# Patient Record
Sex: FEMALE | Race: WHITE | NOT HISPANIC OR LATINO | Employment: FULL TIME | URBAN - METROPOLITAN AREA
[De-identification: names, ages, dates, MRNs, and addresses within clinical notes are randomized per-mention and may not be internally consistent; named-entity substitution may affect disease eponyms.]

---

## 2017-01-03 ENCOUNTER — PATIENT MESSAGE (OUTPATIENT)
Dept: OBSTETRICS AND GYNECOLOGY | Facility: CLINIC | Age: 37
End: 2017-01-03

## 2017-01-09 ENCOUNTER — PATIENT MESSAGE (OUTPATIENT)
Dept: OBSTETRICS AND GYNECOLOGY | Facility: CLINIC | Age: 37
End: 2017-01-09

## 2017-01-12 ENCOUNTER — TELEPHONE (OUTPATIENT)
Dept: OBSTETRICS AND GYNECOLOGY | Facility: CLINIC | Age: 37
End: 2017-01-12

## 2017-01-12 NOTE — TELEPHONE ENCOUNTER
----- Message from Terry Ferro sent at 1/12/2017 12:56 PM CST -----  Contact: pt  x_ 1st Request   _ 2nd Request   _ 3rd Request     Who: GALILEA LAW [[id number]]    Why: pt is requesting a call back in reference to medical records    What Number to Call Back: [phone]    When to Expect a call back: (Before the end of the day)   -- if call after 3:00 call back will be tomorrow.

## 2017-01-18 ENCOUNTER — PATIENT MESSAGE (OUTPATIENT)
Dept: OBSTETRICS AND GYNECOLOGY | Facility: CLINIC | Age: 37
End: 2017-01-18

## 2017-02-02 ENCOUNTER — OFFICE VISIT (OUTPATIENT)
Dept: OPTOMETRY | Facility: CLINIC | Age: 37
End: 2017-02-02
Payer: COMMERCIAL

## 2017-02-02 DIAGNOSIS — H52.13 MYOPIA, BILATERAL: Primary | ICD-10-CM

## 2017-02-02 PROCEDURE — 99999 PR PBB SHADOW E&M-EST. PATIENT-LVL II: CPT | Mod: PBBFAC,,, | Performed by: OPTOMETRIST

## 2017-02-02 PROCEDURE — 92015 DETERMINE REFRACTIVE STATE: CPT | Mod: S$GLB,,, | Performed by: OPTOMETRIST

## 2017-02-02 PROCEDURE — 92014 COMPRE OPH EXAM EST PT 1/>: CPT | Mod: S$GLB,,, | Performed by: OPTOMETRIST

## 2017-02-02 RX ORDER — CABERGOLINE 0.5 MG/1
0.25 TABLET ORAL
COMMUNITY
End: 2017-07-25

## 2017-02-02 RX ORDER — LEVOTHYROXINE SODIUM 75 UG/1
75 TABLET ORAL DAILY
COMMUNITY
End: 2017-09-19 | Stop reason: SDUPTHER

## 2017-02-02 NOTE — PROGRESS NOTES
HPI     DLS: 09/24/2015 Dr. Hall    Patient states she is here for an eye exam. She is seeing well out of her   glasses. She says she needs a new script because lenses are scratched.   Denies flashes, floaters, diplopia, photophobia, glare, HA's, or pain.     No eye meds at this time        Last edited by Yessi Portillo on 2/2/2017 10:28 AM.     Vision Exam    Assessment /Plan     For exam results, see Encounter Report.    Myopia, bilateral  Eyeglass Final Rx     Eyeglass Final Rx      Sphere Cylinder   Right -3.00 Sphere   Left -3.00 Sphere       Type:  SVL    Expiration Date:  2/3/2018                  RTC 1 yr, MEME

## 2017-06-27 ENCOUNTER — LAB VISIT (OUTPATIENT)
Dept: LAB | Facility: HOSPITAL | Age: 37
End: 2017-06-27
Attending: OBSTETRICS & GYNECOLOGY
Payer: COMMERCIAL

## 2017-06-27 ENCOUNTER — OFFICE VISIT (OUTPATIENT)
Dept: OBSTETRICS AND GYNECOLOGY | Facility: CLINIC | Age: 37
End: 2017-06-27
Payer: COMMERCIAL

## 2017-06-27 VITALS
HEIGHT: 62 IN | BODY MASS INDEX: 23.13 KG/M2 | SYSTOLIC BLOOD PRESSURE: 114 MMHG | DIASTOLIC BLOOD PRESSURE: 68 MMHG | WEIGHT: 125.69 LBS

## 2017-06-27 DIAGNOSIS — E55.9 VITAMIN D DEFICIENCY: ICD-10-CM

## 2017-06-27 DIAGNOSIS — Z34.01 PREGNANCY, FIRST, OBSTETRICAL CARE, FIRST TRIMESTER: ICD-10-CM

## 2017-06-27 DIAGNOSIS — Z01.419 VISIT FOR GYNECOLOGIC EXAMINATION: Primary | ICD-10-CM

## 2017-06-27 DIAGNOSIS — E03.9 HYPOTHYROIDISM AFFECTING PREGNANCY: ICD-10-CM

## 2017-06-27 DIAGNOSIS — O99.280 HYPOTHYROIDISM AFFECTING PREGNANCY: ICD-10-CM

## 2017-06-27 LAB
25(OH)D3+25(OH)D2 SERPL-MCNC: 28 NG/ML
ABO + RH BLD: NORMAL
BASOPHILS # BLD AUTO: 0.03 K/UL
BASOPHILS NFR BLD: 0.4 %
BLD GP AB SCN CELLS X3 SERPL QL: NORMAL
DIFFERENTIAL METHOD: ABNORMAL
EOSINOPHIL # BLD AUTO: 0.2 K/UL
EOSINOPHIL NFR BLD: 2.2 %
ERYTHROCYTE [DISTWIDTH] IN BLOOD BY AUTOMATED COUNT: 14.2 %
HCT VFR BLD AUTO: 35.7 %
HGB BLD-MCNC: 12.3 G/DL
LYMPHOCYTES # BLD AUTO: 2.3 K/UL
LYMPHOCYTES NFR BLD: 30.7 %
MCH RBC QN AUTO: 31.5 PG
MCHC RBC AUTO-ENTMCNC: 34.5 %
MCV RBC AUTO: 91 FL
MONOCYTES # BLD AUTO: 0.7 K/UL
MONOCYTES NFR BLD: 8.8 %
NEUTROPHILS # BLD AUTO: 4.4 K/UL
NEUTROPHILS NFR BLD: 57.6 %
PLATELET # BLD AUTO: 275 K/UL
PMV BLD AUTO: 10.2 FL
RBC # BLD AUTO: 3.91 M/UL
TSH SERPL DL<=0.005 MIU/L-ACNC: 1.06 UIU/ML
WBC # BLD AUTO: 7.59 K/UL

## 2017-06-27 PROCEDURE — 82306 VITAMIN D 25 HYDROXY: CPT

## 2017-06-27 PROCEDURE — 86901 BLOOD TYPING SEROLOGIC RH(D): CPT

## 2017-06-27 PROCEDURE — 99999 PR PBB SHADOW E&M-EST. PATIENT-LVL III: CPT | Mod: PBBFAC,,, | Performed by: OBSTETRICS & GYNECOLOGY

## 2017-06-27 PROCEDURE — 84443 ASSAY THYROID STIM HORMONE: CPT

## 2017-06-27 PROCEDURE — 85025 COMPLETE CBC W/AUTO DIFF WBC: CPT

## 2017-06-27 PROCEDURE — 87086 URINE CULTURE/COLONY COUNT: CPT

## 2017-06-27 PROCEDURE — 87340 HEPATITIS B SURFACE AG IA: CPT

## 2017-06-27 PROCEDURE — 86762 RUBELLA ANTIBODY: CPT

## 2017-06-27 PROCEDURE — 86703 HIV-1/HIV-2 1 RESULT ANTBDY: CPT

## 2017-06-27 PROCEDURE — 86592 SYPHILIS TEST NON-TREP QUAL: CPT

## 2017-06-27 PROCEDURE — 99395 PREV VISIT EST AGE 18-39: CPT | Mod: S$GLB,,, | Performed by: OBSTETRICS & GYNECOLOGY

## 2017-06-27 PROCEDURE — 87591 N.GONORRHOEAE DNA AMP PROB: CPT

## 2017-06-27 PROCEDURE — 86900 BLOOD TYPING SEROLOGIC ABO: CPT

## 2017-06-27 NOTE — LETTER
June 27, 2017    Evelia Mishra  [address] [place] [address]         [facility] 5th Floor  [address]  [facility] [address]  Phone: [phone] June 27, 2017     Patient: Evelia Mishra   YOB: 1980   Date of Visit: 6/27/2017       To Whom It May Concern:    It is my medical opinion that Evelia Mishra should remain out of work until throughout the remainder of the pregnancy due to pregnancy related symptoms and prior poor obstetric history.    If you have any questions or concerns, please don't hesitate to call.    Sincerely,        Мария Adams MD

## 2017-06-27 NOTE — PROGRESS NOTES
HISTORY OF PRESENT ILLNESS:    Evelia Mishra is a 36 y.o. female, , Patient's last menstrual period was 04/15/2017.,  presents for a routine exam and early PREGNANCY.  PER LMP AND VERIFIED BY USG WITH DR TREVINO, 10 WEEKS IUP (CONFIRMED FCA WITH USG) AND EDC 2018  HAS A COMPLICATED HISTORY OF TERM , THEN ECTOPIC - MTX AND IVF TWINS WITH T2 DEMISE AND TTOP AT 17 WEEKS.  RETAINED POC TREATED WITH D&C, THEN PATIENT TREATED IN [place] WITH SEPSIS, RETAINED POC - UNDERWENT ADDITIONAL D&C.  URIEL SAW PATIENT FOLLOWING ALL OF THIS LAST November AND HYSTEROSCOPIC TREATMENT OF ASHERMANS SYNDROME.  THIS IS SPON PREGNANCY, BUT IUP VERIFIED BY URIEL EARLIER.  HAS HAD SOME NAUSEA AND VOMITING, PINK AND BROWN T1 SPOTTING, AND IS ++ ANXIOUS WITH PRIOR POOR HX.  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  AMA - INTERESTED IN cffDNA  MFM CONSULTATION RE MONITORING PLAN THROUGH PREGNANCY  TVS CONFIRMS FCA AND APPROPRIATE SIZE    History reviewed. No pertinent past medical history.    History reviewed. No pertinent surgical history.    MEDICATIONS AND ALLERGIES:      Current Outpatient Prescriptions:     cabergoline (DOSTINEX) 0.5 mg tablet, Take 0.25 mg by mouth twice a week., Disp: , Rfl:     cholecalciferol, vitamin D3, (VITAMIN D3) 2,000 unit Cap, Take 1 capsule by mouth once daily., Disp: , Rfl:     levothyroxine (SYNTHROID) 75 MCG tablet, Take 75 mcg by mouth once daily., Disp: , Rfl:     PRENATAL VIT #76/IRON,CARB/FA (PNV 29-1 ORAL), Take 1 tablet by mouth once daily., Disp: , Rfl:     Review of patient's allergies indicates:  No Known Allergies    Family History   Problem Relation Age of Onset    Hyperlipidemia Father     Diabetes Maternal Grandmother     Cataracts Maternal Grandmother     Diabetes Paternal Grandmother     Melanoma Neg Hx     Amblyopia Neg Hx     Blindness Neg Hx     Cancer  "Neg Hx     Hypertension Neg Hx     Macular degeneration Neg Hx     Retinal detachment Neg Hx     Strabismus Neg Hx     Stroke Neg Hx     Thyroid disease Neg Hx     Glaucoma Neg Hx        Social History     Social History    Marital status:      Spouse name: N/A    Number of children: N/A    Years of education: N/A     Occupational History    physician [facility]     Social History Main Topics    Smoking status: Never Smoker    Smokeless tobacco: Not on file    Alcohol use No    Drug use: No    Sexual activity: Not Currently     Partners: Male     Birth control/ protection: None     Other Topics Concern    Are You Pregnant Or Think You May Be? No    Breast-Feeding No     Social History Narrative    No narrative on file       COMPREHENSIVE GYN HISTORY:  PAP History: Denies abnormal Paps.  Infection History: Denies STDs. Denies PID.  Benign History: Denies uterine fibroids. Denies ovarian cysts. Denies endometriosis. Denies other conditions.  Cancer History: Denies cervical cancer. Denies uterine cancer or hyperplasia. Denies ovarian cancer. Denies vulvar cancer or pre-cancer. Denies vaginal cancer or pre-cancer. Denies breast cancer. Denies colon cancer.  Sexual Activity History: Reports currently being sexually active  Menstrual History: Monthly, mild-moderate.      ROS:  GENERAL: No weight changes. No swelling. No fatigue. No fever.  CARDIOVASCULAR: No chest pain. No shortness of breath. No leg cramps.   NEUROLOGICAL: No headaches. No vision changes.  BREASTS: No pain. No lumps. No discharge.  ABDOMEN: No pain. No nausea. No vomiting. No diarrhea. No constipation.  REPRODUCTIVE: No abnormal bleeding.   VULVA: No pain. No lesions. No itching.  VAGINA: No relaxation. No itching. No odor. No discharge. No lesions.  URINARY: No incontinence. No nocturia. No frequency. No dysuria.    /68   Ht 5' 2" (1.575 m)   Wt 57 kg (125 lb 10.6 oz)   LMP 04/15/2017   BMI 22.98 " kg/m²     PE:  APPEARANCE: Well nourished, well developed, in no acute distress.  AFFECT: WNL, alert and oriented x 3.  SKIN: No acne or hirsutism.  NECK: Neck symmetric, without masses or thyromegaly.  NODES: No inguinal, cervical, axillary or femoral lymph node enlargement.  CHEST: Good respiratory effort.   ABDOMEN: Soft. No tenderness or masses. No hepatosplenomegaly. No hernias.  BREASTS: Symmetrical, no skin changes, visible lesions, palpable masses or nipple discharge bilaterally.  PELVIC: External female genitalia without lesions.  Female hair distribution. Adequate perineal body, Normal urethral meatus. Vagina moist and well rugated without lesions or discharge.  No significant cystocele or rectocele present. Cervix pink without lesions, discharge or tenderness. Uterus is 10 WEEKS size, regular, mobile and nontender. Adnexa without masses or tenderness.  EXTREMITIES: No edema    PROCEDURES:      DIAGNOSIS:  1. Visit for gynecologic examination     2. Pregnancy, first, obstetrical care, first trimester  CBC auto differential    Type & Screen - Ob Profile    Rubella antibody, IgG    HIV-1 and HIV-2 antibodies    RPR    Hepatitis B surface antigen    Urine culture    C. trachomatis/N. gonorrhoeae by AMP DNA Cervicovaginal    Ambulatory consult to Maternal Fetal Medicine    US MFM Procedure ([facility])   3. Hypothyroidism affecting pregnancy  TSH   4. Vitamin D deficiency  VITAMIN D       LABS AND TESTS ORDERED:    MEDICATIONS PRESCRIBED:    COUNSELING:   The patient was counseled today on ACS PAP guidelines, with recommendations for yearly pelvic exams unless their uterus, cervix, and ovaries were removed for benign reasons; in that case, examinations every 3-5 years are recommended.  The patient was also counseled regarding monthly breast self-examination, routine STD screening for at-risk populations, prophylactic immunizations for transmitted infections such as  HPV, Pertussis, or Influenza as appropriate,  and yearly mammograms when indicated by ACS guidelines.  She was advised to see her primary care physician for all other health maintenance.    FOLLOW-UP with me annually.

## 2017-06-28 LAB
BACTERIA UR CULT: NORMAL
C TRACH DNA SPEC QL NAA+PROBE: NOT DETECTED
HBV SURFACE AG SERPL QL IA: NEGATIVE
HIV 1+2 AB+HIV1 P24 AG SERPL QL IA: NEGATIVE
N GONORRHOEA DNA SPEC QL NAA+PROBE: NOT DETECTED
RPR SER QL: NORMAL
RUBV IGG SER-ACNC: 47.4 IU/ML
RUBV IGG SER-IMP: REACTIVE

## 2017-06-30 ENCOUNTER — PATIENT MESSAGE (OUTPATIENT)
Dept: OBSTETRICS AND GYNECOLOGY | Facility: CLINIC | Age: 37
End: 2017-06-30

## 2017-07-02 ENCOUNTER — PATIENT MESSAGE (OUTPATIENT)
Dept: OBSTETRICS AND GYNECOLOGY | Facility: HOSPITAL | Age: 37
End: 2017-07-02

## 2017-07-11 ENCOUNTER — PATIENT MESSAGE (OUTPATIENT)
Dept: OBSTETRICS AND GYNECOLOGY | Facility: CLINIC | Age: 37
End: 2017-07-11

## 2017-07-13 ENCOUNTER — TELEPHONE (OUTPATIENT)
Dept: OBSTETRICS AND GYNECOLOGY | Facility: CLINIC | Age: 37
End: 2017-07-13

## 2017-07-13 NOTE — TELEPHONE ENCOUNTER
----- Message from Bhargavi Ny sent at 7/13/2017  4:18 PM CDT -----  Contact: self  Pt needing a call back, she have questions and can be reached at [phone].

## 2017-07-14 ENCOUNTER — OFFICE VISIT (OUTPATIENT)
Dept: MATERNAL FETAL MEDICINE | Facility: CLINIC | Age: 37
End: 2017-07-14
Attending: OBSTETRICS & GYNECOLOGY
Payer: COMMERCIAL

## 2017-07-14 VITALS
BODY MASS INDEX: 23.06 KG/M2 | SYSTOLIC BLOOD PRESSURE: 114 MMHG | WEIGHT: 126.13 LBS | DIASTOLIC BLOOD PRESSURE: 78 MMHG

## 2017-07-14 DIAGNOSIS — Z34.01 PREGNANCY, FIRST, OBSTETRICAL CARE, FIRST TRIMESTER: ICD-10-CM

## 2017-07-14 DIAGNOSIS — O09.291 PRIOR POOR OBSTETRICAL HISTORY, ANTEPARTUM, FIRST TRIMESTER: ICD-10-CM

## 2017-07-14 DIAGNOSIS — E03.9 HYPOTHYROIDISM AFFECTING PREGNANCY IN FIRST TRIMESTER: ICD-10-CM

## 2017-07-14 DIAGNOSIS — O09.521 AMA (ADVANCED MATERNAL AGE) MULTIGRAVIDA 35+, FIRST TRIMESTER: ICD-10-CM

## 2017-07-14 DIAGNOSIS — O99.281 HYPOTHYROIDISM AFFECTING PREGNANCY IN FIRST TRIMESTER: ICD-10-CM

## 2017-07-14 DIAGNOSIS — O09.291 PRIOR POOR OBSTETRICAL HISTORY IN FIRST TRIMESTER, ANTEPARTUM: ICD-10-CM

## 2017-07-14 DIAGNOSIS — E03.9 HYPOTHYROIDISM IN PREGNANCY, ANTEPARTUM, FIRST TRIMESTER: ICD-10-CM

## 2017-07-14 DIAGNOSIS — O26.879 SHORT CERVICAL LENGTH DURING PREGNANCY, UNSPECIFIED TRIMESTER: Primary | ICD-10-CM

## 2017-07-14 DIAGNOSIS — O99.281 HYPOTHYROIDISM IN PREGNANCY, ANTEPARTUM, FIRST TRIMESTER: ICD-10-CM

## 2017-07-14 PROCEDURE — 76813 OB US NUCHAL MEAS 1 GEST: CPT | Mod: S$GLB,,, | Performed by: OBSTETRICS & GYNECOLOGY

## 2017-07-14 PROCEDURE — 99204 OFFICE O/P NEW MOD 45 MIN: CPT | Mod: 25,S$GLB,, | Performed by: OBSTETRICS & GYNECOLOGY

## 2017-07-14 PROCEDURE — 99999 PR PBB SHADOW E&M-EST. PATIENT-LVL II: CPT | Mod: PBBFAC,,, | Performed by: OBSTETRICS & GYNECOLOGY

## 2017-07-14 NOTE — LETTER
July 18, 2017      Мария Adams MD  [address]  [address]  [facility] [address]           [facility]  [address]  [facility] [address]  Phone: [phone]          Patient: Evelia Mishra   MR Number: [id number]   YOB: 1980   Date of Visit: 7/14/2017       Dear Dr. Мария Adams:    Thank you for referring Evelia Mishra to me for evaluation. Attached you will find relevant portions of my assessment and plan of care.    If you have questions, please do not hesitate to call me. I look forward to following Evelia Mishra along with you.    Sincerely,    Lyly Whitaker  CC:  No Recipients    If you would like to receive this communication electronically, please contact [email or web address] or [phone] to request more information on [employer] Link access.    For providers and/or their staff who would like to refer a patient to [facility], please contact us through our one-stop-shop provider referral line, [facility], at [phone].    If you feel you have received this communication in error or would no longer like to receive these types of communications, please e-mail [email or web address]

## 2017-07-19 ENCOUNTER — PATIENT MESSAGE (OUTPATIENT)
Dept: MATERNAL FETAL MEDICINE | Facility: CLINIC | Age: 37
End: 2017-07-19

## 2017-07-19 PROBLEM — O99.281 HYPOTHYROIDISM AFFECTING PREGNANCY IN FIRST TRIMESTER: Status: ACTIVE | Noted: 2017-07-19

## 2017-07-19 PROBLEM — E03.9 HYPOTHYROIDISM AFFECTING PREGNANCY IN FIRST TRIMESTER: Status: ACTIVE | Noted: 2017-07-19

## 2017-07-19 PROBLEM — O09.291 PRIOR POOR OBSTETRICAL HISTORY IN FIRST TRIMESTER, ANTEPARTUM: Status: ACTIVE | Noted: 2017-07-19

## 2017-07-20 NOTE — PROGRESS NOTES
Indication  ========    consult: NT/ AMA/ dating (Dr. Adams).    History  ======    General History  Other: AMA  twin loss at 17 weeks  hypothyroid  Previous Outcomes   4  Para 1  Ybarra children born living (T) 1  Ybarra children born (T) 1  Abortions (A) 2  Ybarra living children (L) 1  Miscarriages 1  Ectopic 1    Maternal Assessment  =================    Weight 57 kg  Weight (lb) 126 lb  BP syst 114 mmHg  BP diast 78 mmHg    Method  ======    Transabdominal ultrasound examination. View: Sufficient.    Pregnancy  =========    Ybarra pregnancy. Number of fetuses: 1.    Dating  ======    LMP on: 4/15/2017  GA by LMP 12 w + 6 d  SHAYLEE by LMP: 2018  Ultrasound examination on: 2017  GA by U/S based upon: AC, BPD, Femur, HC  GA by U/S 13 w + 6 d  SHAYLEE by U/S: 2018  Assigned: Dating performed on 2017, based on the LMP  Assigned GA 12 w + 6 d  Assigned SHAYLEE: 2018    General Evaluation  ==============    Cardiac activity: present.  bpm.  Fetal movements: visualized.  Umbilical cord: 3 vessel cord.  Amniotic fluid: normal amount.    Fetal Biometry  ============    Fetal Biometry  BPD 25.2 mm 14w 2d Hadlock  OFD 31.4 mm 14w 1d Brianna  HC 91.4 mm 14w 1d Hadlock  AC 72.4 mm 13w 5d Hadlock  Femur 11.7 mm 13w 3d Hadlock  NT 1.9 mm  EFW 81 g  Calculated by: Hadlock (BPD-HC-AC-FL)  EFW (lb) 0 lb  EFW (oz) 3 oz  Cephalic index 0.80  HC / AC 1.26  FL / BPD 0.46  FL / AC 0.16   bpm    Fetal Anatomy  ============    Cranium: normal  Stomach: normal  Bladder: normal    Maternal Structures  ===============    Uterus / Cervix  Uterus: Normal  Ovaries / Tubes / Adnexa  Rt ovary: Visualized  Rt ovary D1 1.4 cm  Rt ovary D2 1.1 cm  Rt ovary D3 1.7 cm  Rt ovary mean 1.4 cm  Rt ovary vol 1.4 cm³  Lt ovary: Visualized  Lt ovary D1 2.8 cm  Lt ovary D2 2.3 cm  Lt ovary D3 1.9 cm  Lt ovary mean 2.3 cm  Lt ovary vol 6.3 cm³  Lt ovarian corpus luteum D1 17.0 mm  Lt ovarian corpus luteum  D2 15.0 mm  Lt ovarian corpus luteum D3 19.0 mm  Pouch of Wesley / Other Structures  Cul de Sac: Normal    Consultation  ==========    referral for AMA, dating    referring MD: Dr. Adams    36 y.o.  at 12w 6d gestation per LMP    AMA    ob hx:  1. , 40 wks, female, 7 lbs 12oz,   2.  ectopic  3. , 17w 3d twin,   -bleeding during pregnancy, fetal demise of Twin A and ? PROM, subsequent fetal  demise of Twin b. Induction with cytotec, then D & C x 2 for retained products of conception  blood transfusion    hypothyroidism:  synthroid 75 micrograms daily    Ultrasound performed    Counseling: AMA  Today I counseled the patient on the relationship between maternal age and genetic aneuploidy. We discussed the risks and benefits of  screening tests versus definitive genetic testing (CVS and amniocentesis). She was counseled about her specific age related risk of Down  Syndrome. We discussed the limitations of ultrasound in the definitive diagnosis of Down Syndrome and we discussed CVS and  amniocentesis as providing definitive diagnosis. I quoted the patient a 1 in 500 procedure related risk of fetal loss with chorionic villus sampling  and a 1 in 900 procedure related risk of fetal loss with genetic amniocentesis. I also discussed with the patient the option of non-invasive  prenatal testing (NIPT) (ex. Materni T21) including the sensitivity and specificity of the test. She ws counseled on NT screen including  sensitivity and specificity. Patient's questions were answered. Patient declined pursuit of any genetic screening or invasive diagnostic  procedure.    Counseling: prior Twin 17 wk loss  Patient counseled on recommendation for cervical length assessment starting at 16 wks gestation and then every 2-3 wks until 24 wks  gestation . Patient counseled that risk factor for her prior loss with bleeding since 13 wks and twin gestation.    Counseling: hypothyroidism  In patients with  underlying hypothyoidism, ACOG currently recommends following patients with TSH levels every four weeks until replacement  therapy corrects the TSH into a normal range. Because of the unclear association of maternal subclinical hypothyroidism with decreased  intelligence testing in their offspring, some experts recommend keeping the TSH level in the lower third of normal. Once TSH level is in an  adequate range, rechecking the TSH level every trimester is indicated.    Patient reports that she had an hcg and ultrasound with reproductive endocrinologist Dr. Brenner that at the time suggested she was about 5  wks. She reports intercourse with her  was around April 22, 23-28. Patient reports that today's fetal biometry is 7 days off from her  LMP which is the same discrepancy when she had evaluation by Dr. Brenner. I recommend obtaining medical records for her dating from Dr. Brenner to assist in determining SHAYLEE.    I spent about 45 minutes in consultation separate from ultrasound      .    Impression  =========    1. 12w 6d zaman intrauterine pregnancy per LMP but fetal biometry c/w 13w 6d gestation  (discrepancy is 7 days which per ACOG guidelines you would not change her SHAYLEE/LMP but  patient feels that her hcg and ultrasound with Dr. Brenner would suggest more likely 13w 6d  2. FHR documented  3. NT measurement obtained .    Recommendation  ==============    1. MFM will schedule patient for TVU of cervix for baseline and then every 2-3 wks up to 23-24 wks gestation  secondary to prior 17 wk twin loss    2. At this time SHAYLEE is per LMP which is c/w fetal biometry (7 day discrepancy not greater than 7 days BUT  patient believes the SHAYLEE should be per fetal biometry  -- recommend obtain medical records from Dr. Brenner to assist in dating : see above for details of reason    3. MFM will schedule fetal anatomical survey 19-20 wks    4. hypothyroidism on synthroid: Endocrinologist managing    5. AMA:  -recommend  ultrasound at 32 wks gestation for fetal growth, MVP, and overall fetal well being    further recommendations can be provided with advancing EGA and per clinical status    .

## 2017-07-24 ENCOUNTER — PATIENT MESSAGE (OUTPATIENT)
Dept: OBSTETRICS AND GYNECOLOGY | Facility: CLINIC | Age: 37
End: 2017-07-24

## 2017-07-25 ENCOUNTER — INITIAL PRENATAL (OUTPATIENT)
Dept: OBSTETRICS AND GYNECOLOGY | Facility: CLINIC | Age: 37
End: 2017-07-25
Payer: COMMERCIAL

## 2017-07-25 VITALS — BODY MASS INDEX: 22.86 KG/M2 | SYSTOLIC BLOOD PRESSURE: 100 MMHG | DIASTOLIC BLOOD PRESSURE: 70 MMHG | WEIGHT: 125 LBS

## 2017-07-25 DIAGNOSIS — O99.282: ICD-10-CM

## 2017-07-25 DIAGNOSIS — O09.522 AMA (ADVANCED MATERNAL AGE) MULTIGRAVIDA 35+, SECOND TRIMESTER: ICD-10-CM

## 2017-07-25 DIAGNOSIS — Z3A.14 14 WEEKS GESTATION OF PREGNANCY: ICD-10-CM

## 2017-07-25 DIAGNOSIS — O09.92 SUPERVISION OF HIGH RISK PREGNANCY, ANTEPARTUM, SECOND TRIMESTER: Primary | ICD-10-CM

## 2017-07-25 DIAGNOSIS — O09.291 PRIOR POOR OBSTETRICAL HISTORY IN FIRST TRIMESTER, ANTEPARTUM: ICD-10-CM

## 2017-07-25 PROBLEM — E03.9 HYPOTHYROIDISM AFFECTING PREGNANCY: Status: ACTIVE | Noted: 2017-07-25

## 2017-07-25 PROBLEM — O99.280 HYPOTHYROIDISM AFFECTING PREGNANCY: Status: ACTIVE | Noted: 2017-07-25

## 2017-07-25 PROCEDURE — 99999 PR PBB SHADOW E&M-EST. PATIENT-LVL II: CPT | Mod: PBBFAC,,, | Performed by: NURSE PRACTITIONER

## 2017-07-25 PROCEDURE — 0502F SUBSEQUENT PRENATAL CARE: CPT | Mod: S$GLB,,, | Performed by: NURSE PRACTITIONER

## 2017-07-25 NOTE — PROGRESS NOTES
Denies cramping, bleeding and nausea / heartburn has resolved; Occ has LBP and discussed comfort measures, diet, wt gain, second trimester; Declined genetic testing; Seeing MFM every 2 weeks for cervical checks; F/U visit in 4 weeks.

## 2017-08-07 ENCOUNTER — OFFICE VISIT (OUTPATIENT)
Dept: MATERNAL FETAL MEDICINE | Facility: CLINIC | Age: 37
End: 2017-08-07
Attending: OBSTETRICS & GYNECOLOGY
Payer: COMMERCIAL

## 2017-08-07 VITALS
SYSTOLIC BLOOD PRESSURE: 108 MMHG | WEIGHT: 130.75 LBS | DIASTOLIC BLOOD PRESSURE: 64 MMHG | BODY MASS INDEX: 23.91 KG/M2

## 2017-08-07 DIAGNOSIS — O09.292 PRIOR POOR OBSTETRICAL HISTORY IN SECOND TRIMESTER, ANTEPARTUM: ICD-10-CM

## 2017-08-07 DIAGNOSIS — O09.522 AMA (ADVANCED MATERNAL AGE) MULTIGRAVIDA 35+, SECOND TRIMESTER: ICD-10-CM

## 2017-08-07 DIAGNOSIS — O26.879 SHORT CERVICAL LENGTH DURING PREGNANCY, UNSPECIFIED TRIMESTER: ICD-10-CM

## 2017-08-07 PROCEDURE — 99999 PR PBB SHADOW E&M-EST. PATIENT-LVL II: CPT | Mod: PBBFAC,,, | Performed by: OBSTETRICS & GYNECOLOGY

## 2017-08-07 PROCEDURE — 76817 TRANSVAGINAL US OBSTETRIC: CPT | Mod: S$GLB,,, | Performed by: OBSTETRICS & GYNECOLOGY

## 2017-08-07 PROCEDURE — 99499 UNLISTED E&M SERVICE: CPT | Mod: S$GLB,,, | Performed by: OBSTETRICS & GYNECOLOGY

## 2017-08-07 NOTE — LETTER
August 7, 2017      Мария Adams MD  [address]  [address]  [facility] [address]           [facility]  [address]  [facility] [address]  Phone: [phone]          Patient: Evelia Mishra   MR Number: [id number]   YOB: 1980   Date of Visit: 8/7/2017       Dear Dr. Мария Adams:    Thank you for referring Evelia Mishra to me for evaluation. Attached you will find relevant portions of my assessment and plan of care.    If you have questions, please do not hesitate to call me. I look forward to following Evelia Mishra along with you.    Sincerely,    Yadira Smith MD    Enclosure  CC:  No Recipients    If you would like to receive this communication electronically, please contact [email or web address] or [phone] to request more information on [facility] access.    For providers and/or their staff who would like to refer a patient to [facility], please contact us through our one-stop-shop provider referral line, [facility], at [phone].    If you feel you have received this communication in error or would no longer like to receive these types of communications, please e-mail [email or web address]

## 2017-08-07 NOTE — PROGRESS NOTES
Indication  ========    cervical assessment via TVU (Dr. Adams).    History  ======    General History  Other: AMA: declined genetic screening or invasive procedure  twin loss at 17 weeks, bleeding during pregnancy  hypothyroid on synthroid  Previous Outcomes   4  Para 1  Ybarra children born living (T) 1  Ybarra children born (T) 1  Abortions (A) 2  Ybarra living children (L) 1  Miscarriages 1  Ectopic 1    Maternal Assessment  =================    Weight 59 kg  Weight (lb) 130 lb  BP syst 108 mmHg  BP diast 64 mmHg    Method  ======    Transabdominal and transvaginal ultrasound examination. View: Sufficient.    Pregnancy  =========    Ybarra pregnancy. Number of fetuses: 1.    Dating  ======    LMP on: 4/15/2017  GA by LMP 16 w + 2 d  SHAYLEE by LMP: 2018  Ultrasound examination on: 2017  GA by U/S based upon: AC, BPD, Femur, HC  GA by U/S 17 w + 4 d  SHAYLEE by U/S: 2018  Assigned: Dating performed on 2017, based on the LMP  Assigned GA 16 w + 2 d  Assigned SHAYLEE: 2018    General Evaluation  ==============    Cardiac activity: present.  bpm.  Fetal movements: visualized.  Presentation: breech.  Placenta: anterior.  Umbilical cord: 3 vessel cord.  Amniotic fluid: normal amount.    Fetal Biometry  ============    Fetal Biometry  BPD 38.6 mm 17w 5d Hadlock  .2 mm 17w 5d Hadlock  .3 mm 17w 5d Hadlock  Femur 24.3 mm 17w 2d Hadlock  Humerus 26.0 mm 18w 1d Brianna   g  Calculated by: Hadlock (BPD-HC-AC-FL)  EFW (lb) 0 lb  EFW (oz) 7 oz  HC / AC 1.21  FL / BPD 0.63  FL / AC 0.20   bpm    Fetal Anatomy  ===========    Cranium: normal  Stomach: normal  Bladder: normal    Maternal Structures  ===============    Uterus / Cervix  Cervical length 39.0 mm    Impression  =========    1. 16w 2d ybarra intrauterine pregnancy  2. FHR documented  3. Cervical length via TVU wnl:  length = 3.9cm  no funneling or dynamic changes visualized    Patient counseled on  today's ultrasound evaluation and recommendations. Her questions were answered.    Recommendation  ==============    1. We will schedule patient for a f/u transvaginal sono of cervix in 2 wks and in 4 wks  (at the 4wk sono we will also perform targeted fetal anatomical survey    2. Patient reports she will pursue obtaining a copy of ultrasound performed at Dr. Brenner office for confirmation of her dating.

## 2017-08-08 ENCOUNTER — PATIENT MESSAGE (OUTPATIENT)
Dept: MATERNAL FETAL MEDICINE | Facility: CLINIC | Age: 37
End: 2017-08-08

## 2017-08-13 ENCOUNTER — PATIENT MESSAGE (OUTPATIENT)
Dept: OBSTETRICS AND GYNECOLOGY | Facility: CLINIC | Age: 37
End: 2017-08-13

## 2017-08-21 ENCOUNTER — OFFICE VISIT (OUTPATIENT)
Dept: MATERNAL FETAL MEDICINE | Facility: CLINIC | Age: 37
End: 2017-08-21
Attending: OBSTETRICS & GYNECOLOGY
Payer: COMMERCIAL

## 2017-08-21 VITALS
BODY MASS INDEX: 24.48 KG/M2 | SYSTOLIC BLOOD PRESSURE: 100 MMHG | WEIGHT: 133.81 LBS | DIASTOLIC BLOOD PRESSURE: 62 MMHG

## 2017-08-21 DIAGNOSIS — Z36.0 SCREENING FOR CHROMOSOMAL ANOMALIES BY AMNIOCENTESIS: ICD-10-CM

## 2017-08-21 DIAGNOSIS — O09.522 AMA (ADVANCED MATERNAL AGE) MULTIGRAVIDA 35+, SECOND TRIMESTER: ICD-10-CM

## 2017-08-21 DIAGNOSIS — O99.282: ICD-10-CM

## 2017-08-21 DIAGNOSIS — O26.879 SHORT CERVICAL LENGTH DURING PREGNANCY, UNSPECIFIED TRIMESTER: ICD-10-CM

## 2017-08-21 DIAGNOSIS — O09.291 PRIOR POOR OBSTETRICAL HISTORY IN FIRST TRIMESTER, ANTEPARTUM: ICD-10-CM

## 2017-08-21 PROCEDURE — 99999 PR PBB SHADOW E&M-EST. PATIENT-LVL II: CPT | Mod: PBBFAC,,, | Performed by: OBSTETRICS & GYNECOLOGY

## 2017-08-21 PROCEDURE — 76817 TRANSVAGINAL US OBSTETRIC: CPT | Mod: S$GLB,,, | Performed by: OBSTETRICS & GYNECOLOGY

## 2017-08-21 PROCEDURE — 76811 OB US DETAILED SNGL FETUS: CPT | Mod: S$GLB,,, | Performed by: OBSTETRICS & GYNECOLOGY

## 2017-08-21 PROCEDURE — 99499 UNLISTED E&M SERVICE: CPT | Mod: S$GLB,,, | Performed by: OBSTETRICS & GYNECOLOGY

## 2017-08-21 NOTE — LETTER
August 23, 2017      Мария Adams MD  [address]  [address]  [facility] [address]           [facility]  [address]  [facility] [address]  Phone: [phone]          Patient: Evelia Mishra   MR Number: [id number]   YOB: 1980   Date of Visit: 8/21/2017       Dear Dr. Мария Adams:    Thank you for referring Evelia Mishra to me for evaluation. Attached you will find relevant portions of my assessment and plan of care.    If you have questions, please do not hesitate to call me. I look forward to following Evelia Mishra along with you.    Sincerely,    Yadira Smith MD    Enclosure  CC:  No Recipients    If you would like to receive this communication electronically, please contact [email or web address] or [phone] to request more information on [employer] Link access.    For providers and/or their staff who would like to refer a patient to [facility], please contact us through our one-stop-shop provider referral line, [facility] [place], at [phone].    If you feel you have received this communication in error or would no longer like to receive these types of communications, please e-mail [email or web address]

## 2017-08-22 ENCOUNTER — ROUTINE PRENATAL (OUTPATIENT)
Dept: OBSTETRICS AND GYNECOLOGY | Facility: CLINIC | Age: 37
End: 2017-08-22
Payer: COMMERCIAL

## 2017-08-22 VITALS
SYSTOLIC BLOOD PRESSURE: 118 MMHG | DIASTOLIC BLOOD PRESSURE: 64 MMHG | WEIGHT: 134.25 LBS | BODY MASS INDEX: 24.56 KG/M2

## 2017-08-22 DIAGNOSIS — O09.92 SUPERVISION OF HIGH RISK PREGNANCY, ANTEPARTUM, SECOND TRIMESTER: ICD-10-CM

## 2017-08-22 DIAGNOSIS — O09.291 PRIOR POOR OBSTETRICAL HISTORY IN FIRST TRIMESTER, ANTEPARTUM: ICD-10-CM

## 2017-08-22 DIAGNOSIS — O99.282: ICD-10-CM

## 2017-08-22 DIAGNOSIS — Z3A.18 18 WEEKS GESTATION OF PREGNANCY: ICD-10-CM

## 2017-08-22 DIAGNOSIS — O09.522 AMA (ADVANCED MATERNAL AGE) MULTIGRAVIDA 35+, SECOND TRIMESTER: Primary | ICD-10-CM

## 2017-08-22 PROCEDURE — 99999 PR PBB SHADOW E&M-EST. PATIENT-LVL II: CPT | Mod: PBBFAC,,, | Performed by: NURSE PRACTITIONER

## 2017-08-22 PROCEDURE — 0502F SUBSEQUENT PRENATAL CARE: CPT | Mod: S$GLB,,, | Performed by: NURSE PRACTITIONER

## 2017-08-22 NOTE — PROGRESS NOTES
Denies cramping, bleeding; Reports mild back pain relieved with rest; Had MFM US yesterday with nl cervical length; Discussed diet, wt gain, second trimester; Declined genetic scfreening; Has MFM US in two weeks and visit in 4 weeks.

## 2017-08-26 NOTE — PROGRESS NOTES
Indication  ========    hx of 17 wk twin loss: fetal anatomical survey and cervix (Dr. Adams).    History  ======    General History  Other: AMA: declined genetic screening or invasive procedure  twin loss at 17 weeks, bleeding during pregnancy  hypothyroid on synthroid  Previous Outcomes   4  Para 1  Ybarra children born living (T) 1  Ybarra children born (T) 1  Abortions (A) 2  Ybarra living children (L) 1  Miscarriages 1  Ectopic 1    Maternal Assessment  =================    Weight 60 kg  Weight (lb) 132 lb  BP syst 100 mmHg  BP diast 62 mmHg    Method  ======    Transabdominal and transvaginal ultrasound examination. View: Suboptimal view: limited by fetal position.    Pregnancy  =========    Ybarra pregnancy. Number of fetuses: 1.    Dating  ======    LMP on: 4/15/2017  Cycle: regular cycle  GA by LMP 18 w + 2 d  SHAYLEE by LMP: 2018  Ultrasound examination on: 2017  GA by U/S based upon: AC, BPD, Femur, HC  GA by U/S 20 w + 0 d  SHAYLEE by U/S: 2018  Assigned: Dating performed on 2017, based on the LMP  Assigned GA 18 w + 2 d  Assigned SHAYLEE: 2018    General Evaluation  ==============    Cardiac activity: present.  bpm.  Fetal movements: visualized.  Presentation: breech.  Placenta: anterior.  Umbilical cord: 3 vessel cord, normal.  Amniotic fluid: normal amount.    Fetal Biometry  ============    Fetal Biometry  BPD 46.2 mm 20w 0d Hadlock  OFD 59.7 mm 20w 5d Brianna  .8 mm 19w 4d Hadlock  .1 mm 20w 2d Hadlock  Femur 31.6 mm 19w 6d Hadlock  Cerebellum tr 20.0 mm 19w 6d Dennis  CM 3.0 mm  Nuchal fold 4.56 mm  Humerus 30.7 mm 20w 1d Brianna   g  Calculated by: Hadlock (BPD-HC-AC-FL)  EFW (lb) 0 lb  EFW (oz) 12 oz  Cephalic index 0.77  HC / AC 1.12  FL / BPD 0.68  FL / AC 0.21   bpm  Head / Face / Neck   5.5 mm    Fetal Anatomy  ============    Cranium: normal  Lateral ventricles: normal  Choroid plexus: normal  Midline falx: normal  Cavum  septi pellucidi: normal  Cerebellum: normal  Cisterna magna: normal  Head shape: normal  Rt lateral ventricle: normal  Lt lateral ventricle: normal  Rt choroid plexus: normal  Lt choroid plexus: normal  Parenchyma: normal  Third ventricle: normal  Posterior fossa: normal  Cerebellar lobes: normal  Vermis: normal  Neck: appears normal  Nuchal fold: normal  Lips: normal  Profile: normal  Nose: normal  Maxilla: normal  Mandible: normal  RVOT: normal  LVOT: normal  Heart / Thorax: Septal views: normal  4-chamber view: 4-chamber suboptimal  Situs: normal  Aortic arch: suboptimal  Ductal arch: suboptimal  SVC: suboptimal  IVC: suboptimal  3-vessel view: normal  3-vessel-trachea view: suboptimal  Cardiac position: normal  Cardiac axis: normal  Cardiac size: normal  Cardiac rhythm: normal  Rt lung: normal  Lt lung: normal  Diaphragm: normal  Cord insertion: suboptimal  Stomach: normal  Kidneys: normal  Bladder: normal  Abdom. wall: suboptimal  Abdom. cavity: normal  Rt kidney: normal  Lt kidney: normal  Liver: normal  Bowel: normal  Cervical spine: suboptimal  Thoracic spine: suboptimal  Lumbar spine: suboptimal  Sacral spine: suboptimal  Rt arm: normal  Lt arm: normal  Rt hand: normal  Lt hand: normal  Rt leg: normal  Lt leg: normal  Rt foot: normal  Lt foot: normal  Position of hands: normal  Position of feet: normal    Maternal Structures  ===============    Uterus / Cervix  Cervix: Normal  Approach: Transvaginal  Cervical length 51.0 mm  Ovaries / Tubes / Adnexa  Lt ovary: Visualized  Lt ovarian cyst(s): Cysts identified  Findings: small simple cyst (?resolving corpus luteum)  D1 8.0 mm  D2 10.0 mm  Mean 9.0 mm  Other: no funneling or dynamic changes visualized    Impression  =========    1. 18w 2d zaman intrauterine pregnancy  2. normal interval fetal growth  3. No fetal structural abnormalities appreciated but incomplete fetal anatomical survey  4. Amniotic fluid volume wnl per qualitative assessment  5. Cervical  length assessment via TVU wnl    Patient counseled on sono evaluation and recommendations. Her questions were answered.    Recommendation  ==============    1. we will schedule patient for a f/u sono in 2 wks for cervical assessment    2. We will schedule patient for a f/u sono in 4 wks for cervix, f/u anatomy, and interval fetal growth .

## 2017-09-01 ENCOUNTER — OFFICE VISIT (OUTPATIENT)
Dept: MATERNAL FETAL MEDICINE | Facility: CLINIC | Age: 37
End: 2017-09-01
Attending: OBSTETRICS & GYNECOLOGY
Payer: COMMERCIAL

## 2017-09-01 ENCOUNTER — TELEPHONE (OUTPATIENT)
Dept: MATERNAL FETAL MEDICINE | Facility: CLINIC | Age: 37
End: 2017-09-01

## 2017-09-01 VITALS — SYSTOLIC BLOOD PRESSURE: 100 MMHG | WEIGHT: 135.56 LBS | DIASTOLIC BLOOD PRESSURE: 66 MMHG | BODY MASS INDEX: 24.8 KG/M2

## 2017-09-01 DIAGNOSIS — O09.522 AMA (ADVANCED MATERNAL AGE) MULTIGRAVIDA 35+, SECOND TRIMESTER: ICD-10-CM

## 2017-09-01 DIAGNOSIS — O09.292 PRIOR POOR OBSTETRICAL HISTORY IN SECOND TRIMESTER, ANTEPARTUM: ICD-10-CM

## 2017-09-01 DIAGNOSIS — O26.879 SHORT CERVICAL LENGTH DURING PREGNANCY, UNSPECIFIED TRIMESTER: ICD-10-CM

## 2017-09-01 DIAGNOSIS — O99.282: ICD-10-CM

## 2017-09-01 PROCEDURE — 99999 PR PBB SHADOW E&M-EST. PATIENT-LVL II: CPT | Mod: PBBFAC,,,

## 2017-09-01 PROCEDURE — 99499 UNLISTED E&M SERVICE: CPT | Mod: S$GLB,,, | Performed by: OBSTETRICS & GYNECOLOGY

## 2017-09-01 PROCEDURE — 76815 OB US LIMITED FETUS(S): CPT | Mod: S$GLB,,, | Performed by: OBSTETRICS & GYNECOLOGY

## 2017-09-01 PROCEDURE — 76817 TRANSVAGINAL US OBSTETRIC: CPT | Mod: S$GLB,,, | Performed by: OBSTETRICS & GYNECOLOGY

## 2017-09-01 NOTE — LETTER
September 2, 2017      Мария Adams MD  [address]  [address]  [facility] [address]           [facility]  [address]  [facility] [address]  Phone: [phone]          Patient: Evelia Mishra   MR Number: [id number]   YOB: 1980   Date of Visit: 9/1/2017       Dear Dr. Мария Adams:    Thank you for referring Evelia Mishra to me for evaluation. Attached you will find relevant portions of my assessment and plan of care.    If you have questions, please do not hesitate to call me. I look forward to following Evelia Mishra along with you.    Sincerely,    Yadira Smith MD    Enclosure  CC:  No Recipients    If you would like to receive this communication electronically, please contact [email or web address] or [phone] to request more information on [employer] Link access.    For providers and/or their staff who would like to refer a patient to [facility], please contact us through our one-stop-shop provider referral line, [facility], at [phone].    If you feel you have received this communication in error or would no longer like to receive these types of communications, please e-mail [email or web address]

## 2017-09-01 NOTE — TELEPHONE ENCOUNTER
I was not able to see patient in [facility] clinic when she came for her f/u sono of the cervix since I was rounding on patients in the hospital. I spoke to patient about her ultrasound evaluation via telephone and her questions were answered. She is scheduled for a f/u in 2 wk for ultrasound and f/u consult.

## 2017-09-03 NOTE — PROGRESS NOTES
Indication  ========    r/t office visit: cervix (Dr. Adams).    History  ======    General History  Other: AMA: declined genetic screening or invasive procedure  twin loss at 17 weeks, bleeding during pregnancy  hypothyroid on synthroid  Previous Outcomes   4  Para 1  Ybarra children born living (T) 1  Ybarra children born (T) 1  Abortions (A) 2  Ybarra living children (L) 1  Miscarriages 1  Ectopic 1    Maternal Assessment  =================    Weight 62 kg  Weight (lb) 137 lb  BP syst 100 mmHg  BP diast 66 mmHg    Method  ======    Transabdominal and transvaginal ultrasound examination. View: Sufficient.    Pregnancy  =========    Ybarra pregnancy. Number of fetuses: 1.    Dating  ======    LMP on: 4/15/2017  Cycle: regular cycle  GA by LMP 19 w + 6 d  SHAYLEE by LMP: 2018  Assigned: Dating performed on 2017, based on the LMP  Assigned GA 19 w + 6 d  Assigned SHAYLEE: 2018    General Evaluation  ==============    Cardiac activity: present.  bpm.  Fetal movements: visualized.  Presentation: breech.  Placenta: anterior.  Umbilical cord: 3 vessel cord.  Amniotic fluid: normal amount.    Fetal Biometry  ============    Fetal Biometry  CM 3.6 mm  Calculated by: Hadlock (BPD-HC-AC-FL)   bpm  Head / Face / Neck   6.1 mm    Fetal Anatomy  ============    Cranium: normal  Lateral ventricles: normal  Cisterna magna: normal  4-chamber view: normal  Aortic arch: suboptimal  Ductal arch: suboptimal  SVC: normal  IVC: normal  3-vessel-trachea view: normal  Cord insertion: suboptimal  Stomach: normal  Kidneys: normal  Bladder: normal  Cervical spine: suboptimal  Thoracic spine: suboptimal  Lumbar spine: suboptimal  Sacral spine: suboptimal  Lt lower leg: normal  Other: A full anatomic survey has been previously performed but incomplete. See above for those organs that remain suboptimum today    Maternal Structures  ===============    Uterus / Cervix  Funneling: Funneling  absent  Approach: Transvaginal  Cervical length 44.4 mm    Impression  =========    1. 19w 6d zaman intrauterine pregnancy  2. FHR documented  3. limited fetal evaluation: no abnormalities appreciated - see above for those organs suboptimum  4. Amniotic fluid volume wnl per qualtiative assessment  5. Cervical length assessment via TVU wnl .    Recommendation  ==============    Patient scheduled for a f/u sono in 2 wks for f/u anatomy, interval fetal growth, and cervix .

## 2017-09-15 ENCOUNTER — OFFICE VISIT (OUTPATIENT)
Dept: MATERNAL FETAL MEDICINE | Facility: CLINIC | Age: 37
End: 2017-09-15
Attending: OBSTETRICS & GYNECOLOGY
Payer: COMMERCIAL

## 2017-09-15 VITALS
BODY MASS INDEX: 25.32 KG/M2 | SYSTOLIC BLOOD PRESSURE: 112 MMHG | DIASTOLIC BLOOD PRESSURE: 72 MMHG | WEIGHT: 138.44 LBS

## 2017-09-15 DIAGNOSIS — O26.879 SHORT CERVICAL LENGTH DURING PREGNANCY, UNSPECIFIED TRIMESTER: ICD-10-CM

## 2017-09-15 DIAGNOSIS — O09.522 AMA (ADVANCED MATERNAL AGE) MULTIGRAVIDA 35+, SECOND TRIMESTER: ICD-10-CM

## 2017-09-15 DIAGNOSIS — O09.292 PRIOR POOR OBSTETRICAL HISTORY, ANTEPARTUM, SECOND TRIMESTER: ICD-10-CM

## 2017-09-15 PROCEDURE — 99499 UNLISTED E&M SERVICE: CPT | Mod: S$GLB,,, | Performed by: OBSTETRICS & GYNECOLOGY

## 2017-09-15 PROCEDURE — 76817 TRANSVAGINAL US OBSTETRIC: CPT | Mod: S$GLB,,, | Performed by: OBSTETRICS & GYNECOLOGY

## 2017-09-15 PROCEDURE — 99999 PR PBB SHADOW E&M-EST. PATIENT-LVL II: CPT | Mod: PBBFAC,,, | Performed by: OBSTETRICS & GYNECOLOGY

## 2017-09-15 NOTE — LETTER
September 17, 2017      Мария Adams MD  [address]  [address]  [facility] [address]           [facility] - Maternal Fetal [facility]  [address]  [facility] [address]  Phone: [phone]          Patient: Evelia Mishra   MR Number: [id number]   YOB: 1980   Date of Visit: 9/15/2017       Dear Dr. Мария Adams:    Thank you for referring Evelia Mishra to me for evaluation. Attached you will find relevant portions of my assessment and plan of care.    If you have questions, please do not hesitate to call me. I look forward to following Evelia Mishra along with you.    Sincerely,    Yadira Smith MD    Enclosure  CC:  No Recipients    If you would like to receive this communication electronically, please contact [email or web address] or [phone] to request more information on [employer] Link access.    For providers and/or their staff who would like to refer a patient to [facility], please contact us through our one-stop-shop provider referral line, [facility], at [phone].    If you feel you have received this communication in error or would no longer like to receive these types of communications, please e-mail [email or web address]

## 2017-09-19 ENCOUNTER — LAB VISIT (OUTPATIENT)
Dept: LAB | Facility: HOSPITAL | Age: 37
End: 2017-09-19
Attending: OBSTETRICS & GYNECOLOGY
Payer: COMMERCIAL

## 2017-09-19 ENCOUNTER — TELEPHONE (OUTPATIENT)
Dept: MATERNAL FETAL MEDICINE | Facility: CLINIC | Age: 37
End: 2017-09-19

## 2017-09-19 ENCOUNTER — ROUTINE PRENATAL (OUTPATIENT)
Dept: OBSTETRICS AND GYNECOLOGY | Facility: CLINIC | Age: 37
End: 2017-09-19
Payer: COMMERCIAL

## 2017-09-19 VITALS
DIASTOLIC BLOOD PRESSURE: 60 MMHG | BODY MASS INDEX: 25.65 KG/M2 | SYSTOLIC BLOOD PRESSURE: 100 MMHG | WEIGHT: 140.19 LBS

## 2017-09-19 DIAGNOSIS — O99.282: ICD-10-CM

## 2017-09-19 DIAGNOSIS — Z3A.22 PREGNANCY WITH 22 COMPLETED WEEKS GESTATION: Primary | ICD-10-CM

## 2017-09-19 LAB — TSH SERPL DL<=0.005 MIU/L-ACNC: 0.75 UIU/ML

## 2017-09-19 PROCEDURE — 99999 PR PBB SHADOW E&M-EST. PATIENT-LVL II: CPT | Mod: PBBFAC,,, | Performed by: OBSTETRICS & GYNECOLOGY

## 2017-09-19 PROCEDURE — 0502F SUBSEQUENT PRENATAL CARE: CPT | Mod: S$GLB,,, | Performed by: OBSTETRICS & GYNECOLOGY

## 2017-09-19 PROCEDURE — 84443 ASSAY THYROID STIM HORMONE: CPT

## 2017-09-19 PROCEDURE — 36415 COLL VENOUS BLD VENIPUNCTURE: CPT

## 2017-09-19 RX ORDER — LEVOTHYROXINE SODIUM 75 UG/1
75 TABLET ORAL DAILY
Qty: 30 TABLET | Refills: 2 | Status: SHIPPED | OUTPATIENT
Start: 2017-09-19 | End: 2017-12-20 | Stop reason: SDUPTHER

## 2017-09-19 NOTE — PROGRESS NOTES
"Indication  ========    Cervical Length assessment (Dr. Adams).    History  ======    General History  Other: AMA: declined genetic screening or invasive procedure  twin loss at 17 weeks, bleeding during pregnancy  hypothyroid on synthroid  Previous Outcomes   4  Para 1  Ybarra children born living (T) 1  Ybarra children born (T) 1  Abortions (A) 2  Ybarra living children (L) 1  Miscarriages 1  Ectopic 1    Maternal Assessment  =================    Weight 63 kg  Weight (lb) 139 lb  BP syst 112 mmHg  BP diast 72 mmHg    Method  ======    Transabdominal ultrasound examination. View: Sufficient.    Pregnancy  =========    Ybarra pregnancy. Number of fetuses: 1.    Dating  ======    LMP on: 4/15/2017  Cycle: regular cycle  GA by LMP 21 w + 6 d  SHAYLEE by LMP: 2018  "Stated Dating" on: 2017  Type of external assessment: CRL  U/S measurement at external assessment date 5.7 mm  GA at "stated dating" date 6 w + 2 d  GA by "stated dating" 21 w + 5 d  SHAYLEE by "stated dating": 2018  Assigned: Dating performed on 09/15/2017, based on the external assessment (on 2017)  Assigned GA 21 w + 5 d  Assigned SHAYLEE: 2018    General Evaluation  ==============    Cardiac activity: present.  bpm.  Fetal movements: visualized.  Presentation: breech.  Placenta: anterior.  Umbilical cord: 3 vessel cord.  Amniotic fluid: normal amount.    Fetal Biometry  ============    Fetal Biometry  Calculated by: Hadlock (BPD-HC-AC-FL)   bpm    Fetal Anatomy  ============    Aortic arch: normal  Ductal arch: normal  Cord insertion: normal  Stomach: normal  Bladder: normal  Cervical spine: suboptimal  Thoracic spine: suboptimal  Lumbar spine: suboptimal  Sacral spine: suboptimal  Gender: male  Wants to know gender: yes    Maternal Structures  ===============    Uterus / Cervix  Approach: w/o pressure  Cervical length 53.0 mm  Second approach: with pressure  Cervical length (2) 56.0 " mm    Impression  =========    1. 21w 5d zaman intrauterine pregnancy  (revised SHAYLEE = 01- per CRL of 6w 2d sono performed at [facility] - Dr. Brenner)  2. FHR documented  3. Limited fetal evaluation: no abnormalities appreciated - see above for details  4. Cervical length via TVU wnl    Patient counseled on sono evaluation and recommendations. Patient's questions were answered.    Recommendation  ==============    1. We will schedule patient for a f/u sono in 2 wks for f/u anatomy, interval fetal growth, amniotic fluid volume and cervix    2. Revised SHAYLEE = 01- per first trimester sono performed at [facility]  (patient brought documentation form ultrasound performed on 05-)  .

## 2017-09-19 NOTE — TELEPHONE ENCOUNTER
I called patient to inform her that I reviewed the medical records pertaining to her first trimester sono at [facility] -Dr. Brenner. I informed her that we could change her SHAYLEE to 01- per CRL of 5.7mm on 05-.  Questions answered.

## 2017-09-19 NOTE — PROGRESS NOTES
GOOD FM, NO UC AND NO PV LOSS.  HAD USG 9/15 AND NEXT DUE 10/2.  TSH AND REFILL SYNTHROID.  F/U 4 WEEKS

## 2017-10-02 ENCOUNTER — OFFICE VISIT (OUTPATIENT)
Dept: MATERNAL FETAL MEDICINE | Facility: CLINIC | Age: 37
End: 2017-10-02
Attending: OBSTETRICS & GYNECOLOGY
Payer: COMMERCIAL

## 2017-10-02 VITALS
BODY MASS INDEX: 26.09 KG/M2 | SYSTOLIC BLOOD PRESSURE: 100 MMHG | DIASTOLIC BLOOD PRESSURE: 68 MMHG | WEIGHT: 142.63 LBS

## 2017-10-02 DIAGNOSIS — O26.879 SHORT CERVICAL LENGTH DURING PREGNANCY, UNSPECIFIED TRIMESTER: ICD-10-CM

## 2017-10-02 DIAGNOSIS — O99.282 HYPOTHYROIDISM AFFECTING PREGNANCY IN SECOND TRIMESTER: ICD-10-CM

## 2017-10-02 DIAGNOSIS — Z36.3 ENCOUNTER FOR ANTENATAL SCREENING FOR MALFORMATION: ICD-10-CM

## 2017-10-02 DIAGNOSIS — Z36.89 ENCOUNTER FOR ULTRASOUND TO CHECK FETAL GROWTH: Primary | ICD-10-CM

## 2017-10-02 DIAGNOSIS — E03.9 HYPOTHYROIDISM AFFECTING PREGNANCY IN SECOND TRIMESTER: ICD-10-CM

## 2017-10-02 DIAGNOSIS — O09.522 ELDERLY MULTIGRAVIDA IN SECOND TRIMESTER: ICD-10-CM

## 2017-10-02 DIAGNOSIS — O09.292 PRIOR POOR OBSTETRICAL HISTORY, ANTEPARTUM, SECOND TRIMESTER: ICD-10-CM

## 2017-10-02 PROCEDURE — 99499 UNLISTED E&M SERVICE: CPT | Mod: S$GLB,,, | Performed by: OBSTETRICS & GYNECOLOGY

## 2017-10-02 PROCEDURE — 99999 PR PBB SHADOW E&M-EST. PATIENT-LVL II: CPT | Mod: PBBFAC,,, | Performed by: OBSTETRICS & GYNECOLOGY

## 2017-10-02 PROCEDURE — 76816 OB US FOLLOW-UP PER FETUS: CPT | Mod: S$GLB,,, | Performed by: OBSTETRICS & GYNECOLOGY

## 2017-10-02 NOTE — LETTER
October 2, 2017      Мария Adams MD  [address]  [address]  [facility] [address]           [facility] - [facility]aternal [facility]  [address]  [facility] [address]  Phone: [phone]          Patient: Evelia Mishra   MR Number: [id number]   YOB: 1980   Date of Visit: 10/2/2017       Dear Dr. Мария Adams:    Thank you for referring Evelia Mishra to me for evaluation. Attached you will find relevant portions of my assessment and plan of care.    If you have questions, please do not hesitate to call me. I look forward to following Evelia Mishra along with you.    Sincerely,    Yadira Smith MD    Enclosure  CC:  No Recipients    If you would like to receive this communication electronically, please contact [email or web address] or [phone] to request more information on [employer] Link access.    For providers and/or their staff who would like to refer a patient to [facility], please contact us through our one-stop-shop provider referral line, [facility], at [phone].    If you feel you have received this communication in error or would no longer like to receive these types of communications, please e-mail [email or web address]

## 2017-10-02 NOTE — PROGRESS NOTES
"Indication  ========    Evaluation of fetal growth, f/u anatomy (Dr. Adams).    History  ======    General History  Other: AMA: declined genetic screening or invasive procedure  twin loss at 17 weeks, bleeding during pregnancy  hypothyroid on synthroid  Previous Outcomes   4  Para 1  Ybarra children born living (T) 1  Ybarra children born (T) 1  Abortions (A) 2  Ybarra living children (L) 1  Miscarriages 1  Ectopic 1    Maternal Assessment  =================    Weight 65 kg  Weight (lb) 143 lb  BP syst 100 mmHg  BP diast 68 mmHg    Method  ======    Transabdominal ultrasound examination.    Pregnancy  =========    Ybarra pregnancy. Number of fetuses: 1.    Dating  ======    LMP on: 4/15/2017  Cycle: regular cycle  GA by LMP 24 w + 2 d  SHAYLEE by LMP: 2018  "Stated Dating" on: 2017  Type of external assessment: CRL  U/S measurement at external assessment date 5.7 mm  GA at "stated dating" date 6 w + 2 d  GA by "stated dating" 24 w + 1 d  SHAYLEE by "stated dating": 2018  Ultrasound examination on: 10/2/2017  GA by U/S based upon: AC, BPD, Femur, HC  GA by U/S 26 w + 2 d  SHAYLEE by U/S: 2018  Assigned: Dating performed on 09/15/2017, based on the external assessment (on 2017)  Assigned GA 24 w + 1 d  Assigned SHAYLEE: 2018    General Evaluation  ==============    Cardiac activity: present.  bpm.  Fetal movements: visualized.  Presentation: cephalic.  Placenta:  Placental site: anterior, fundal.  Umbilical cord: Cord vessels: 3 vessel cord.    Fetal Biometry  ============    Fetal Biometry  BPD 67.4 mm 27w 1d Hadlock  OFD 83.6 mm 27w 1d Brianna  .2 mm 26w 3d Hadlock  .3 mm 26w 0d Hadlock  Femur 46.6 mm 25w 4d Hadlock  Cerebellum tr 28.9 mm 26w 5d Dennis  CM 4.9 mm   g 77% Vernon  Calculated by: Hadlock (BPD-HC-AC-FL)  EFW (lb) 1 lb  EFW (oz) 15 oz  Cephalic index 0.81  HC / AC 1.13  FL / BPD 0.69  FL / AC 0.22   bpm  Head / Face / Neck   4.0 " mm    Fetal Anatomy  ============    Cranium: normal  Cavum septi pellucidi: normal  Posterior fossa: normal  4-chamber view: normal  RVOT: normal  LVOT: normal  3-vessel view: normal  Stomach: normal  Kidneys: normal  Bladder: normal  Cervical spine: normal  Thoracic spine: normal  Lumbar spine: normal  Sacral spine: normal  Gender: male  Wants to know gender: yes  Other: A full anatomy survey previously performed.    Impression  =========    1. 24w 1d zaman intrauterine pregnancy  2. Normal interval fetal growth (EFW = 886 gms at 77%)  3. limited f/u anatomy: no abnormalities appreciated - see above for details  4. Amniotic fluid volume wnl per qualitative assessment .    Recommendation  ==============    1. We will schedule patient for a f/u sono with MFM in about 6 -7 wks for interval fetal growth, MVP, and overall fetal well being    2. hypothyroidism on synthroid 75 micrograms: management per Dr. Adams  (last TSH on 9/19 = 0.749).

## 2017-10-16 ENCOUNTER — PATIENT MESSAGE (OUTPATIENT)
Dept: MATERNAL FETAL MEDICINE | Facility: CLINIC | Age: 37
End: 2017-10-16

## 2017-10-17 ENCOUNTER — ROUTINE PRENATAL (OUTPATIENT)
Dept: OBSTETRICS AND GYNECOLOGY | Facility: CLINIC | Age: 37
End: 2017-10-17
Payer: COMMERCIAL

## 2017-10-17 VITALS
SYSTOLIC BLOOD PRESSURE: 114 MMHG | DIASTOLIC BLOOD PRESSURE: 62 MMHG | BODY MASS INDEX: 26.21 KG/M2 | WEIGHT: 143.31 LBS

## 2017-10-17 DIAGNOSIS — E03.9 HYPOTHYROIDISM AFFECTING PREGNANCY IN THIRD TRIMESTER: ICD-10-CM

## 2017-10-17 DIAGNOSIS — Z23 NEED FOR TDAP VACCINATION: Primary | ICD-10-CM

## 2017-10-17 DIAGNOSIS — O09.523 ELDERLY MULTIGRAVIDA IN THIRD TRIMESTER: ICD-10-CM

## 2017-10-17 DIAGNOSIS — O99.283 HYPOTHYROIDISM AFFECTING PREGNANCY IN THIRD TRIMESTER: ICD-10-CM

## 2017-10-17 DIAGNOSIS — O09.291 PRIOR POOR OBSTETRICAL HISTORY IN FIRST TRIMESTER, ANTEPARTUM: ICD-10-CM

## 2017-10-17 DIAGNOSIS — Z3A.26 26 WEEKS GESTATION OF PREGNANCY: ICD-10-CM

## 2017-10-17 DIAGNOSIS — O09.90 SUPERVISION OF HIGH RISK PREGNANCY, ANTEPARTUM: Primary | ICD-10-CM

## 2017-10-17 PROCEDURE — 0502F SUBSEQUENT PRENATAL CARE: CPT | Mod: S$GLB,,, | Performed by: NURSE PRACTITIONER

## 2017-10-17 PROCEDURE — 99999 PR PBB SHADOW E&M-EST. PATIENT-LVL II: CPT | Mod: PBBFAC,,, | Performed by: NURSE PRACTITIONER

## 2017-10-17 NOTE — PROGRESS NOTES
Reports pulling / sharp pains right side with position changes and discussed round ligament pain; Doesn't feel M belt helps; Comfort measures discussed; Also reports occ BH ctxns, no lof, vb; PTL/FMC//PIH precautions; DM screen, CBC Tdap with next visit in 2 weeks; Will attend the flu fair at [facility] in the meantime and [facility] 11-16-17.

## 2017-10-31 ENCOUNTER — LAB VISIT (OUTPATIENT)
Dept: LAB | Facility: HOSPITAL | Age: 37
End: 2017-10-31
Payer: COMMERCIAL

## 2017-10-31 ENCOUNTER — ROUTINE PRENATAL (OUTPATIENT)
Dept: OBSTETRICS AND GYNECOLOGY | Facility: CLINIC | Age: 37
End: 2017-10-31
Payer: COMMERCIAL

## 2017-10-31 ENCOUNTER — CLINICAL SUPPORT (OUTPATIENT)
Dept: INFECTIOUS DISEASES | Facility: CLINIC | Age: 37
End: 2017-10-31
Payer: COMMERCIAL

## 2017-10-31 VITALS — DIASTOLIC BLOOD PRESSURE: 68 MMHG | SYSTOLIC BLOOD PRESSURE: 110 MMHG | WEIGHT: 145.5 LBS | BODY MASS INDEX: 26.61 KG/M2

## 2017-10-31 DIAGNOSIS — Z3A.28 PREGNANCY WITH 28 COMPLETED WEEKS GESTATION: Primary | ICD-10-CM

## 2017-10-31 DIAGNOSIS — Z23 NEED FOR VACCINATION: Primary | ICD-10-CM

## 2017-10-31 DIAGNOSIS — Z3A.26 26 WEEKS GESTATION OF PREGNANCY: ICD-10-CM

## 2017-10-31 DIAGNOSIS — Z23 NEED FOR TDAP VACCINATION: ICD-10-CM

## 2017-10-31 LAB
BASOPHILS # BLD AUTO: 0.03 K/UL
BASOPHILS NFR BLD: 0.4 %
DIFFERENTIAL METHOD: ABNORMAL
EOSINOPHIL # BLD AUTO: 0.2 K/UL
EOSINOPHIL NFR BLD: 2.1 %
ERYTHROCYTE [DISTWIDTH] IN BLOOD BY AUTOMATED COUNT: 13.2 %
GLUCOSE SERPL-MCNC: 213 MG/DL
HCT VFR BLD AUTO: 34.1 %
HGB BLD-MCNC: 11.4 G/DL
IMM GRANULOCYTES # BLD AUTO: 0.11 K/UL
IMM GRANULOCYTES NFR BLD AUTO: 1.6 %
LYMPHOCYTES # BLD AUTO: 1.5 K/UL
LYMPHOCYTES NFR BLD: 21.3 %
MCH RBC QN AUTO: 30.7 PG
MCHC RBC AUTO-ENTMCNC: 33.4 G/DL
MCV RBC AUTO: 92 FL
MONOCYTES # BLD AUTO: 0.4 K/UL
MONOCYTES NFR BLD: 5.9 %
NEUTROPHILS # BLD AUTO: 4.9 K/UL
NEUTROPHILS NFR BLD: 68.7 %
NRBC BLD-RTO: 0 /100 WBC
PLATELET # BLD AUTO: 195 K/UL
PMV BLD AUTO: 10.1 FL
RBC # BLD AUTO: 3.71 M/UL
WBC # BLD AUTO: 7.09 K/UL

## 2017-10-31 PROCEDURE — 90472 IMMUNIZATION ADMIN EACH ADD: CPT | Mod: S$GLB,,, | Performed by: INTERNAL MEDICINE

## 2017-10-31 PROCEDURE — 90715 TDAP VACCINE 7 YRS/> IM: CPT | Mod: S$GLB,,, | Performed by: INTERNAL MEDICINE

## 2017-10-31 PROCEDURE — 99999 PR PBB SHADOW E&M-EST. PATIENT-LVL I: CPT | Mod: PBBFAC,,, | Performed by: OBSTETRICS & GYNECOLOGY

## 2017-10-31 PROCEDURE — 90686 IIV4 VACC NO PRSV 0.5 ML IM: CPT | Mod: S$GLB,,, | Performed by: INTERNAL MEDICINE

## 2017-10-31 PROCEDURE — 82950 GLUCOSE TEST: CPT

## 2017-10-31 PROCEDURE — 36415 COLL VENOUS BLD VENIPUNCTURE: CPT

## 2017-10-31 PROCEDURE — 90471 IMMUNIZATION ADMIN: CPT | Mod: S$GLB,,, | Performed by: INTERNAL MEDICINE

## 2017-10-31 PROCEDURE — 0502F SUBSEQUENT PRENATAL CARE: CPT | Mod: S$GLB,,, | Performed by: OBSTETRICS & GYNECOLOGY

## 2017-10-31 PROCEDURE — 85025 COMPLETE CBC W/AUTO DIFF WBC: CPT

## 2017-10-31 NOTE — PROGRESS NOTES
Pt received the Tdap and influenza vaccination (PF). VIS provided. Pt tolerated injections well. Pt observed for 15 minutes. Pt left the unit in NAD.

## 2017-11-01 ENCOUNTER — PATIENT MESSAGE (OUTPATIENT)
Dept: OBSTETRICS AND GYNECOLOGY | Facility: CLINIC | Age: 37
End: 2017-11-01

## 2017-11-01 DIAGNOSIS — O99.810 ABNORMAL GLUCOSE TOLERANCE TEST IN PREGNANCY: Primary | ICD-10-CM

## 2017-11-02 ENCOUNTER — TELEPHONE (OUTPATIENT)
Dept: OBSTETRICS AND GYNECOLOGY | Facility: CLINIC | Age: 37
End: 2017-11-02

## 2017-11-02 NOTE — TELEPHONE ENCOUNTER
Called pt and schedule her 3 hour fasting glucose test for 11/8/17 at 8:15am.  Pt was given directions to start fasting after 10pm the night before.  Pt told to arrive at [facility] and go to the lab across from [employer].  Pt verbalized understanding.

## 2017-11-08 ENCOUNTER — LAB VISIT (OUTPATIENT)
Dept: LAB | Facility: OTHER | Age: 37
End: 2017-11-08
Attending: OBSTETRICS & GYNECOLOGY
Payer: COMMERCIAL

## 2017-11-08 DIAGNOSIS — O99.810 ABNORMAL GLUCOSE TOLERANCE TEST IN PREGNANCY: ICD-10-CM

## 2017-11-08 LAB
GLUCOSE SERPL-MCNC: 139 MG/DL
GLUCOSE SERPL-MCNC: 213 MG/DL
GLUCOSE SERPL-MCNC: 221 MG/DL
GLUCOSE SERPL-MCNC: 85 MG/DL

## 2017-11-08 PROCEDURE — 36415 COLL VENOUS BLD VENIPUNCTURE: CPT

## 2017-11-08 PROCEDURE — 82951 GLUCOSE TOLERANCE TEST (GTT): CPT

## 2017-11-09 ENCOUNTER — PATIENT MESSAGE (OUTPATIENT)
Dept: OBSTETRICS AND GYNECOLOGY | Facility: CLINIC | Age: 37
End: 2017-11-09

## 2017-11-09 DIAGNOSIS — O24.419 GESTATIONAL DIABETES MELLITUS (GDM) IN THIRD TRIMESTER, GESTATIONAL DIABETES METHOD OF CONTROL UNSPECIFIED: ICD-10-CM

## 2017-11-11 PROBLEM — O24.419 GESTATIONAL DIABETES MELLITUS (GDM) IN THIRD TRIMESTER: Status: ACTIVE | Noted: 2017-11-11

## 2017-11-11 RX ORDER — INSULIN PUMP SYRINGE, 3 ML
EACH MISCELLANEOUS
Qty: 1 EACH | Refills: 0 | Status: SHIPPED | OUTPATIENT
Start: 2017-11-11 | End: 2017-11-13 | Stop reason: SDUPTHER

## 2017-11-13 ENCOUNTER — TELEPHONE (OUTPATIENT)
Dept: OBSTETRICS AND GYNECOLOGY | Facility: CLINIC | Age: 37
End: 2017-11-13

## 2017-11-13 ENCOUNTER — ROUTINE PRENATAL (OUTPATIENT)
Dept: OBSTETRICS AND GYNECOLOGY | Facility: CLINIC | Age: 37
End: 2017-11-13
Payer: COMMERCIAL

## 2017-11-13 VITALS — DIASTOLIC BLOOD PRESSURE: 64 MMHG | SYSTOLIC BLOOD PRESSURE: 98 MMHG | BODY MASS INDEX: 27.58 KG/M2 | WEIGHT: 150.81 LBS

## 2017-11-13 DIAGNOSIS — O24.410 DIET CONTROLLED GESTATIONAL DIABETES MELLITUS (GDM) IN THIRD TRIMESTER: ICD-10-CM

## 2017-11-13 DIAGNOSIS — O09.291 PRIOR POOR OBSTETRICAL HISTORY IN FIRST TRIMESTER, ANTEPARTUM: ICD-10-CM

## 2017-11-13 DIAGNOSIS — O99.283 HYPOTHYROIDISM AFFECTING PREGNANCY IN THIRD TRIMESTER: ICD-10-CM

## 2017-11-13 DIAGNOSIS — O09.523 ELDERLY MULTIGRAVIDA IN THIRD TRIMESTER: Primary | ICD-10-CM

## 2017-11-13 DIAGNOSIS — E03.9 HYPOTHYROIDISM AFFECTING PREGNANCY IN THIRD TRIMESTER: ICD-10-CM

## 2017-11-13 DIAGNOSIS — O09.90 SUPERVISION OF HIGH RISK PREGNANCY, ANTEPARTUM: ICD-10-CM

## 2017-11-13 DIAGNOSIS — Z3A.30 30 WEEKS GESTATION OF PREGNANCY: ICD-10-CM

## 2017-11-13 PROCEDURE — 99999 PR PBB SHADOW E&M-EST. PATIENT-LVL III: CPT | Mod: PBBFAC,,, | Performed by: NURSE PRACTITIONER

## 2017-11-13 PROCEDURE — 0502F SUBSEQUENT PRENATAL CARE: CPT | Mod: S$GLB,,, | Performed by: NURSE PRACTITIONER

## 2017-11-13 RX ORDER — INSULIN PUMP SYRINGE, 3 ML
EACH MISCELLANEOUS
Qty: 1 EACH | Refills: 0 | Status: SHIPPED | OUTPATIENT
Start: 2017-11-13 | End: 2017-11-14 | Stop reason: SDUPTHER

## 2017-11-13 NOTE — PROGRESS NOTES
Discussed GDM and BS parameters; DM education class scheduled; Glucose meter already sent to pharmacy and test strips and lancets sent today; Denies cxtns, lof, vb; PTL/FMC/PIH precautions; No c/o; Has MFM US 11-16-17 and visit in 2 weeks - to bring log.

## 2017-11-13 NOTE — TELEPHONE ENCOUNTER
----- Message from Brittani Spangler sent at 11/13/2017  3:57 PM CST -----  Contact: Patient   X _1st Request  _  2nd Request  _  3rd Request    Who:GALILEA LAW [[id number]]    Why:Patient states she needs the medication blood sugar diagnostic Strp and lancets 23 gauge [facility] called into the [facility] on the [address]     What Number to Call Back:[phone]    When to Expect a call back: (Before the end of the day)   -- if call after 3:00 call back will be tomorrow.      Done. Gh

## 2017-11-14 ENCOUNTER — OFFICE VISIT (OUTPATIENT)
Dept: ENDOCRINOLOGY | Facility: CLINIC | Age: 37
End: 2017-11-14
Payer: COMMERCIAL

## 2017-11-14 VITALS
BODY MASS INDEX: 27.67 KG/M2 | WEIGHT: 150.38 LBS | SYSTOLIC BLOOD PRESSURE: 116 MMHG | HEART RATE: 68 BPM | HEIGHT: 62 IN | DIASTOLIC BLOOD PRESSURE: 70 MMHG

## 2017-11-14 DIAGNOSIS — O99.283 HYPOTHYROIDISM AFFECTING PREGNANCY IN THIRD TRIMESTER: ICD-10-CM

## 2017-11-14 DIAGNOSIS — E03.9 HYPOTHYROIDISM AFFECTING PREGNANCY IN THIRD TRIMESTER: ICD-10-CM

## 2017-11-14 DIAGNOSIS — O24.410 DIET CONTROLLED GESTATIONAL DIABETES MELLITUS (GDM) IN THIRD TRIMESTER: Primary | ICD-10-CM

## 2017-11-14 PROCEDURE — 99999 PR PBB SHADOW E&M-EST. PATIENT-LVL III: CPT | Mod: PBBFAC,,, | Performed by: INTERNAL MEDICINE

## 2017-11-14 PROCEDURE — 99244 OFF/OP CNSLTJ NEW/EST MOD 40: CPT | Mod: S$GLB,,, | Performed by: INTERNAL MEDICINE

## 2017-11-14 RX ORDER — INSULIN PUMP SYRINGE, 3 ML
EACH MISCELLANEOUS
Qty: 1 EACH | Refills: 1 | Status: SHIPPED | OUTPATIENT
Start: 2017-11-14 | End: 2018-08-22

## 2017-11-14 RX ORDER — LANCETS
1 EACH MISCELLANEOUS
Qty: 200 EACH | Refills: 3 | Status: SHIPPED | OUTPATIENT
Start: 2017-11-14 | End: 2018-08-22

## 2017-11-14 NOTE — LETTER
November 14, 2017      ARIA García, CLAUDIA  [address]  [facility] [address]           [facility] - Endo/Diab/Metab  [address]  [facility] [address]  Phone: [phone]  Fax: [phone]          Patient: Evelia Mishra   MR Number: [id number]   YOB: 1980   Date of Visit: 11/14/2017       Dear ARIA García:    Thank you for referring Evelia Mishra to me for evaluation. Attached you will find relevant portions of my assessment and plan of care.    If you have questions, please do not hesitate to call me. I look forward to following Evelia Mishra along with you.    Sincerely,    Bandar Frye MD    Enclosure  CC:  No Recipients    If you would like to receive this communication electronically, please contact [email or web address] or [phone] to request more information on [employer] Link access.    For providers and/or their staff who would like to refer a patient to [facility], please contact us through our one-stop-shop provider referral line, [facility], at [phone].    If you feel you have received this communication in error or would no longer like to receive these types of communications, please e-mail [email or web address]

## 2017-11-14 NOTE — PROGRESS NOTES
Subjective:      Patient ID: Evelia Mishra is a 37 y.o. female.    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     Has active history of hypothyroidism on thyroid hormone.     She is 30 weeks pregnant.    Was found to have positive 1 hour OGTT. Then had further testing with 3 hour OGTT which showed an elevated glucose at 1 and 2 hours post ingestion.   She delivered her first child 11 years ago and did not have gestational DM with that pregnancy.    Has strong family history of type 2 diabetes.     Currently on 75 mcg of levothyroxine for hypothyroidism and TSH levels have been at goal.    Weight is currently slightly below IBW for this stage of pregnancy.     Review of Systems   Constitutional: Negative for unexpected weight change.   HENT: Negative for voice change.    Eyes: Negative for visual disturbance.   Respiratory: Negative for shortness of breath.    Cardiovascular: Negative for chest pain.   Gastrointestinal: Negative for abdominal pain.   Endocrine: Negative for cold intolerance.   Genitourinary: Negative for frequency.   Musculoskeletal: Negative for myalgias.   Skin: Negative for rash.       Objective:   Physical Exam   Constitutional: She is oriented to person, place, and time. She appears well-developed and well-nourished.   HENT:   Head: Normocephalic and atraumatic.   Right Ear: External ear normal.   Left Ear: External ear normal.   Nose: Nose normal.   Neck: No tracheal deviation present. No thyromegaly present.   Cardiovascular: Normal rate, regular rhythm and normal heart sounds.    No edema   Pulmonary/Chest: Effort normal and breath sounds normal.   Abdominal: Soft. Bowel sounds are normal. There is no tenderness.   Musculoskeletal:   Normal gait, no cyanosis or clubbing   Neurological: She is alert and oriented to person, place, and time. She has normal  reflexes.   Vibration sense intact   Skin: Skin is warm and dry. No rash noted.   No nodules, no ulcers  No acanthosis at the neck   Psychiatric: She has a normal mood and affect. Judgment normal.   Vitals reviewed.      Lab Review:   Results for GALILEA LAW (MRN [id number]) as of 11/14/2017 07:52   Ref. Range 6/27/2017 16:00 9/19/2017 10:30 10/31/2017 09:55 11/8/2017 08:15   Vit D, 25-Hydroxy Latest Ref Range: 30 - 96 ng/mL 28 (L)      OB Glucose Screen Latest Ref Range: 70 - 140 mg/dL   213 (H)    Gluc Fast Latest Ref Range: 70 - 110 mg/dL    85   Gluc 1 HR Latest Units: mg/dL    213   Gluc 2 HR Latest Units: mg/dL    221   Gluc 3 HR Latest Units: mg/dL    139   TSH Latest Ref Range: 0.400 - 4.000 uIU/mL 1.061 0.749         Assessment:     1. Diet controlled gestational diabetes mellitus (GDM) in third trimester    2. Hypothyroidism affecting pregnancy in third trimester      Plan:     1.) Currently 30 weeks pregnant with gestational diabetes mellitus  --Given supplies to check glucoses  --Reviewed glucose goals in pregnancy (<90 fasting, < 140 1 hr post prandial, and <120 2 hr post prandial)  --She has logs and will check glucoses 4 times daily  --Briefly reviewed diet and expected caloric intake with GDM, she has appt with diabetes education on 11/16/2017   --May be able to control glucoses with diet only  --will have low threshold to start metformin if glucoses not at goal in the next week  --She will send me log for review in 5-7 days    2.) TSH has been at goal for the duration of the pregnancy  --To continue levothyroxine 75 mcg PO daily    Copy to Nataliya Frye M.D. Staff Endocrinology

## 2017-11-16 ENCOUNTER — CLINICAL SUPPORT (OUTPATIENT)
Dept: DIABETES | Facility: CLINIC | Age: 37
End: 2017-11-16
Payer: COMMERCIAL

## 2017-11-16 ENCOUNTER — OFFICE VISIT (OUTPATIENT)
Dept: MATERNAL FETAL MEDICINE | Facility: CLINIC | Age: 37
End: 2017-11-16
Attending: OBSTETRICS & GYNECOLOGY
Payer: COMMERCIAL

## 2017-11-16 VITALS
BODY MASS INDEX: 27.22 KG/M2 | SYSTOLIC BLOOD PRESSURE: 108 MMHG | WEIGHT: 148.81 LBS | DIASTOLIC BLOOD PRESSURE: 70 MMHG

## 2017-11-16 DIAGNOSIS — O09.523 ELDERLY MULTIGRAVIDA IN THIRD TRIMESTER: Chronic | ICD-10-CM

## 2017-11-16 DIAGNOSIS — E03.9 HYPOTHYROIDISM AFFECTING PREGNANCY IN THIRD TRIMESTER: ICD-10-CM

## 2017-11-16 DIAGNOSIS — O24.410 DIET CONTROLLED GESTATIONAL DIABETES MELLITUS (GDM) IN THIRD TRIMESTER: ICD-10-CM

## 2017-11-16 DIAGNOSIS — O99.283 HYPOTHYROIDISM AFFECTING PREGNANCY IN THIRD TRIMESTER: ICD-10-CM

## 2017-11-16 DIAGNOSIS — Z36.89 ENCOUNTER FOR ULTRASOUND TO CHECK FETAL GROWTH: ICD-10-CM

## 2017-11-16 DIAGNOSIS — Z36.4 ANTENATAL SCREENING FOR FETAL GROWTH RETARDATION USING ULTRASONICS: ICD-10-CM

## 2017-11-16 PROCEDURE — 97802 MEDICAL NUTRITION INDIV IN: CPT | Mod: S$GLB,,, | Performed by: INTERNAL MEDICINE

## 2017-11-16 PROCEDURE — 99999 PR PBB SHADOW E&M-EST. PATIENT-LVL II: CPT | Mod: PBBFAC,,,

## 2017-11-16 PROCEDURE — 99214 OFFICE O/P EST MOD 30 MIN: CPT | Mod: 25,S$GLB,, | Performed by: OBSTETRICS & GYNECOLOGY

## 2017-11-16 PROCEDURE — 76816 OB US FOLLOW-UP PER FETUS: CPT | Mod: S$GLB,,, | Performed by: OBSTETRICS & GYNECOLOGY

## 2017-11-16 PROCEDURE — 99999 PR PBB SHADOW E&M-EST. PATIENT-LVL III: CPT | Mod: PBBFAC,,, | Performed by: OBSTETRICS & GYNECOLOGY

## 2017-11-16 NOTE — PROGRESS NOTES
Diabetes Education  Author: Rose Garcia RD, CDE  Date: 11/16/2017    Diabetes Education Visit  Diabetes Education Record Assessment/Progress: MNT Initial    Diabetes Type  Diabetes Type : Gestational    Nutrition  Meal Planning: 3 meals per day, snacks between meal, water (May have a small snack between meals)    Monitoring   Self Monitoring :  (Just started checking 2 days ago - 4 times a day; BGs checked in target)    Exercise   Exercise Type:  (ADLs around the house)    Current Diabetes Treatment   Current Treatment: Diet    Diabetes Education Assessment/Progress  Diabetes Disease Process (diabetes disease process and treatment options): Instructed, Discussion, Individual Session  Nutrition (Incorporating nutritional management into one's lifestyle): Instructed, Discussion, Individual Session, Written Materials Provided (Instructed patient on CHO counting, label reading, and addt'l resources to assist w/ CHO counting; plate method discussed as welll)  Physical Activity (incorporating physical activity into one's lifestyle): Instructed, Discussion, Individual Session, Written Materials Provided (Reviewed general guidelines - walking - aim for 30 min daily)    Goals  Patient has selected/evaluated goals during today's session: No    Diabetes Care Plan/Intervention  Education Plan/Intervention:  (Patient to call with questions or concerns)    Diabetes Meal Plan  Carbohydrate Per Meal: 30-45g, 45-60g  Carbohydrate Per Snack : 15-30g    Education Units of Time   Time Spent: 30 min      Health Maintenance Topics with due status: Not Due       Topic Last Completion Date    Pap Smear with HPV Cotest 09/28/2016    TETANUS VACCINE 10/31/2017     There are no preventive care reminders to display for this patient.

## 2017-11-20 NOTE — PROGRESS NOTES
"Indication  ========    GDM -diet, AMA: sono for interval fetal growth, MVP (Dr. Adams).    History  ======    General History  Other: GDM - diet  AMA: declined genetic screening or invasive procedure  twin loss at 17 weeks, bleeding during pregnancy  hypothyroid on synthroid  Previous Outcomes   4  Para 1  Ybarra children born living (T) 1  Ybarra children born (T) 1  Abortions (A) 2  Ybarra living children (L) 1  Miscarriages 1  Ectopic 1    Maternal Assessment  =================    Weight 67 kg  Weight (lb) 148 lb  BP syst 108 mmHg  BP diast 70 mmHg    Method  ======    Transabdominal ultrasound examination. View: Sufficient.    Pregnancy  =========    Ybarra pregnancy. Number of fetuses: 1.    Dating  ======    LMP on: 4/15/2017  Cycle: regular cycle  GA by LMP 30 w + 5 d  SHAYLEE by LMP: 2018  "Stated Dating" on: 2017  Type of external assessment: CRL  U/S measurement at external assessment date 5.7 mm  GA at "stated dating" date 6 w + 2 d  GA by "stated dating" 30 w + 4 d  SHAYLEE by "stated dating": 2018  Ultrasound examination on: 2017  GA by U/S based upon: AC, BPD, Femur, HC  GA by U/S 33 w + 2 d  SHAYLEE by U/S: 2018  Assigned: Dating performed on 09/15/2017, based on the external assessment (on 2017)  Assigned GA 30 w + 4 d  Assigned SHAYLEE: 2018    General Evaluation  ==============    Cardiac activity: present.  bpm.  Fetal movements: visualized.  Presentation: breech.  Placenta:  Placental site: anterior.  Umbilical cord: Cord vessels: 3 vessel cord.  Amniotic fluid: MVP 6.3 cm.    Biophysical Profile  ==============    2: Fetal breathing movements  2: Gross body movements  2: Fetal tone  2: Amniotic fluid volume  : Biophysical profile score    Fetal Biometry  ============    Fetal Biometry  BPD 83.7 mm 33w 5d Hadlock  .6 mm 35w 6d Brianna  .0 mm 34w 3d Hadlock  .6 mm 33w 0d Hadlock  Femur 61.4 mm 31w 6d Hadlock  CM 3.9 mm  EFW 2,066 " g 63% Vernon  Calculated by: Hadlock (BPD-HC-AC-FL)  EFW (lb) 4 lb  EFW (oz) 9 oz  Cephalic index 0.77  HC / AC 1.07  FL / BPD 0.73  FL / AC 0.21  MVP 6.3 cm   bpm  Head / Face / Neck   4.0 mm    Fetal Anatomy  ============    Cisterna magna: normal  Posterior fossa: normal  4-chamber view: normal  Stomach: normal  Kidneys: normal  Bladder: normal  Gender: male  Wants to know gender: yes  Other: A full anatomic survey has been previously performed.    Consultation  ==========    recent dx of GDM    -patient reports has appt with dietician today and with Endocrine (Dr. Frye)  -patient reports the following accuchecks:  FBS: 85, 97  2hr PP lunch: 91  2hr PP dinner: 120, 111    Ultrasound performed    counseling: GDM  Patient counseled on GDM and maternal/fetal risks. She was counseled on accucheck goals. She was advised on exercise (ex. walking) after  meals and before 2hr postprandial checks. She was advised that if abnormal accuchecks, recommendations include starting either insulin or  consideration of an oral agent. She was counseled on fetal surveillance and timing depends on whether she is diet controlled (starting at 40wks  gestation) or if on insulin or oral agent (starting at 32 wks gestation). Patient counseled on recommendation for serial sonos -interval fetal  growth, MVP, and overall fetal well being. Patient's father was present for consultation. Their questions were extensively answered. She reports  she her GDM is being managed by Endocrine.    I overall spent approximately 25* minutes in face to face time with the patient and her family, greater than 50% of which was in counseling and  care coordination.      Impression  =========    1. 30w 4d zaman intrauterine pregnancy  2. Normal interval fetal growth (EFW = 2066 gms at 63%)  3. Limited f/u anatomy: no abnormalities appreciated  4. Amniotic fluid volume wnl (MVP 6.3 cm)  5. BPP without NST = 8/8    Patient counseled on sono  evaluation and recommendations .    Recommendation  ==============    1. GDM:  -recommend serial sonos every 4 wks for interval fetal growth, MVP, and overall fetal well being (MFM will schedule sonos)  -recommend close monitoring of accuchecks (goals include FBS <95, 2hr PP < 120)  -patient is seeing dietician today for diabetic teaching and she reports Endocrine is managing her GDM  -fetal surveillance:  if diet alone: start at 40 wks (NST twice a week, weekly MVP or BPP weekly)  if insulin or oral agent: 32 wks (NST twice a week with weekly MVP or BPP weekly)  (MFM available if need further assistance or desire us to monitor her glucose logs other than when she comes in for her ultrasounds)    2. hypothyroidism on synthroid: management per Endocrine  .

## 2017-11-29 ENCOUNTER — ROUTINE PRENATAL (OUTPATIENT)
Dept: OBSTETRICS AND GYNECOLOGY | Facility: CLINIC | Age: 37
End: 2017-11-29
Payer: COMMERCIAL

## 2017-11-29 VITALS
DIASTOLIC BLOOD PRESSURE: 68 MMHG | BODY MASS INDEX: 27.42 KG/M2 | WEIGHT: 149.94 LBS | SYSTOLIC BLOOD PRESSURE: 104 MMHG

## 2017-11-29 DIAGNOSIS — O24.410 DIET CONTROLLED GESTATIONAL DIABETES MELLITUS (GDM) IN THIRD TRIMESTER: ICD-10-CM

## 2017-11-29 DIAGNOSIS — O99.283 HYPOTHYROIDISM AFFECTING PREGNANCY IN THIRD TRIMESTER: ICD-10-CM

## 2017-11-29 DIAGNOSIS — Z3A.32 32 WEEKS GESTATION OF PREGNANCY: Primary | ICD-10-CM

## 2017-11-29 DIAGNOSIS — E03.9 HYPOTHYROIDISM AFFECTING PREGNANCY IN THIRD TRIMESTER: ICD-10-CM

## 2017-11-29 PROCEDURE — 99999 PR PBB SHADOW E&M-EST. PATIENT-LVL I: CPT | Mod: PBBFAC,,, | Performed by: OBSTETRICS & GYNECOLOGY

## 2017-11-29 PROCEDURE — 0502F SUBSEQUENT PRENATAL CARE: CPT | Mod: S$GLB,,, | Performed by: OBSTETRICS & GYNECOLOGY

## 2017-11-29 NOTE — PROGRESS NOTES
GOOD FM, NO STRONG UC - JUST MILD B-H CONTRACTIONS AND NO PV LOSS.  FSBS VALUES ARE ALMOST ENTIRELY IN RANGE, NEEDS REFILL TESTSTRIPS.   . DUE TSH TESTING.  APPT FOR F/U GROWTH USG 12/15.  REASSURED RE CURRENT BREECH PRESENTATION AND DISCUSSED PP CIRCUMCISION.  F/U 2 WEEKS

## 2017-11-29 NOTE — Clinical Note
I JUST SIGNED HER ORDER FOR TSH TESTING AND SENT CHEMSTRIPS TO HER PHARMACY - SHE MAY HAVE ALREADY CALLED RE EITHER/BOTH.  IF NOT ALREADY SCHEDULED, WOULD YOU PLS SCHEDULE BLOOD TESTING - TSH FOR HER TODAY OR TOMORROW OR Monday?

## 2017-12-01 ENCOUNTER — LAB VISIT (OUTPATIENT)
Dept: LAB | Facility: HOSPITAL | Age: 37
End: 2017-12-01
Attending: OBSTETRICS & GYNECOLOGY
Payer: COMMERCIAL

## 2017-12-01 DIAGNOSIS — E03.9 HYPOTHYROIDISM AFFECTING PREGNANCY IN THIRD TRIMESTER: ICD-10-CM

## 2017-12-01 DIAGNOSIS — O99.283 HYPOTHYROIDISM AFFECTING PREGNANCY IN THIRD TRIMESTER: ICD-10-CM

## 2017-12-01 LAB — TSH SERPL DL<=0.005 MIU/L-ACNC: 1.16 UIU/ML

## 2017-12-01 PROCEDURE — 84443 ASSAY THYROID STIM HORMONE: CPT

## 2017-12-01 PROCEDURE — 36415 COLL VENOUS BLD VENIPUNCTURE: CPT | Mod: PO

## 2017-12-12 ENCOUNTER — TELEPHONE (OUTPATIENT)
Dept: INTERNAL MEDICINE | Facility: CLINIC | Age: 37
End: 2017-12-12

## 2017-12-12 ENCOUNTER — ROUTINE PRENATAL (OUTPATIENT)
Dept: OBSTETRICS AND GYNECOLOGY | Facility: CLINIC | Age: 37
End: 2017-12-12
Payer: COMMERCIAL

## 2017-12-12 VITALS
BODY MASS INDEX: 28.43 KG/M2 | WEIGHT: 155.44 LBS | SYSTOLIC BLOOD PRESSURE: 108 MMHG | DIASTOLIC BLOOD PRESSURE: 68 MMHG

## 2017-12-12 DIAGNOSIS — E03.9 HYPOTHYROIDISM AFFECTING PREGNANCY IN THIRD TRIMESTER: ICD-10-CM

## 2017-12-12 DIAGNOSIS — Z3A.34 34 WEEKS GESTATION OF PREGNANCY: ICD-10-CM

## 2017-12-12 DIAGNOSIS — O09.291 PRIOR POOR OBSTETRICAL HISTORY IN FIRST TRIMESTER, ANTEPARTUM: ICD-10-CM

## 2017-12-12 DIAGNOSIS — O09.523 ELDERLY MULTIGRAVIDA IN THIRD TRIMESTER: Chronic | ICD-10-CM

## 2017-12-12 DIAGNOSIS — O09.90 SUPERVISION OF HIGH RISK PREGNANCY, ANTEPARTUM: Primary | ICD-10-CM

## 2017-12-12 DIAGNOSIS — O24.410 DIET CONTROLLED GESTATIONAL DIABETES MELLITUS (GDM) IN THIRD TRIMESTER: ICD-10-CM

## 2017-12-12 DIAGNOSIS — O99.283 HYPOTHYROIDISM AFFECTING PREGNANCY IN THIRD TRIMESTER: ICD-10-CM

## 2017-12-12 PROCEDURE — 0502F SUBSEQUENT PRENATAL CARE: CPT | Mod: S$GLB,,, | Performed by: NURSE PRACTITIONER

## 2017-12-12 PROCEDURE — 99999 PR PBB SHADOW E&M-EST. PATIENT-LVL II: CPT | Mod: PBBFAC,,, | Performed by: NURSE PRACTITIONER

## 2017-12-12 NOTE — PROGRESS NOTES
Continues with irreg BH ctxns, no lof or vb; PTL/FMC/PIH precautions; FBS all < 90, had one 2 HR PP BS of 160 after eating extra rice and sauce with brown sugar, but on average all 2 HR PP BS are < 120; F/U 2 weeks for GBS, Labs and visit.

## 2017-12-12 NOTE — TELEPHONE ENCOUNTER
----- Message from Martita Field sent at 12/11/2017 11:04 AM CST -----  Contact: self [phone]  Patient requesting a call back from the office to discuss getting a yearly form filled out . Please call and advise , Thanks

## 2017-12-15 ENCOUNTER — OFFICE VISIT (OUTPATIENT)
Dept: MATERNAL FETAL MEDICINE | Facility: CLINIC | Age: 37
End: 2017-12-15
Attending: OBSTETRICS & GYNECOLOGY
Payer: COMMERCIAL

## 2017-12-15 VITALS — DIASTOLIC BLOOD PRESSURE: 70 MMHG | SYSTOLIC BLOOD PRESSURE: 114 MMHG | WEIGHT: 152.56 LBS | BODY MASS INDEX: 27.9 KG/M2

## 2017-12-15 DIAGNOSIS — O24.410 DIET CONTROLLED GESTATIONAL DIABETES MELLITUS (GDM) IN THIRD TRIMESTER: ICD-10-CM

## 2017-12-15 DIAGNOSIS — Z36.9 ANTENATAL SCREENING ENCOUNTER: Primary | ICD-10-CM

## 2017-12-15 DIAGNOSIS — Z36.4 ANTENATAL SCREENING FOR FETAL GROWTH RETARDATION USING ULTRASONICS: ICD-10-CM

## 2017-12-15 DIAGNOSIS — E03.9 HYPOTHYROIDISM AFFECTING PREGNANCY IN THIRD TRIMESTER: ICD-10-CM

## 2017-12-15 DIAGNOSIS — O99.283 HYPOTHYROIDISM AFFECTING PREGNANCY IN THIRD TRIMESTER: ICD-10-CM

## 2017-12-15 DIAGNOSIS — O09.523 ELDERLY MULTIGRAVIDA IN THIRD TRIMESTER: Chronic | ICD-10-CM

## 2017-12-15 DIAGNOSIS — Z36.89 ENCOUNTER FOR ULTRASOUND TO CHECK FETAL GROWTH: ICD-10-CM

## 2017-12-15 PROCEDURE — 76816 OB US FOLLOW-UP PER FETUS: CPT | Mod: S$GLB,,, | Performed by: OBSTETRICS & GYNECOLOGY

## 2017-12-15 PROCEDURE — 99999 PR PBB SHADOW E&M-EST. PATIENT-LVL III: CPT | Mod: PBBFAC,,, | Performed by: OBSTETRICS & GYNECOLOGY

## 2017-12-15 PROCEDURE — 99499 UNLISTED E&M SERVICE: CPT | Mod: S$GLB,,, | Performed by: OBSTETRICS & GYNECOLOGY

## 2017-12-15 NOTE — PROGRESS NOTES
"Indication  ========    GDM-diet, AMA, hypothyroidism: ultrasound for fetal growth, MVP, and fetal well being (Dr. Aadms).    History  ======    General History  Other: GDM - diet  AMA: declined genetic screening or invasive procedure  twin loss at 17 weeks, bleeding during pregnancy  hypothyroid on synthroid  Previous Outcomes   4  Para 1  Ybarra children born living (T) 1  Ybarra children born (T) 1  Abortions (A) 2  Ybarra living children (L) 1  Miscarriages 1  Ectopic 1    Maternal Assessment  =================    Weight 69 kg  Weight (lb) 152 lb  BP syst 114 mmHg  BP diast 70 mmHg    Method  ======    Transabdominal ultrasound examination. View: Good view.    Pregnancy  =========    Ybarra pregnancy. Number of fetuses: 1.    Dating  ======    LMP on: 4/15/2017  Cycle: regular cycle  GA by LMP 34 w + 6 d  SHAYLEE by LMP: 2018  "Stated Dating" on: 2017  Type of external assessment: CRL  U/S measurement at external assessment date 5.7 mm  GA at "stated dating" date 6 w + 2 d  GA by "stated dating" 34 w + 5 d  SHAYLEE by "stated dating": 2018  Ultrasound examination on: 12/15/2017  GA by U/S based upon: AC, BPD, Femur, HC  GA by U/S 37 w + 0 d  SHAYLEE by U/S: 2018  Assigned: Dating performed on 09/15/2017, based on the external assessment (on 2017)  Assigned GA 34 w + 5 d  Assigned SHAYLEE: 2018    General Evaluation  ==============    Cardiac activity: present.  bpm.  Fetal movements: visualized.  Presentation: breech.  Placenta:  Placental site: anterior, fundal.  Umbilical cord: Cord vessels: 3 vessel cord.  Amniotic fluid: MVP 6.4 cm. AVINASH 9.4 cm. Q1 6.4 cm, Q2 0.0 cm, Q3 0.0 cm, Q4 3.0 cm.    Biophysical Profile  ==============    2: Fetal breathing movements  2: Gross body movements  2: Fetal tone  2: Amniotic fluid volume  8/8: Biophysical profile score    Fetal Biometry  ============    Fetal Biometry  BPD 92.9 mm 37w 5d Hadlock  .2 mm  .3 mm 38w 3d " Hadlock  .5 mm 35w 5d Hadlock  Femur 70.7 mm 36w 2d Hadlock  EFW 2,925 g 59% Vernon  Calculated by: Hadlock (BPD-HC-AC-FL)  EFW (lb) 6 lb  EFW (oz) 7 oz  Cephalic index 0.79  HC / AC 1.05  FL / BPD 0.76  FL / AC 0.22  MVP 6.4 cm  AVINASH 9.4 cm   bpm    Fetal Anatomy  ============    Cranium: normal  Stomach: normal  Kidneys: normal  Bladder: normal  Gender: male  Wants to know gender: yes  Other: A full anatomic survey has been previously performed.    Impression  =========    1. 34w 5d zaman intrauterine pregnancy  2. Interval fetal growth wnl (EFW = 2925 gms at 59%)  3. Breech presentation  4. BPP without NST =   5. Amniotic fluid volume wnl (AVINASH = 9.4cm and MVP 6.4cm)    Patient and her  were counseled on sono evaluation and recommendations for follow up. I counseled on breech presentation and  management options including external version vs.  section. Risks and benefits of both explained. Patient reports she already  discussed with Dr. Adams and desires  section if fetus remains breech.    Recommendation  ==============    1. We will schedule patient for a BPP, fetal presentation, MVP in PNT in 2 wks  2. We will schedule patient for a f/u sono with MFM in 4 wks for EFW, MVP, fetal presentation  3. GDM - diet controlled and hypothyroidism managed by Endocrine  4. fetal movement  .

## 2017-12-15 NOTE — LETTER
December 15, 2017      Мария Adams MD  [address]  [address]  [facility] [address]           [facility]  [address]  [facility] [address]  Phone: [phone]          Patient: Evelia Mishra   MR Number: [id number]   YOB: 1980   Date of Visit: 12/15/2017       Dear Dr. Мария Adams:    Thank you for referring Evelia Mishra to me for evaluation. Attached you will find relevant portions of my assessment and plan of care.    If you have questions, please do not hesitate to call me. I look forward to following Evelia Mishra along with you.    Sincerely,    Yadira Smith MD    Enclosure  CC:  No Recipients    If you would like to receive this communication electronically, please contact [email or web address] or [phone] to request more information on [facility] access.    For providers and/or their staff who would like to refer a patient to [facility], please contact us through our one-stop-shop provider referral line, [facility], at [phone].    If you feel you have received this communication in error or would no longer like to receive these types of communications, please e-mail [email or web address]

## 2017-12-18 ENCOUNTER — TELEPHONE (OUTPATIENT)
Dept: INTERNAL MEDICINE | Facility: CLINIC | Age: 37
End: 2017-12-18

## 2017-12-18 NOTE — TELEPHONE ENCOUNTER
Pt called in regards to her  form being filled out he has a busy schedule she want to know if he can have labs done at main campus

## 2017-12-18 NOTE — TELEPHONE ENCOUNTER
----- Message from Martita Field sent at 12/18/2017 10:16 AM CST -----  Contact: self [phone]  Patient returning a call from the office in regards to getting a wellness form filled out and sent to [facility] employee heath . Please advise , Thanks

## 2017-12-21 RX ORDER — LEVOTHYROXINE SODIUM 75 UG/1
TABLET ORAL
Qty: 30 TABLET | Refills: 2 | Status: SHIPPED | OUTPATIENT
Start: 2017-12-21 | End: 2018-02-14 | Stop reason: SDUPTHER

## 2017-12-26 ENCOUNTER — ROUTINE PRENATAL (OUTPATIENT)
Dept: OBSTETRICS AND GYNECOLOGY | Facility: CLINIC | Age: 37
End: 2017-12-26
Payer: COMMERCIAL

## 2017-12-26 VITALS
BODY MASS INDEX: 28.23 KG/M2 | WEIGHT: 154.31 LBS | SYSTOLIC BLOOD PRESSURE: 110 MMHG | DIASTOLIC BLOOD PRESSURE: 76 MMHG

## 2017-12-26 DIAGNOSIS — Z3A.36 PREGNANCY WITH 36 COMPLETED WEEKS GESTATION: Primary | ICD-10-CM

## 2017-12-26 PROCEDURE — 0502F SUBSEQUENT PRENATAL CARE: CPT | Mod: S$GLB,,, | Performed by: OBSTETRICS & GYNECOLOGY

## 2017-12-26 PROCEDURE — 87184 SC STD DISK METHOD PER PLATE: CPT

## 2017-12-26 PROCEDURE — 87081 CULTURE SCREEN ONLY: CPT

## 2017-12-26 PROCEDURE — 99999 PR PBB SHADOW E&M-EST. PATIENT-LVL II: CPT | Mod: PBBFAC,,, | Performed by: OBSTETRICS & GYNECOLOGY

## 2017-12-26 PROCEDURE — 87147 CULTURE TYPE IMMUNOLOGIC: CPT

## 2017-12-26 NOTE — PROGRESS NOTES
GOOD FM, NO UC AND NO PV LOSS.  FSBS HAVE BEEN IN RANGE.  GBS SUBMITTED AND REF LABS.  DISCUSSED LABOR INSTRUCTIONS AND WARNING SIGNS.  IF REMAINS BREECH WOULD LIKE TO SCHEDULE  RATHER THAN ATTEMPTING VERSION.  CLOSED/ 20%/ PP FLOATING - STILL BREECH.  F/U WEEKLY

## 2017-12-29 ENCOUNTER — HOSPITAL ENCOUNTER (OUTPATIENT)
Dept: PERINATAL CARE | Facility: OTHER | Age: 37
Discharge: HOME | End: 2017-12-29
Attending: OBSTETRICS & GYNECOLOGY
Payer: COMMERCIAL

## 2017-12-29 DIAGNOSIS — O24.419 GESTATIONAL DIABETES MELLITUS (GDM) IN THIRD TRIMESTER, GESTATIONAL DIABETES METHOD OF CONTROL UNSPECIFIED: ICD-10-CM

## 2017-12-29 DIAGNOSIS — Z36.9 ANTENATAL SCREENING ENCOUNTER: ICD-10-CM

## 2017-12-29 LAB — BACTERIA SPEC AEROBE CULT: NORMAL

## 2017-12-29 PROCEDURE — 76819 FETAL BIOPHYS PROFIL W/O NST: CPT

## 2017-12-29 PROCEDURE — 76819 FETAL BIOPHYS PROFIL W/O NST: CPT | Mod: 26,,, | Performed by: OBSTETRICS & GYNECOLOGY

## 2018-01-03 ENCOUNTER — ROUTINE PRENATAL (OUTPATIENT)
Dept: OBSTETRICS AND GYNECOLOGY | Facility: CLINIC | Age: 38
End: 2018-01-03
Payer: COMMERCIAL

## 2018-01-03 VITALS
BODY MASS INDEX: 28.83 KG/M2 | WEIGHT: 157.63 LBS | DIASTOLIC BLOOD PRESSURE: 70 MMHG | SYSTOLIC BLOOD PRESSURE: 100 MMHG

## 2018-01-03 DIAGNOSIS — Z3A.37 PREGNANCY WITH 37 WEEKS COMPLETED GESTATION: Primary | ICD-10-CM

## 2018-01-03 PROCEDURE — 0502F SUBSEQUENT PRENATAL CARE: CPT | Mod: S$GLB,,, | Performed by: OBSTETRICS & GYNECOLOGY

## 2018-01-03 PROCEDURE — 99999 PR PBB SHADOW E&M-EST. PATIENT-LVL II: CPT | Mod: PBBFAC,,, | Performed by: OBSTETRICS & GYNECOLOGY

## 2018-01-03 NOTE — PROGRESS NOTES
GOOD FM, SOME STRONG UC AND NO PV LOSS.  PAIN WITH SOME BMA ND CONSTIPATION, SORE WRISTS PROB ASSOCIATED WITH EDEMA, SOME OTHER SOMATIC C/O.  HAS ONE MORE USG SET WITH HEMAL Monday, BUT CAN FEEL SUTURES TODAY

## 2018-01-08 ENCOUNTER — OFFICE VISIT (OUTPATIENT)
Dept: MATERNAL FETAL MEDICINE | Facility: CLINIC | Age: 38
End: 2018-01-08
Attending: OBSTETRICS & GYNECOLOGY
Payer: COMMERCIAL

## 2018-01-08 ENCOUNTER — ANESTHESIA (OUTPATIENT)
Dept: OBSTETRICS AND GYNECOLOGY | Facility: OTHER | Age: 38
End: 2018-01-08
Payer: COMMERCIAL

## 2018-01-08 ENCOUNTER — ANESTHESIA EVENT (OUTPATIENT)
Dept: OBSTETRICS AND GYNECOLOGY | Facility: OTHER | Age: 38
End: 2018-01-08
Payer: COMMERCIAL

## 2018-01-08 ENCOUNTER — HOSPITAL ENCOUNTER (INPATIENT)
Facility: OTHER | Age: 38
LOS: 2 days | Discharge: HOME | End: 2018-01-10
Attending: OBSTETRICS & GYNECOLOGY | Admitting: OBSTETRICS & GYNECOLOGY
Payer: COMMERCIAL

## 2018-01-08 VITALS — WEIGHT: 158.5 LBS | BODY MASS INDEX: 28.99 KG/M2 | SYSTOLIC BLOOD PRESSURE: 118 MMHG | DIASTOLIC BLOOD PRESSURE: 86 MMHG

## 2018-01-08 DIAGNOSIS — O24.410 DIET CONTROLLED GESTATIONAL DIABETES MELLITUS (GDM) IN THIRD TRIMESTER: Primary | ICD-10-CM

## 2018-01-08 DIAGNOSIS — O09.293 PRIOR POOR OBSTETRICAL HISTORY IN THIRD TRIMESTER, ANTEPARTUM: ICD-10-CM

## 2018-01-08 DIAGNOSIS — O09.523 ELDERLY MULTIGRAVIDA IN THIRD TRIMESTER: Chronic | ICD-10-CM

## 2018-01-08 DIAGNOSIS — O41.03X0 OLIGOHYDRAMNIOS IN THIRD TRIMESTER, SINGLE OR UNSPECIFIED FETUS: ICD-10-CM

## 2018-01-08 DIAGNOSIS — O99.820 GBS (GROUP B STREPTOCOCCUS CARRIER), +RV CULTURE, CURRENTLY PREGNANT: ICD-10-CM

## 2018-01-08 DIAGNOSIS — Z36.89 ENCOUNTER FOR ULTRASOUND TO CHECK FETAL GROWTH: Primary | ICD-10-CM

## 2018-01-08 DIAGNOSIS — O41.00X0 OLIGOHYDRAMNIOS: ICD-10-CM

## 2018-01-08 DIAGNOSIS — O24.410 DIET CONTROLLED GESTATIONAL DIABETES MELLITUS (GDM) IN THIRD TRIMESTER: ICD-10-CM

## 2018-01-08 LAB
ABO + RH BLD: NORMAL
BASOPHILS # BLD AUTO: 0.03 K/UL
BASOPHILS NFR BLD: 0.5 %
BLD GP AB SCN CELLS X3 SERPL QL: NORMAL
DIFFERENTIAL METHOD: ABNORMAL
EOSINOPHIL # BLD AUTO: 0.1 K/UL
EOSINOPHIL NFR BLD: 1.4 %
ERYTHROCYTE [DISTWIDTH] IN BLOOD BY AUTOMATED COUNT: 14.2 %
HCT VFR BLD AUTO: 35 %
HGB BLD-MCNC: 11.8 G/DL
LYMPHOCYTES # BLD AUTO: 1.8 K/UL
LYMPHOCYTES NFR BLD: 28.6 %
MCH RBC QN AUTO: 30.3 PG
MCHC RBC AUTO-ENTMCNC: 33.7 G/DL
MCV RBC AUTO: 90 FL
MONOCYTES # BLD AUTO: 0.5 K/UL
MONOCYTES NFR BLD: 8 %
NEUTROPHILS # BLD AUTO: 3.9 K/UL
NEUTROPHILS NFR BLD: 60.6 %
PLATELET # BLD AUTO: 179 K/UL
PMV BLD AUTO: 10.5 FL
POCT GLUCOSE: 87 MG/DL (ref 70–110)
RBC # BLD AUTO: 3.9 M/UL
WBC # BLD AUTO: 6.37 K/UL

## 2018-01-08 PROCEDURE — 76819 FETAL BIOPHYS PROFIL W/O NST: CPT | Mod: S$GLB,,, | Performed by: OBSTETRICS & GYNECOLOGY

## 2018-01-08 PROCEDURE — 63600175 PHARM REV CODE 636 W HCPCS: Performed by: STUDENT IN AN ORGANIZED HEALTH CARE EDUCATION/TRAINING PROGRAM

## 2018-01-08 PROCEDURE — 99999 PR PBB SHADOW E&M-EST. PATIENT-LVL II: CPT | Mod: PBBFAC,,, | Performed by: OBSTETRICS & GYNECOLOGY

## 2018-01-08 PROCEDURE — 25000003 PHARM REV CODE 250: Performed by: ANESTHESIOLOGY

## 2018-01-08 PROCEDURE — 59409 OBSTETRICAL CARE: CPT | Mod: QY,,, | Performed by: ANESTHESIOLOGY

## 2018-01-08 PROCEDURE — 25000003 PHARM REV CODE 250: Performed by: STUDENT IN AN ORGANIZED HEALTH CARE EDUCATION/TRAINING PROGRAM

## 2018-01-08 PROCEDURE — 27200710 HC EPIDURAL INFUSION PUMP SET: Performed by: ANESTHESIOLOGY

## 2018-01-08 PROCEDURE — 99499 UNLISTED E&M SERVICE: CPT | Mod: S$GLB,,, | Performed by: OBSTETRICS & GYNECOLOGY

## 2018-01-08 PROCEDURE — 10907ZC DRAINAGE OF AMNIOTIC FLUID, THERAPEUTIC FROM PRODUCTS OF CONCEPTION, VIA NATURAL OR ARTIFICIAL OPENING: ICD-10-PCS | Performed by: OBSTETRICS & GYNECOLOGY

## 2018-01-08 PROCEDURE — 86901 BLOOD TYPING SEROLOGIC RH(D): CPT

## 2018-01-08 PROCEDURE — 63600175 PHARM REV CODE 636 W HCPCS: Performed by: ANESTHESIOLOGY

## 2018-01-08 PROCEDURE — 36415 COLL VENOUS BLD VENIPUNCTURE: CPT

## 2018-01-08 PROCEDURE — 62326 NJX INTERLAMINAR LMBR/SAC: CPT | Performed by: ANESTHESIOLOGY

## 2018-01-08 PROCEDURE — 0KQM0ZZ REPAIR PERINEUM MUSCLE, OPEN APPROACH: ICD-10-PCS | Performed by: OBSTETRICS & GYNECOLOGY

## 2018-01-08 PROCEDURE — 76816 OB US FOLLOW-UP PER FETUS: CPT | Mod: S$GLB,,, | Performed by: OBSTETRICS & GYNECOLOGY

## 2018-01-08 PROCEDURE — 11000001 HC ACUTE MED/SURG PRIVATE ROOM

## 2018-01-08 PROCEDURE — 27800517 HC TRAY,EPIDURAL-CONTINUOUS: Performed by: ANESTHESIOLOGY

## 2018-01-08 PROCEDURE — 85025 COMPLETE CBC W/AUTO DIFF WBC: CPT

## 2018-01-08 RX ORDER — FENTANYL CITRATE 50 UG/ML
INJECTION, SOLUTION INTRAMUSCULAR; INTRAVENOUS
Status: DISCONTINUED | OUTPATIENT
Start: 2018-01-08 | End: 2018-01-08

## 2018-01-08 RX ORDER — METHYLERGONOVINE MALEATE 0.2 MG/ML
INJECTION INTRAVENOUS
Status: DISCONTINUED
Start: 2018-01-08 | End: 2018-01-09 | Stop reason: WASHOUT

## 2018-01-08 RX ORDER — CARBOPROST TROMETHAMINE 250 UG/ML
INJECTION, SOLUTION INTRAMUSCULAR
Status: DISCONTINUED
Start: 2018-01-08 | End: 2018-01-09 | Stop reason: WASHOUT

## 2018-01-08 RX ORDER — FENTANYL/BUPIVACAINE/NS/PF 2MCG/ML-.1
PLASTIC BAG, INJECTION (ML) INJECTION
Status: DISPENSED
Start: 2018-01-08 | End: 2018-01-09

## 2018-01-08 RX ORDER — OXYTOCIN/RINGER'S LACTATE 20/1000 ML
2 PLASTIC BAG, INJECTION (ML) INTRAVENOUS CONTINUOUS
Status: DISCONTINUED | OUTPATIENT
Start: 2018-01-08 | End: 2018-01-09

## 2018-01-08 RX ORDER — FENTANYL CITRATE 50 UG/ML
INJECTION, SOLUTION INTRAMUSCULAR; INTRAVENOUS
Status: COMPLETED
Start: 2018-01-08 | End: 2018-01-08

## 2018-01-08 RX ORDER — BUPIVACAINE HYDROCHLORIDE 2.5 MG/ML
INJECTION, SOLUTION EPIDURAL; INFILTRATION; INTRACAUDAL
Status: DISPENSED
Start: 2018-01-08 | End: 2018-01-09

## 2018-01-08 RX ORDER — CHLOROPROCAINE HYDROCHLORIDE 30 MG/ML
INJECTION, SOLUTION EPIDURAL; INFILTRATION; INTRACAUDAL; PERINEURAL
Status: DISPENSED
Start: 2018-01-08 | End: 2018-01-09

## 2018-01-08 RX ORDER — MISOPROSTOL 200 UG/1
TABLET ORAL
Status: DISCONTINUED
Start: 2018-01-08 | End: 2018-01-09 | Stop reason: WASHOUT

## 2018-01-08 RX ORDER — ONDANSETRON 8 MG/1
8 TABLET, ORALLY DISINTEGRATING ORAL EVERY 8 HOURS PRN
Status: DISCONTINUED | OUTPATIENT
Start: 2018-01-08 | End: 2018-01-09

## 2018-01-08 RX ORDER — ONDANSETRON 2 MG/ML
4 INJECTION INTRAMUSCULAR; INTRAVENOUS ONCE
Status: COMPLETED | OUTPATIENT
Start: 2018-01-08 | End: 2018-01-08

## 2018-01-08 RX ORDER — FENTANYL/BUPIVACAINE/NS/PF 2MCG/ML-.1
PLASTIC BAG, INJECTION (ML) INJECTION CONTINUOUS PRN
Status: DISCONTINUED | OUTPATIENT
Start: 2018-01-08 | End: 2018-01-08

## 2018-01-08 RX ORDER — LIDOCAINE HYDROCHLORIDE AND EPINEPHRINE 15; 5 MG/ML; UG/ML
INJECTION, SOLUTION EPIDURAL
Status: DISCONTINUED | OUTPATIENT
Start: 2018-01-08 | End: 2018-01-08

## 2018-01-08 RX ORDER — SODIUM CHLORIDE, SODIUM LACTATE, POTASSIUM CHLORIDE, CALCIUM CHLORIDE 600; 310; 30; 20 MG/100ML; MG/100ML; MG/100ML; MG/100ML
INJECTION, SOLUTION INTRAVENOUS CONTINUOUS
Status: DISCONTINUED | OUTPATIENT
Start: 2018-01-08 | End: 2018-01-09

## 2018-01-08 RX ORDER — SODIUM CHLORIDE 9 MG/ML
INJECTION, SOLUTION INTRAVENOUS
Status: DISCONTINUED | OUTPATIENT
Start: 2018-01-08 | End: 2018-01-09

## 2018-01-08 RX ADMIN — FENTANYL CITRATE 100 MCG: 50 INJECTION, SOLUTION INTRAMUSCULAR; INTRAVENOUS at 12:01

## 2018-01-08 RX ADMIN — ONDANSETRON HYDROCHLORIDE 4 MG: 2 INJECTION, SOLUTION INTRAMUSCULAR; INTRAVENOUS at 09:01

## 2018-01-08 RX ADMIN — Medication 2 MILLI-UNITS/MIN: at 11:01

## 2018-01-08 RX ADMIN — Medication 5 ML: at 12:01

## 2018-01-08 RX ADMIN — Medication 10 ML/HR: at 12:01

## 2018-01-08 RX ADMIN — DEXTROSE 2.5 MILLION UNITS: 50 INJECTION, SOLUTION INTRAVENOUS at 10:01

## 2018-01-08 RX ADMIN — SODIUM CHLORIDE, SODIUM LACTATE, POTASSIUM CHLORIDE, AND CALCIUM CHLORIDE: 600; 310; 30; 20 INJECTION, SOLUTION INTRAVENOUS at 10:01

## 2018-01-08 RX ADMIN — LIDOCAINE HYDROCHLORIDE,EPINEPHRINE BITARTRATE 3 ML: 15; .005 INJECTION, SOLUTION EPIDURAL; INFILTRATION; INTRACAUDAL; PERINEURAL at 12:01

## 2018-01-08 RX ADMIN — PENICILLIN G POTASSIUM 5 MILLION UNITS: 5000000 POWDER, FOR SOLUTION INTRAMUSCULAR; INTRAPLEURAL; INTRATHECAL; INTRAVENOUS at 10:01

## 2018-01-08 RX ADMIN — FENTANYL CITRATE 100 MCG: 50 INJECTION, SOLUTION INTRAMUSCULAR; INTRAVENOUS at 07:01

## 2018-01-08 RX ADMIN — DEXTROSE 2.5 MILLION UNITS: 50 INJECTION, SOLUTION INTRAVENOUS at 03:01

## 2018-01-08 RX ADMIN — DEXTROSE 2.5 MILLION UNITS: 50 INJECTION, SOLUTION INTRAVENOUS at 06:01

## 2018-01-08 NOTE — PROGRESS NOTES
"LABOR NOTE    37 y.o. [id number] at 38w1d presents for IOL for oligo.    S:  Complaints: No.  Epidural working:  yes      O: /77   Pulse 76   Temp 98.1 °F (36.7 °C)   Resp 18   Ht 5' 2" (1.575 m)   Wt 71.2 kg (157 lb)   LMP 04/15/2017   SpO2 100%   Breastfeeding? No   BMI 28.72 kg/m²       FHT: 135, mod btbv, + accels, occ early decels.  Cat 2 (reassuring)  CTX: q 2-4 minutes  SVE: /-2      ASSESSMENT:   37 y.o. [id number] at 38w1d presents for IOL for oligo.    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        Resolved Hospital Problems    Diagnosis Date Resolved POA   No resolved problems to display.         PLAN:    Continue Close Maternal/Fetal Monitoring  Pitocin Augmentation per protocol, now @ 18  Recheck 2 hours or PRN    Sushma K. Reddy  PGY-1, OBGYN  "

## 2018-01-08 NOTE — H&P
[facility]  Obstetrics  History & Physical    Patient Name: Evelia Law  MRN: [id number]  Admission Date: 2018  Primary Care Provider: Gely Garcia MD    Subjective:     Principal Problem:Oligohydramnios    History of Present Illness:  Evelia Law is a 37 y.o. [id number] at 38w1d presents for IOL following findings of oligohydramnios in prenatal testing this morning. Patient denies contractions, denies vaginal bleeding, denies LOF.   Fetal Movement: normal.   This IUP is complicated by diet-controlled GDM, hypothyroidism, AMA, and GBS+.      Obstetric History       T1      L1     SAB0   TAB0   Ectopic0   Multiple1   Live Births1       # Outcome Date GA Lbr Faisal/2nd Weight Sex Delivery Anes PTL Lv   4 Current            3A Para 16 17w2d  0.075 kg (2.7 oz) U Vag-Spont None Y FD      Name: BRANDO LAW UNKNOWN CHARIT FD      Apgar1:  0                Apgar5: 0   3B Para 16 17w3d  0.08 kg (2.8 oz) U Vag-Spont None Y FD      Name: MELISSA LAW UNKNOWN CHARIT FD      Apgar1:  0                Apgar5: 0   2 Term 06 40w0d   F Vag-Spont   JASIEL   1 AB      ECTOPIC   FD        Past Medical History:   Diagnosis Date    Hypothyroidism      History reviewed. No pertinent surgical history.    PTA Medications   Medication Sig    blood sugar diagnostic Strp 1 strip by Misc.(Non-Drug; Combo Route) route 5 (five) times daily.    blood-glucose meter kit Use as instructed    cholecalciferol, vitamin D3, (VITAMIN D3) 2,000 unit Cap Take 1 capsule by mouth once daily.    lancets Misc 1 each by Misc.(Non-Drug; Combo Route) route 5 (five) times daily.    levothyroxine (SYNTHROID) 75 MCG tablet TAKE ONE TABLET (75 MCG TOTAL) BY MOUTH ONCE DAILY    PRENATAL VIT #76/IRON,CARB/FA (PNV 29-1 ORAL) Take 1 tablet by mouth once daily.       Review of patient's allergies indicates:  No Known Allergies     Family History     Problem Relation (Age of Onset)    Cataracts Maternal Grandmother     Diabetes Maternal Grandmother, Paternal Grandmother, Maternal Uncle, Paternal Uncle    Hyperlipidemia Father    Hypothyroidism Paternal Cousin        Social History Main Topics    Smoking status: Never Smoker    Smokeless tobacco: Never Used    Alcohol use No    Drug use: No    Sexual activity: Not Currently     Partners: Male     Birth control/ protection: None     Review of Systems   Constitutional: Negative for chills and fever.   Respiratory: Negative for shortness of breath.    Cardiovascular: Negative for chest pain.   Gastrointestinal: Negative for abdominal pain, nausea and vomiting.   Genitourinary: Negative for vaginal bleeding and vaginal discharge.   Neurological: Negative for headaches.      Objective:     Vital Signs (Most Recent):  Temp: 98.1 °F (36.7 °C) (01/08/18 0949)  Pulse: 94 (01/08/18 0949)  Resp: 18 (01/08/18 0949)  SpO2: 100 % (01/08/18 0949) Vital Signs (24h Range):  Temp:  [98.1 °F (36.7 °C)] 98.1 °F (36.7 °C)  Pulse:  [94] 94  Resp:  [18] 18  SpO2:  [100 %] 100 %  BP: (118)/(86) 118/86     Weight: 71.2 kg (157 lb)  Body mass index is 28.72 kg/m².    FHT: 140, + accels, - decels, Cat 1 (reassuring)  TOCO: Not present    Physical Exam:   Constitutional: She is oriented to person, place, and time. She appears well-developed and well-nourished.    HENT:   Head: Normocephalic and atraumatic.    Eyes: EOM are normal.    Neck: Normal range of motion.    Cardiovascular: Normal rate.     Pulmonary/Chest: Effort normal. No respiratory distress.        Abdominal: Soft. There is no tenderness.   Gravid                  Neurological: She is alert and oriented to person, place, and time.    Skin: Skin is warm and dry.    Psychiatric: She has a normal mood and affect. Her behavior is normal. Judgment and thought content normal.       Cervix:  Dilation:  2  Effacement:  70  Station: -2  Presentation: Vertex     Significant Labs:  Lab Results   Component Value Date    [facility] POS 06/27/2017     HEPBSAG Negative 2017    STREPBCULT  2017     STREPTOCOCCUS AGALACTIAE (GROUP B)  Beta-hemolytic streptococci are routinely susceptible to   penicillins,cephalosporins and carbapenems.         I have personallly reviewed all pertinent lab results from the last 24 hours.    Assessment/Plan:     37 y.o. female  at 38w1d for:    * Oligohydramnios    - admit to L&D for IOL  - Consents signed and to chart  - Admit to Labor and Delivery unit  - Plan for induction is pitocin   - Epidural per Anesthesia  - Draw CBC, T&S  - Notify Staff  - Ultrasound performed, fetus in vertex position.   - Recheck in 2 hrs or PRN  - Post-Partum Hemorrhage risk - low        Gestational diabetes mellitus (GDM) in third trimester    - diet-controlled  - postpartum fasting glucose        GBS (group B Streptococcus carrier), +RV culture, currently pregnant    - intrapartum PCN ordered            Sushma K Reddy, MD  Obstetrics  [facility]

## 2018-01-08 NOTE — ANESTHESIA PREPROCEDURE EVALUATION
2018  Evelia Mishra is a 37 y.o., female  at 38 1/7 weeks who presents from clinic with oligohydramnios and plans for IOL. She has hx of IUFD, GDM and hypothyroidism.    OB History      Para Term  AB Living    4 2 1   1 1    SAB TAB Ectopic Multiple Live Births        1 1 1              Patient Active Problem List   Diagnosis    Positive PPD    Fibroadenoma of breast    Pelvic muscle wasting    GBS (group B Streptococcus carrier), +RV culture, currently pregnant    AMA (advanced maternal age) multigravida 35+    Prior poor obstetrical history in first trimester, antepartum    Hypothyroidism affecting pregnancy    Gestational diabetes mellitus (GDM) in third trimester     screening encounter    Oligohydramnios       Review of patient's allergies indicates:  No Known Allergies     No current facility-administered medications on file prior to encounter.      Current Outpatient Prescriptions on File Prior to Encounter   Medication Sig Dispense Refill    blood sugar diagnostic Strp 1 strip by Misc.(Non-Drug; Combo Route) route 5 (five) times daily. 200 strip 6    blood-glucose meter kit Use as instructed 1 each 1    cholecalciferol, vitamin D3, (VITAMIN D3) 2,000 unit Cap Take 1 capsule by mouth once daily.      lancets Misc 1 each by Misc.(Non-Drug; Combo Route) route 5 (five) times daily. 200 each 3    levothyroxine (SYNTHROID) 75 MCG tablet TAKE ONE TABLET (75 MCG TOTAL) BY MOUTH ONCE DAILY 30 tablet 2    PRENATAL VIT #76/IRON,CARB/FA (PNV 29-1 ORAL) Take 1 tablet by mouth once daily.         No past surgical history on file.    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 No    Breast-Feeding No     Social History Narrative    No narrative on file         Vital Signs Range (Last 24H):  Temp:  [36.7 °C (98.1 °F)]   Pulse:  [94]   Resp:  [18]   BP: (118)/(86)   SpO2:  [100 %]       CBC: No results for input(s): WBC, RBC, HGB, HCT, PLT, MCV, MCH, MCHC in the last 72 hours.    CMP: No results for input(s): NA, K, CL, CO2, BUN, CREATININE, GLU, MG, PHOS, CALCIUM, ALBUMIN, PROT, ALKPHOS, ALT, AST, BILITOT in the last 72 hours.    INR  No results for input(s): PT, INR, PROTIME, APTT in the last 72 hours.        Anesthesia Evaluation    I have reviewed the Patient Summary Reports.    I have reviewed the Nursing Notes.   I have reviewed the Medications.     Review of Systems  Anesthesia Hx:  No problems with previous Anesthesia  History of prior surgery of interest to airway management or planning: Denies Family Hx of Anesthesia complications.   Denies Personal Hx of Anesthesia complications.   Social:  Non-Smoker, No Alcohol Use    Hematology/Oncology:  Hematology Normal   Oncology Normal     EENT/Dental:EENT/Dental Normal   Cardiovascular:  Cardiovascular Normal     Pulmonary:  Pulmonary Normal    Renal/:  Renal/ Normal     Hepatic/GI:  Hepatic/GI Normal    Musculoskeletal:  Musculoskeletal Normal    Neurological:  Neurology Normal    Endocrine:   Diabetes, gestational Hypothyroidism    Dermatological:  Skin Normal    Psych:  Psychiatric Normal           Physical Exam  General:  Well nourished    Airway/Jaw/Neck:  Airway Findings: Mouth Opening: Normal Tongue: Normal  General Airway Assessment: Adult  Mallampati: III  Improves to II with phonation.  TM Distance: Normal, at least 6 cm      Dental:  Dental Findings: In tact    Chest/Lungs:  Chest/Lungs Findings: Clear to auscultation, Normal Respiratory Rate     Heart/Vascular:  Heart Findings: Rate: Normal  Rhythm: Regular Rhythm  Sounds: Normal        Mental Status:  Mental Status Findings:  Cooperative, Alert and Oriented         Anesthesia Plan  Type of Anesthesia, risks & benefits discussed:  Anesthesia Type:  CSE, epidural, general, spinal  Patient's Preference:   Intra-op Monitoring Plan:   Intra-op Monitoring Plan Comments:   Post Op Pain Control Plan:   Post Op Pain Control Plan Comments:   Induction:   IV  Beta Blocker:  Patient is not currently on a Beta-Blocker (No further documentation required).       Informed Consent: Patient understands risks and agrees with Anesthesia plan.  Questions answered. Anesthesia consent signed with patient.  ASA Score: 2     Day of Surgery Review of History & Physical:    H&P update referred to the surgeon.         Ready For Surgery From Anesthesia Perspective.

## 2018-01-08 NOTE — ANESTHESIA PROCEDURE NOTES
Epidural    Patient location during procedure: OB   Reason for block: primary anesthetic   Diagnosis: IUP   Start time: 1/8/2018 12:20 PM  Timeout: 1/8/2018 12:20 PM  End time: 1/8/2018 12:25 PM  Staffing  Anesthesiologist: LINA FIGUEROA  Resident/CRNA: MICHAELLE WEBSTER  Performed: resident/CRNA   Preanesthetic Checklist  Completed: patient identified, site marked, surgical consent, pre-op evaluation, timeout performed, IV checked, risks and benefits discussed, monitors and equipment checked, anesthesia consent given, hand hygiene performed and patient being monitored  Preparation  Patient position: sitting  Prep: ChloraPrep  Patient monitoring: ECG, Pulse Ox and Blood Pressure  Epidural  Skin Anesthetic: lidocaine 1%  Skin Wheal: 3 mL  Administration type: single shot  Approach: midline  Interspace: L3-4  Injection technique: ELLI saline  Needle and Epidural Catheter  Needle type: Tuohy   Needle gauge: 17  Needle length: 3.5 inches  Needle insertion depth: 6 cm  Catheter type: springwound and multi-orifice  Catheter size: 19 G  Catheter at skin depth: 9 cm  Test dose: 3 mL of lidocaine 1.5% with Epi 1-to-200,000  Additional Documentation: incremental injection, negative aspiration for heme and CSF, no paresthesia on injection, no significant pain on injection, no significant complaints from patient and no signs/symptoms of IV or SA injection  Needle localization: anatomical landmarks  Medications:  Bolus administered: 10 mL of 0.125% bupivacaine  Opioid administered: 100 mcg of   fentanyl  Volume per aspiration: 6 mL  Time between aspirations: 3 minutes  Assessment  Upper dermatomal levels - Left: T8  Right: T8   Dermatomal levels determined by ice  Ease of block: easy  Patient's tolerance of the procedure: comfortable throughout block  Additional Notes  L spine slight deviation to the left from midline.

## 2018-01-08 NOTE — PROGRESS NOTES
"LABOR NOTE    37 y.o. [id number] at 38w1d presents for IOL for oligo.    S:  Complaints: No.  Epidural working:  yes      O: /80   Pulse 79   Temp 98.1 °F (36.7 °C)   Resp 18   Ht 5' 2" (1.575 m)   Wt 71.2 kg (157 lb)   LMP 04/15/2017   SpO2 99%   Breastfeeding? No   BMI 28.72 kg/m²       FHT: 135, mod btbv, + accels, occ early decels.  Cat 1 (reassuring)  CTX: q 2-4 minutes  SVE: 3/70/-2      ASSESSMENT:   37 y.o. [id number] at 38w1d presents for IOL for oligo.    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        Resolved Hospital Problems    Diagnosis Date Resolved POA   No resolved problems to display.         PLAN:    Continue Close Maternal/Fetal Monitoring  Pitocin Augmentation per protocol, now @ 14  Recheck 2 hours or PRN    Sushma K. Reddy  PGY-1, OBGYN  "

## 2018-01-08 NOTE — HOSPITAL COURSE
2018 - admitted for IOL 2/2 oligo in PNT today. Underwent  with no complications.  2018 - doing well this morning, meeting postpartum goals.  01/10/2018 - PPD #2, doing well. Stable for discharge to home today. Meeting postpartum milestones.

## 2018-01-08 NOTE — LETTER
January 8, 2018      Мария Adams MD  [address]  [address]  [facility] [address]           [facility]  [address]  [facility] [address]  Phone: [phone]          Patient: Evelia Mishra   MR Number: [id number]   YOB: 1980   Date of Visit: 1/8/2018       Dear Dr. Мария Adams:    Thank you for referring Evelia Mishra to me for evaluation. Attached you will find relevant portions of my assessment and plan of care.    If you have questions, please do not hesitate to call me. I look forward to following Evelia Mishra along with you.    Sincerely,    Yadria Smith MD    Enclosure  CC:  No Recipients    If you would like to receive this communication electronically, please contact [email or web address] or [phone] to request more information on [employer] Link access.    For providers and/or their staff who would like to refer a patient to [facility], please contact us through our one-stop-shop provider referral line, [facility], at [phone].    If you feel you have received this communication in error or would no longer like to receive these types of communications, please e-mail [email or web address]

## 2018-01-08 NOTE — SUBJECTIVE & OBJECTIVE
Obstetric History       T1      L1     SAB0   TAB0   Ectopic0   Multiple1   Live Births1       # Outcome Date GA Lbr Faisal/2nd Weight Sex Delivery Anes PTL Lv   4 Current            3A Para 16 17w2d  0.075 kg (2.7 oz) U Vag-Spont None Y FD      Name: BRANDO LAW UNKNOWN CHARIT FD      Apgar1:  0                Apgar5: 0   3B Para 16 17w3d  0.08 kg (2.8 oz) U Vag-Spont None Y FD      Name: MELISSA LAW UNKNOWN CHARIT FD      Apgar1:  0                Apgar5: 0   2 Term 06 40w0d   F Vag-Spont   JASIEL   1 AB      ECTOPIC   FD        Past Medical History:   Diagnosis Date    Hypothyroidism      History reviewed. No pertinent surgical history.    PTA Medications   Medication Sig    blood sugar diagnostic Strp 1 strip by Misc.(Non-Drug; Combo Route) route 5 (five) times daily.    blood-glucose meter kit Use as instructed    cholecalciferol, vitamin D3, (VITAMIN D3) 2,000 unit Cap Take 1 capsule by mouth once daily.    lancets Misc 1 each by Misc.(Non-Drug; Combo Route) route 5 (five) times daily.    levothyroxine (SYNTHROID) 75 MCG tablet TAKE ONE TABLET (75 MCG TOTAL) BY MOUTH ONCE DAILY    PRENATAL VIT #76/IRON,CARB/FA (PNV 29-1 ORAL) Take 1 tablet by mouth once daily.       Review of patient's allergies indicates:  No Known Allergies     Family History     Problem Relation (Age of Onset)    Cataracts Maternal Grandmother    Diabetes Maternal Grandmother, Paternal Grandmother, Maternal Uncle, Paternal Uncle    Hyperlipidemia Father    Hypothyroidism Paternal Cousin        Social History Main Topics    Smoking status: Never Smoker    Smokeless tobacco: Never Used    Alcohol use No    Drug use: No    Sexual activity: Not Currently     Partners: Male     Birth control/ protection: None     Review of Systems   Constitutional: Negative for chills and fever.   Respiratory: Negative for shortness of breath.    Cardiovascular: Negative for chest pain.   Gastrointestinal: Negative for abdominal  pain, nausea and vomiting.   Genitourinary: Negative for vaginal bleeding and vaginal discharge.   Neurological: Negative for headaches.      Objective:     Vital Signs (Most Recent):  Temp: 98.1 °F (36.7 °C) (01/08/18 0949)  Pulse: 94 (01/08/18 0949)  Resp: 18 (01/08/18 0949)  SpO2: 100 % (01/08/18 0949) Vital Signs (24h Range):  Temp:  [98.1 °F (36.7 °C)] 98.1 °F (36.7 °C)  Pulse:  [94] 94  Resp:  [18] 18  SpO2:  [100 %] 100 %  BP: (118)/(86) 118/86     Weight: 71.2 kg (157 lb)  Body mass index is 28.72 kg/m².    FHT: 140, + accels, - decels, Cat 1 (reassuring)  TOCO: Not present    Physical Exam:   Constitutional: She is oriented to person, place, and time. She appears well-developed and well-nourished.    HENT:   Head: Normocephalic and atraumatic.    Eyes: EOM are normal.    Neck: Normal range of motion.    Cardiovascular: Normal rate.     Pulmonary/Chest: Effort normal. No respiratory distress.        Abdominal: Soft. There is no tenderness.   Gravid                  Neurological: She is alert and oriented to person, place, and time.    Skin: Skin is warm and dry.    Psychiatric: She has a normal mood and affect. Her behavior is normal. Judgment and thought content normal.       Cervix:  Dilation:  2  Effacement:  70  Station: -2  Presentation: Vertex     Significant Labs:  Lab Results   Component Value Date    GROUPTRH A POS 06/27/2017    HEPBSAG Negative 06/27/2017    STREPBCULT  12/26/2017     STREPTOCOCCUS AGALACTIAE (GROUP B)  Beta-hemolytic streptococci are routinely susceptible to   penicillins,cephalosporins and carbapenems.         I have personallly reviewed all pertinent lab results from the last 24 hours.

## 2018-01-08 NOTE — PROGRESS NOTES
"Indication  ========    GDM - diet, AMA: sono for EFW, MVP, and overall fetal well being (Dr. Adams).    History  ======    General History  Other: GDM - diet  AMA: declined genetic screening or invasive procedure  twin loss at 17 weeks, bleeding during pregnancy  hypothyroid on synthroid  Previous Outcomes   4  Para 1  Ybarra children born living (T) 1  Ybarra children born (T) 1  Abortions (A) 2  Ybarra living children (L) 1  Miscarriages 1  Ectopic 1    Maternal Assessment  =================    Weight 71 kg  Weight (lb) 157 lb  BP syst 118 mmHg  BP diast 86 mmHg    Method  ======    Transabdominal ultrasound examination. View: Sufficient.    Pregnancy  =========    Ybarra pregnancy. Number of fetuses: 1.    Dating  ======    LMP on: 4/15/2017  Cycle: regular cycle  GA by LMP 38 w + 2 d  SHAYLEE by LMP: 2018  "Stated Dating" on: 2017  Type of external assessment: CRL  U/S measurement at external assessment date 5.7 mm  GA at "stated dating" date 6 w + 2 d  GA by "stated dating" 38 w + 1 d  SHAYLEE by "stated dating": 2018  Ultrasound examination on: 2018  GA by U/S based upon: AC, BPD, Femur, HC  GA by U/S 38 w + 0 d  SHAYLEE by U/S: 2018  Assigned: Dating performed on 09/15/2017, based on the external assessment (on 2017)  Assigned GA 38 w + 1 d  Assigned SHAYLEE: 2018    General Evaluation  ==============    Cardiac activity: present.  bpm.  Fetal movements: visualized.  Presentation: cephalic.  Placenta:  Placental site: anterior.  Umbilical cord: Cord vessels: 3 vessel cord.  Amniotic fluid: Amount of AF: oligohydramnios. MVP 1.9 cm. AVINASH 3.3 cm. Q1 1.9 cm, Q2 0.0 cm, Q3 1.1 cm, Q4 1.4 cm.    Biophysical Profile  ==============    2: Fetal breathing movements  2: Gross body movements  2: Fetal tone  0: Amniotic fluid volume  : Biophysical profile score    Fetal Biometry  ============    Fetal Biometry  BPD 91.1 mm 37w 0d Hadlock  .7 mm  .5 mm 39w 2d " Hadlock  .2 mm 38w 1d Hadlock  Femur 73.4 mm 37w 4d Hadlock  EFW 3,364 g 57% Vernon  Calculated by: Hadlock (BPD-HC-AC-FL)  EFW (lb) 7 lb  EFW (oz) 7 oz  Cephalic index 0.79  HC / AC 1.00  FL / BPD 0.81  FL / AC 0.21  MVP 1.9 cm  AVINASH 3.3 cm   bpm    Fetal Anatomy  ===========    Cranium: normal  4-chamber view: documented previously  Stomach: normal  Kidneys: normal  Bladder: normal  Wants to know gender: yes  Other: A full anatomic survey has been previously performed.    Impression  =========    1. 38w 1d zaman intrauterine pregnancy  2. EFW = 3364 gms at 57%  3. BPP without NST = 6/8 (2 off for fluid)  4. Oligohydramnios (MVP 1.9cm, AVINASH 3.3cm)  5. Cephalic presentation    Patient and her  were counseled on today's ultrasound evaluation and recommendation of delivery. Her questions were answered.    Recommendation  ==============    Patient sent to labor and delivery for delivery  Dr. Adams was notified and agreed: management per Dr. Adams.

## 2018-01-08 NOTE — HPI
Evelia Mishra is a 37 y.o. [id number] at 38w1d presents for IOL following findings of oligohydramnios in prenatal testing this morning. Patient denies contractions, denies vaginal bleeding, denies LOF.   Fetal Movement: normal.   This IUP is complicated by diet-controlled GDM, hypothyroidism, AMA, and GBS+.

## 2018-01-08 NOTE — ASSESSMENT & PLAN NOTE
- admit to L&D for IOL  - Consents signed and to chart  - Admit to Labor and Delivery unit  - Plan for induction is pitocin   - Epidural per Anesthesia  - Draw CBC, T&S  - Notify Staff  - Ultrasound performed, fetus in vertex position.   - Recheck in 2 hrs or PRN  - Post-Partum Hemorrhage risk - low

## 2018-01-08 NOTE — PROGRESS NOTES
"LABOR NOTE    37 y.o. [id number] at 38w1d presents for IOL for oligo.    S:  Complaints: No.  Epidural working:  yes      O: /80   Pulse 79   Temp 98.1 °F (36.7 °C)   Resp 18   Ht 5' 2" (1.575 m)   Wt 71.2 kg (157 lb)   LMP 04/15/2017   SpO2 99%   Breastfeeding? No   BMI 28.72 kg/m²       FHT: 140, mod btbv, + accels, - decels.  Cat 1 (reassuring)  CTX: q 2-4 minutes  SVE: 3/70/-2      ASSESSMENT:   37 y.o. Q6E2014L at 38w1d presents for IOL for oligo.    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        Resolved Hospital Problems    Diagnosis Date Resolved POA   No resolved problems to display.         PLAN:    Continue Close Maternal/Fetal Monitoring  Pitocin Augmentation per protocol, now @ 10  Recheck 2 hours or PRN    Sushma K. Reddy  PGY-1, OBGYN  "

## 2018-01-09 LAB
BASOPHILS NFR BLD: 0 %
DIFFERENTIAL METHOD: ABNORMAL
EOSINOPHIL NFR BLD: 1 %
ERYTHROCYTE [DISTWIDTH] IN BLOOD BY AUTOMATED COUNT: 14.3 %
HCT VFR BLD AUTO: 30.1 %
HGB BLD-MCNC: 10.1 G/DL
LYMPHOCYTES NFR BLD: 11 %
MCH RBC QN AUTO: 29.9 PG
MCHC RBC AUTO-ENTMCNC: 33.6 G/DL
MCV RBC AUTO: 89 FL
MONOCYTES NFR BLD: 4 %
NEUTROPHILS NFR BLD: 83 %
NEUTS BAND NFR BLD MANUAL: 1 %
PLATELET # BLD AUTO: 157 K/UL
PLATELET BLD QL SMEAR: ABNORMAL
PMV BLD AUTO: 10.5 FL
POCT GLUCOSE: 176 MG/DL (ref 70–110)
POLYCHROMASIA BLD QL SMEAR: ABNORMAL
RBC # BLD AUTO: 3.38 M/UL
WBC # BLD AUTO: 16.81 K/UL

## 2018-01-09 PROCEDURE — 72100003 HC LABOR CARE, EA. ADDL. 8 HRS

## 2018-01-09 PROCEDURE — 59400 OBSTETRICAL CARE: CPT | Mod: AT,,, | Performed by: OBSTETRICS & GYNECOLOGY

## 2018-01-09 PROCEDURE — 36415 COLL VENOUS BLD VENIPUNCTURE: CPT

## 2018-01-09 PROCEDURE — 25000003 PHARM REV CODE 250: Performed by: STUDENT IN AN ORGANIZED HEALTH CARE EDUCATION/TRAINING PROGRAM

## 2018-01-09 PROCEDURE — 63600175 PHARM REV CODE 636 W HCPCS: Performed by: STUDENT IN AN ORGANIZED HEALTH CARE EDUCATION/TRAINING PROGRAM

## 2018-01-09 PROCEDURE — 85007 BL SMEAR W/DIFF WBC COUNT: CPT

## 2018-01-09 PROCEDURE — 85027 COMPLETE CBC AUTOMATED: CPT

## 2018-01-09 PROCEDURE — 51702 INSERT TEMP BLADDER CATH: CPT

## 2018-01-09 PROCEDURE — 72200005 HC VAGINAL DELIVERY LEVEL II

## 2018-01-09 PROCEDURE — 11000001 HC ACUTE MED/SURG PRIVATE ROOM

## 2018-01-09 RX ORDER — ONDANSETRON 8 MG/1
8 TABLET, ORALLY DISINTEGRATING ORAL EVERY 8 HOURS PRN
Status: DISCONTINUED | OUTPATIENT
Start: 2018-01-09 | End: 2018-01-10 | Stop reason: HOSPADM

## 2018-01-09 RX ORDER — DIPHENHYDRAMINE HYDROCHLORIDE 50 MG/ML
25 INJECTION INTRAMUSCULAR; INTRAVENOUS EVERY 4 HOURS PRN
Status: DISCONTINUED | OUTPATIENT
Start: 2018-01-09 | End: 2018-01-10 | Stop reason: HOSPADM

## 2018-01-09 RX ORDER — OXYTOCIN/RINGER'S LACTATE 20/1000 ML
41.65 PLASTIC BAG, INJECTION (ML) INTRAVENOUS CONTINUOUS
Status: ACTIVE | OUTPATIENT
Start: 2018-01-09 | End: 2018-01-09

## 2018-01-09 RX ORDER — METHYLERGONOVINE MALEATE 0.2 MG/ML
200 INJECTION INTRAVENOUS
Status: DISCONTINUED | OUTPATIENT
Start: 2018-01-09 | End: 2018-01-10 | Stop reason: HOSPADM

## 2018-01-09 RX ORDER — ACETAMINOPHEN 325 MG/1
650 TABLET ORAL EVERY 6 HOURS PRN
Status: DISCONTINUED | OUTPATIENT
Start: 2018-01-09 | End: 2018-01-10 | Stop reason: HOSPADM

## 2018-01-09 RX ORDER — DIPHENHYDRAMINE HCL 25 MG
25 CAPSULE ORAL EVERY 4 HOURS PRN
Status: DISCONTINUED | OUTPATIENT
Start: 2018-01-09 | End: 2018-01-10 | Stop reason: HOSPADM

## 2018-01-09 RX ORDER — LEVOTHYROXINE SODIUM 75 UG/1
75 TABLET ORAL
Status: DISCONTINUED | OUTPATIENT
Start: 2018-01-09 | End: 2018-01-10 | Stop reason: HOSPADM

## 2018-01-09 RX ORDER — IBUPROFEN 600 MG/1
600 TABLET ORAL EVERY 6 HOURS
Status: DISCONTINUED | OUTPATIENT
Start: 2018-01-09 | End: 2018-01-10 | Stop reason: HOSPADM

## 2018-01-09 RX ORDER — MISOPROSTOL 200 UG/1
600 TABLET ORAL
Status: DISCONTINUED | OUTPATIENT
Start: 2018-01-09 | End: 2018-01-10 | Stop reason: HOSPADM

## 2018-01-09 RX ORDER — HYDROCODONE BITARTRATE AND ACETAMINOPHEN 5; 325 MG/1; MG/1
1 TABLET ORAL EVERY 4 HOURS PRN
Status: DISCONTINUED | OUTPATIENT
Start: 2018-01-09 | End: 2018-01-10 | Stop reason: HOSPADM

## 2018-01-09 RX ORDER — DOCUSATE SODIUM 100 MG/1
200 CAPSULE, LIQUID FILLED ORAL 2 TIMES DAILY PRN
Status: DISCONTINUED | OUTPATIENT
Start: 2018-01-09 | End: 2018-01-10 | Stop reason: HOSPADM

## 2018-01-09 RX ORDER — HYDROCODONE BITARTRATE AND ACETAMINOPHEN 10; 325 MG/1; MG/1
1 TABLET ORAL EVERY 4 HOURS PRN
Status: DISCONTINUED | OUTPATIENT
Start: 2018-01-09 | End: 2018-01-10 | Stop reason: HOSPADM

## 2018-01-09 RX ORDER — HYDROCORTISONE 25 MG/G
CREAM TOPICAL 3 TIMES DAILY PRN
Status: DISCONTINUED | OUTPATIENT
Start: 2018-01-09 | End: 2018-01-10 | Stop reason: HOSPADM

## 2018-01-09 RX ORDER — CARBOPROST TROMETHAMINE 250 UG/ML
250 INJECTION, SOLUTION INTRAMUSCULAR
Status: DISCONTINUED | OUTPATIENT
Start: 2018-01-09 | End: 2018-01-10 | Stop reason: HOSPADM

## 2018-01-09 RX ADMIN — ACETAMINOPHEN 650 MG: 325 TABLET ORAL at 10:01

## 2018-01-09 RX ADMIN — Medication 41.65 MILLI-UNITS/MIN: at 12:01

## 2018-01-09 RX ADMIN — ACETAMINOPHEN 650 MG: 325 TABLET ORAL at 05:01

## 2018-01-09 RX ADMIN — IBUPROFEN 600 MG: 600 TABLET, FILM COATED ORAL at 12:01

## 2018-01-09 RX ADMIN — LEVOTHYROXINE SODIUM 75 MCG: 75 TABLET ORAL at 05:01

## 2018-01-09 RX ADMIN — DOCUSATE SODIUM 200 MG: 100 CAPSULE, LIQUID FILLED ORAL at 05:01

## 2018-01-09 RX ADMIN — ACETAMINOPHEN 650 MG: 325 TABLET ORAL at 11:01

## 2018-01-09 RX ADMIN — ACETAMINOPHEN 650 MG: 325 TABLET ORAL at 04:01

## 2018-01-09 RX ADMIN — IBUPROFEN 600 MG: 600 TABLET, FILM COATED ORAL at 06:01

## 2018-01-09 NOTE — ANESTHESIA POSTPROCEDURE EVALUATION
"Anesthesia Post Evaluation    Patient: Evelia Mishra    Procedure(s) Performed: * No procedures listed *    Final Anesthesia Type: epidural  Patient location during evaluation: labor & delivery  Patient participation: Yes- Able to Participate  Level of consciousness: awake and alert and oriented  Post-procedure vital signs: reviewed and stable  Pain management: adequate  Airway patency: patent  PONV status at discharge: No PONV  Anesthetic complications: no      Cardiovascular status: blood pressure returned to baseline  Respiratory status: unassisted  Hydration status: euvolemic  Follow-up not needed.        Visit Vitals  /66   Pulse 94   Temp 36.8 °C (98.2 °F) (Oral)   Resp 18   Ht 5' 2" (1.575 m)   Wt 71.2 kg (157 lb)   LMP 04/15/2017   SpO2 97%   Breastfeeding? Yes   BMI 28.72 kg/m²       Pain/Nicholas Score: Pain Rating Prior to Med Admin: 8 (1/9/2018 12:30 PM)  Pain Rating Post Med Admin: 0 (1/9/2018  6:00 AM)      "

## 2018-01-09 NOTE — PROGRESS NOTES
[facility]  Obstetrics  Postpartum Progress Note    Patient Name: Evelia Mishra  MRN: [id number]  Admission Date: 2018  Hospital Length of Stay: 1 days  Attending Physician: Мария Adams MD  Primary Care Provider: Gely Garcia MD    Subjective:     Principal Problem: (spontaneous vaginal delivery)    Hospital course: 2018 - admitted for IOL 2/2 oligo in PNT today. Underwent  with no complications.  2018 - doing well this morning, meeting postpartum goals.    Interval History: PPD#1    She is doing well this morning. She is tolerating a regular diet without nausea or vomiting. She is voiding spontaneously. She is ambulating. She has passed flatus, and has not had a BM. Vaginal bleeding is mild. She denies fever or chills. Abdominal pain is mild and controlled with oral medications. She is breastfeeding. She desires circumcision for her male baby: yes.    Objective:     Vital Signs (Most Recent):  Temp: 98.2 °F (36.8 °C) (18 033)  Pulse: 87 (18)  Resp: 20 (18)  BP: 124/74 (18)  SpO2: 98 % (18) Vital Signs (24h Range):  Temp:  [97.1 °F (36.2 °C)-98.7 °F (37.1 °C)] 98.2 °F (36.8 °C)  Pulse:  [] 87  Resp:  [16-20] 20  SpO2:  [98 %-100 %] 98 %  BP: (110-137)/(61-86) 124/74     Weight: 71.2 kg (157 lb)  Body mass index is 28.72 kg/m².      Intake/Output Summary (Last 24 hours) at 18 0647  Last data filed at 18 0530   Gross per 24 hour   Intake           1508.9 ml   Output             1830 ml   Net           -321.1 ml       Significant Labs:  Lab Results   Component Value Date    GROUPTRH A POS 2018    HEPBSAG Negative 2017    STREPBCULT  2017     STREPTOCOCCUS AGALACTIAE (GROUP B)  Beta-hemolytic streptococci are routinely susceptible to   penicillins,cephalosporins and carbapenems.         Recent Labs  Lab 18  0941   HGB 11.8*   HCT 35.0*       I have personallly reviewed all  pertinent lab results from the last 24 hours.    Physical Exam:   Constitutional: She is oriented to person, place, and time. She appears well-developed and well-nourished.    HENT:   Head: Normocephalic and atraumatic.    Eyes: EOM are normal.    Neck: Normal range of motion.    Cardiovascular: Normal rate.     Pulmonary/Chest: Effort normal. No respiratory distress.        Abdominal: Soft. There is no tenderness. There is no rebound and no guarding.   Fundus firm below umbilicus             Musculoskeletal: Normal range of motion and moves all extremeties.       Neurological: She is alert and oriented to person, place, and time.    Skin: Skin is warm and dry.    Psychiatric: She has a normal mood and affect. Her behavior is normal. Judgment and thought content normal.       Assessment/Plan:     37 y.o. female  for:    *  (spontaneous vaginal delivery)    Postpartum care:  - Patient doing well. Continue routine management and advances.  - Continue PO pain meds. Pain well controlled.  - Encourage ambulation.   - Heme: Pre Delivery h/h  --> Post Delivery h/h p  - Circumcision - Consents signed and order placed   - Contraception - defer to primary OBGYN  - Lactation - The patient is breastfeeding. Lactation nurse following along PRN  - Rh Status - pos        Gestational diabetes mellitus (GDM) in third trimester    - diet-controlled  - postpartum fasting glucose 176            Disposition: As patient meets milestones, will plan to discharge PPD#1-2.    Sushma K Reddy, MD  Obstetrics  [facility]

## 2018-01-09 NOTE — ASSESSMENT & PLAN NOTE
Postpartum care:  - Patient doing well. Continue routine management and advances.  - Continue PO pain meds. Pain well controlled.  - Encourage ambulation.   - Heme: Pre Delivery h/h 11/35 --> Post Delivery h/h p  - Circumcision - Consents signed and order placed   - Contraception - defer to primary OBGYN  - Lactation - The patient is breastfeeding. Lactation nurse following along PRN  - Rh Status - pos

## 2018-01-09 NOTE — PROGRESS NOTES
"LABOR NOTE    37 y.o. [id number] at 38w1d here for IOL for oligo.    S:  Complaints: No.  Epidural working:  yes      O: /77   Pulse 76   Temp 98.1 °F (36.7 °C)   Resp 18   Ht 5' 2" (1.575 m)   Wt 71.2 kg (157 lb)   LMP 04/15/2017   SpO2 100%   Breastfeeding? No   BMI 28.72 kg/m²       FHT: 130, mod btbv, + accels, occasional early decels.  Cat 2 (reassuring)  CTX: q 2-4 minutes  SVE: /-2, OP      ASSESSMENT:   37 y.o. [id number] at 38w1d here for IOL for oligo.    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        Resolved Hospital Problems    Diagnosis Date Resolved POA   No resolved problems to display.         PLAN:    Continue Close Maternal/Fetal Monitoring  Pitocin Augmentation per protocol, now @ 20  Recheck 2 hours or PRN    "

## 2018-01-09 NOTE — SUBJECTIVE & OBJECTIVE
Hospital course: 2018 - admitted for IOL 2/2 oligo in PNT today. Underwent  with no complications.  2018 - doing well this morning, meeting postpartum goals.    Interval History: PPD#1    She is doing well this morning. She is tolerating a regular diet without nausea or vomiting. She is voiding spontaneously. She is ambulating. She has passed flatus, and has not had a BM. Vaginal bleeding is mild. She denies fever or chills. Abdominal pain is mild and controlled with oral medications. She is breastfeeding. She desires circumcision for her male baby: yes.    Objective:     Vital Signs (Most Recent):  Temp: 98.2 °F (36.8 °C) (18)  Pulse: 87 (18)  Resp: 20 (18)  BP: 124/74 (18)  SpO2: 98 % (18) Vital Signs (24h Range):  Temp:  [97.1 °F (36.2 °C)-98.7 °F (37.1 °C)] 98.2 °F (36.8 °C)  Pulse:  [] 87  Resp:  [16-20] 20  SpO2:  [98 %-100 %] 98 %  BP: (110-137)/(61-86) 124/74     Weight: 71.2 kg (157 lb)  Body mass index is 28.72 kg/m².      Intake/Output Summary (Last 24 hours) at 18 0647  Last data filed at 18 0530   Gross per 24 hour   Intake           1508.9 ml   Output             1830 ml   Net           -321.1 ml       Significant Labs:  Lab Results   Component Value Date    GROUPTRH A POS 2018    HEPBSAG Negative 2017    STREPBCULT  2017     STREPTOCOCCUS AGALACTIAE (GROUP B)  Beta-hemolytic streptococci are routinely susceptible to   penicillins,cephalosporins and carbapenems.         Recent Labs  Lab 18  0941   HGB 11.8*   HCT 35.0*       I have personallly reviewed all pertinent lab results from the last 24 hours.    Physical Exam:   Constitutional: She is oriented to person, place, and time. She appears well-developed and well-nourished.    HENT:   Head: Normocephalic and atraumatic.    Eyes: EOM are normal.    Neck: Normal range of motion.    Cardiovascular: Normal rate.     Pulmonary/Chest: Effort  normal. No respiratory distress.        Abdominal: Soft. There is no tenderness. There is no rebound and no guarding.   Fundus firm below umbilicus             Musculoskeletal: Normal range of motion and moves all extremeties.       Neurological: She is alert and oriented to person, place, and time.    Skin: Skin is warm and dry.    Psychiatric: She has a normal mood and affect. Her behavior is normal. Judgment and thought content normal.

## 2018-01-09 NOTE — PLAN OF CARE
Problem: Patient Care Overview  Goal: Plan of Care Review  Outcome: Ongoing (interventions implemented as appropriate)  Lactation note:  Lactation consultant's first visit with patient. Reviewed the breastfeeding guide and encouraged the patient to feed infant 8 or more times in 24 hours on cue until content. Offered to assist with breastfeeding at next feeding. Patient called and LC assisted with deeper latch as mom states nipples tender. Infant nursing effectively and mom to use breast milk after nursing for comfort.  Left LC phone number on board for patient to call for assistance.

## 2018-01-09 NOTE — L&D DELIVERY NOTE
cephalic after approximately 20 minutes of maternal pushing.  Under epidural anesthesia.  Infant delivered OA over intact perineum.  Male infant also tolerated the delivery well and was placed on mothers abdomen for skin to skin and bulb suctioning performed.  Cord clamped and cut.  Umbilical arterial gas and venous blood obtained.  One left vaginal sidewall first degree laceration bled heavily until made hemostatic with 3-0 vicryl. A small second degree posterior vaginal/perineal laceration was repaired with 2-0 vicryl until found to be hemostatic.  Placenta delivered spontaneously and IV pitocin given.  Uterine tone noted. No cervical lacerations.  Patient tolerated delivery well.   cc; most blood loss is presumed to have been lost from the vaginal sidewall. There was NO concern for post-partum hemorrhage secondary to uterine atony.  Staff present for entire procedure.  S/L/N counts correct x2.     I WAS SCRUBBED AND PRESENT THROUGHOUT THE DELIVERY    Delivery Information for  Arash Mishra    Birth information:  YOB: 2017   Time of birth: 11:41 PM   Sex: male   Head Delivery Date/Time: 2018 11:41 PM   Delivery type: Vaginal, Spontaneous Delivery   Gestational Age: 38w1d    Delivery Providers    Delivering clinician:  Мария Adams MD   Other personnel:   Provider Role   Stacey Hernandez MD Resident   Kimberley Redman MD Resident   Tana Medina, RN Delivery Nurse   Sheri Jerome, RN Charge Nurse   [facility]               [place] Measurements    Weight:  3260 g Length:  52.1 cm   Head circum.:  35.6 cm Chest circum.:  33.7 cm           Assessment    Living status:  Living  Apgars:     1 Minute:   5 Minute:   10 Minute:   15 Minute:   20 Minute:     Skin Color:   1  1       Heart Rate:   2  2       Reflex Irritability:   1  2       Muscle Tone:   2  2       Respiratory Effort:   2  2       Total:   8  9               Apgars Assigned  By:  SAMY LUU RN         Assisted Delivery Details:    Forceps attempted?:  No  Vacuum extractor attempted?:  No         Shoulder Dystocia    Shoulder dystocia present?:  No           Presentation and Position    Presentation:   Vertex   Position:       Occiput    Posterior            Interventions/Resuscitation    Method:  Bulb Suctioning, Tactile Stimulation       Cord    Vessels:  3 vessels  Complications:  None  Delayed Cord Clamping?:  No  Cord Blood Disposition:  Sent with Baby  Gases Sent?:  No  Stem Cell Collection (by MD):  No       Placenta    Date and time:  2018 12:04 AM  Removal:  Spontaneous  Appearance:  Intact  Placenta disposition:  discarded           Labor Events:       labor: No     Labor Onset Date/Time:         Dilation Complete Date/Time:         Start Pushing Date/Time: 2018 23:30     Rupture Date/Time:              Rupture type:           Fluid Amount:        Fluid Color:        Fluid Odor:        Membrane Status (PeriCalm): ARM (Artificial Rupture)      Rupture Date/Time (PeriCalm): 2018 10:22:00      Fluid Amount (PeriCalm): Small      Fluid Color (PeriCalm): Clear       steroids: None     Antibiotics given for GBS: Yes     Induction:       Indications for induction:        Augmentation: oxytocin     Indications for augmentation:       Labor complications: None     Additional complications:          Cervical ripening:                     Delivery:      Episiotomy: None     Indication for Episiotomy:       Perineal Lacerations: 2nd Repaired:  Yes   Periurethral Laceration: none Repaired:     Labial Laceration: none Repaired:     Sulcus Laceration: none Repaired:     Vaginal Laceration: No Repaired:     Cervical Laceration: No Repaired:     Repair suture:       Repair # of packets:       Vaginal delivery QBL (mL): 530      QBL (mL): 0     Combined Blood Loss (mL): 530     Vaginal Sweep Performed: Yes     Surgicount Correct:         Other  providers:       Anesthesia    Method:  Epidural          Details (if applicable):  Trial of Labor      Categorization:      Priority:     Indications for :     Incision Type:       Additional  information:  Forceps:    Vacuum:    Breech:    Observed anomalies    Other (Comments):         Kimberley Redman MD, PhD  OBGYN, PGY-2

## 2018-01-09 NOTE — LACTATION NOTE
01/09/18 1015   Maternal Infant Assessment   Breast Shape Bilateral:;round   Breast Density Bilateral:;soft   Areola Bilateral:;elastic   Nipple(s) Bilateral:;everted   Nipple Symptoms bilateral:;tender   Infant Assessment   Sucking Reflex present   Rooting Reflex present   Swallow Reflex present   LATCH Score   Latch 2-->grasps breast, tongue down, lips flanged, rhythmic sucking   Audible Swallowing 1-->a few with stimulation   Type Of Nipple 2-->everted (after stimulation)   Comfort (Breast/Nipple) 1-->filling, red/small blisters/bruises, mild/mod discomfort   Hold (Positioning) 1-->minimal assist, teach one side: mother does other, staff holds   Score (less than 7 for 2/more consecutive times, consult Lactation Consultant) 7       Number Scale   Presence of Pain denies   Location - Side Left   Location nipple(s)   Pain Rating: Rest 2   Pain Frequency frequent   Pain Quality soreness   Pain Management Interventions other (see comments)  (use breast milk after nursing; work on deep latch)   Maternal Infant Feeding   Infant Positioning cross-cradle   Signs of Milk Transfer audible swallow;infant jaw motion present   Time Spent (min) 0-15 min   Latch Assistance yes   Breastfeeding Education adequate infant intake;importance of skin-to-skin contact;increasing milk supply   Infant First Feeding   Skin-to-Skin Contact Maintained   Feeding Infant   Feeding Readiness Cues hand to mouth movements   Effective Latch During Feeding yes   Lactation Referrals   Lactation Consult Breastfeeding assessment   Lactation Interventions   Attachment Promotion breastfeeding assistance provided;counseling provided;skin-to-skin contact encouraged   Breastfeeding Assistance assisted with positioning;feeding cue recognition promoted;infant latch-on verified;infant stimulated to wakeful state;infant suck/swallow verified;milk expression/pumping

## 2018-01-09 NOTE — DISCHARGE INSTRUCTIONS
Breastfeeding Discharge Instructions       Feed the baby at the earliest sign of hunger or comfort  o Hands to mouth, sucking motions  o Rooting or searching for something to suck on  o Dont wait for crying - it is a sign of distress     The feedings may be 8-12 times per 24hrs and will not follow a schedule   Avoid pacifiers and bottles for the first 4 weeks   Alternate the breast you start the feeding with, or start with the breast that feels the fullest   Switch breasts when the baby takes himself off the breast or falls asleep   Keep offering breasts until the baby looks full, no longer gives hunger signs, and stays asleep when placed on his back in the crib   If the baby is sleepy and wont wake for a feeding, put the baby skin-to-skin dressed in a diaper against the mothers bare chest   Sleep near your baby   The baby should be positioned and latched on to the breast correctly  o Chest-to-chest, chin in the breast  o Babys lips are flipped outward  o Babys mouth is stretched open wide like a shout  o Babys sucking should feel like tugging to the mother  - The baby should be drinking at the breast:  o You should hear swallowing or gulping throughout the feeding  o You should see milk on the babys lips when he comes off the breast  o Your breasts should be softer when the baby is finished feeding  o The baby should look relaxed at the end of feedings  o After the 4th day and your milk is in:  o The babys poop should turn bright yellow and be loose, watery, and seedy  o The baby should have at least 3-4 poops the size of the palm of your hand per day  o The baby should have at least 5-6 wet diapers per day  o The urine should be light yellow in color  You should drink when you are thirsty and eat a healthy diet when you are    hungry.     Take naps to get the rest you need.   Take medications and/or drink alcohol only with permission of your obstetrician    or the babys pediatrician.  You can  also call the Infant Risk Center,   ([phone]), Monday-Friday, 8am-5pm Central time, to get the most   up-to-date evidence-based information on the use of medications during   pregnancy and breastfeeding.      The baby should be examined by a pediatrician at 3-5 days of age.  Once your   milk comes in, the baby should be gaining at least ½ - 1oz each day and should be back to birthweight no later than 10-14 days of age.          Community Resources    [facility] Breastfeeding Warmline: [phone]   Local [facility] clinics: provide incentives and breastpumps to eligible mothers  [employer] (LLLI):  mother-to-mother support group website        www.EZprints.com.[employer]  Local La Leche League mother-to-mother support groups:        www.AthletePath        La Leche League [facility]   Dr. Romel Russell website for latch videos and general information:        www.breastfeedinginc.ca  [facility] Risk [facility] is a call center that provides information about the safety of taking medications while breastfeeding.  Call [phone], M-F, 8am-5pm, [place].  International Lactation Consultant Association provides resources for assistance:        www.ilca.org  [facility]feeding [facility] provides informationand resources for parents  and the community    http://ChristianaCareastfeeding.org     Alyssa Gann is a mom-to-mom support group:                             www.[employer].com//breastfeedng-support/  [employer] for Healthy Babies:  4-455-229-BABY(0467)  [employer]: a breastfeeding support group for women of color, [phone]

## 2018-01-10 VITALS
HEART RATE: 83 BPM | WEIGHT: 157 LBS | TEMPERATURE: 98 F | RESPIRATION RATE: 18 BRPM | SYSTOLIC BLOOD PRESSURE: 112 MMHG | BODY MASS INDEX: 28.89 KG/M2 | HEIGHT: 62 IN | DIASTOLIC BLOOD PRESSURE: 63 MMHG | OXYGEN SATURATION: 99 %

## 2018-01-10 PROBLEM — O09.291 PRIOR POOR OBSTETRICAL HISTORY IN FIRST TRIMESTER, ANTEPARTUM: Status: RESOLVED | Noted: 2017-07-19 | Resolved: 2018-01-10

## 2018-01-10 LAB — POCT GLUCOSE: 74 MG/DL (ref 70–110)

## 2018-01-10 PROCEDURE — 25000003 PHARM REV CODE 250: Performed by: STUDENT IN AN ORGANIZED HEALTH CARE EDUCATION/TRAINING PROGRAM

## 2018-01-10 RX ORDER — IBUPROFEN 600 MG/1
600 TABLET ORAL EVERY 6 HOURS
Qty: 30 TABLET | Refills: 1 | Status: SHIPPED | OUTPATIENT
Start: 2018-01-10 | End: 2018-01-22

## 2018-01-10 RX ADMIN — LEVOTHYROXINE SODIUM 75 MCG: 75 TABLET ORAL at 06:01

## 2018-01-10 RX ADMIN — ACETAMINOPHEN 650 MG: 325 TABLET ORAL at 08:01

## 2018-01-10 RX ADMIN — IBUPROFEN 600 MG: 600 TABLET, FILM COATED ORAL at 12:01

## 2018-01-10 RX ADMIN — IBUPROFEN 600 MG: 600 TABLET, FILM COATED ORAL at 06:01

## 2018-01-10 NOTE — PLAN OF CARE
Problem: Patient Care Overview  Goal: Plan of Care Review  Outcome: Ongoing (interventions implemented as appropriate)  Lactation note:  Reviewed lactation discharge teaching with patient using the breastfeeding guide. The patient was able to latch her infant to the breast independently but more deeply with lactation assistance. Infant was nursing effectively. Encouraged nursing infant 8 or more times in 24 hours on cue until content. We discussed prevention and treatment of sore nipples and breast engorgement. The patient has lanolin to use as needed after nursing. The patient was taught how to perform hand expression and she has a breast pump. The patient has the lactation phone number to call as needed. Additional resources were placed on her AVS to be given at discharge.

## 2018-01-10 NOTE — PROGRESS NOTES
[facility]  Obstetrics  Postpartum Progress Note    Patient Name: Evelia Mishra  MRN: [id number]  Admission Date: 2018  Hospital Length of Stay: 2 days  Attending Physician: Мария Adams MD  Primary Care Provider: Gely Garcia MD    Subjective:     Principal Problem: (spontaneous vaginal delivery)    Hospital course: 2018 - admitted for IOL 2/2 oligo in PNT today. Underwent  with no complications.  2018 - doing well this morning, meeting postpartum goals.  01/10/2018 - PPD #2, doing well. Stable for discharge to home today. Meeting postpartum milestones.    Interval History:   PPD #2, doing well    She is doing well this morning. She is tolerating a regular diet without nausea or vomiting. She is voiding spontaneously. She is ambulating. She has passed flatus, and has not a BM. Vaginal bleeding is mild. She denies fever or chills. Abdominal pain is mild and controlled with oral medications. She is breastfeeding. Circumcision for her male baby is completed.    Objective:     Vital Signs (Most Recent):  Temp: 98 °F (36.7 °C) (01/10/18 0000)  Pulse: 85 (01/10/18 0000)  Resp: 20 (01/10/18 0000)  BP: 127/77 (01/10/18 0000)  SpO2: 97 % (01/10/18 0000) Vital Signs (24h Range):  Temp:  [98 °F (36.7 °C)-98.2 °F (36.8 °C)] 98 °F (36.7 °C)  Pulse:  [79-94] 85  Resp:  [17-20] 20  SpO2:  [97 %-98 %] 97 %  BP: (113-127)/(65-77) 127/77     Weight: 71.2 kg (157 lb)  Body mass index is 28.72 kg/m².      Intake/Output Summary (Last 24 hours) at 01/10/18 7723  Last data filed at 18 0530   Gross per 24 hour   Intake                0 ml   Output              200 ml   Net             -200 ml       Significant Labs:  Lab Results   Component Value Date    GROUPTRH A POS 2018    HEPBSAG Negative 2017    STREPBCULT  2017     STREPTOCOCCUS AGALACTIAE (GROUP B)  Beta-hemolytic streptococci are routinely susceptible to   penicillins,cephalosporins and carbapenems.          Recent Labs  Lab 18  1052   HGB 10.1*   HCT 30.1*       I have personallly reviewed all pertinent lab results from the last 24 hours.    Physical Exam:   Constitutional: She is oriented to person, place, and time. She appears well-developed and well-nourished.    HENT:   Head: Normocephalic and atraumatic.    Eyes: Conjunctivae and EOM are normal. Pupils are equal, round, and reactive to light.    Neck: Normal range of motion. Neck supple.    Cardiovascular: Normal rate.     Pulmonary/Chest: Effort normal. No respiratory distress.        Abdominal: Soft. There is no tenderness. There is no rebound and no guarding.   Fundus firm below umbilicus     Genitourinary:   Genitourinary Comments: deferred           Musculoskeletal: Normal range of motion and moves all extremeties.       Neurological: She is alert and oriented to person, place, and time.    Skin: Skin is warm and dry.    Psychiatric: She has a normal mood and affect. Her behavior is normal. Judgment and thought content normal.       Assessment/Plan:     37 y.o. female  for:    *  (spontaneous vaginal delivery)    Postpartum care:  - Patient doing well. Continue routine management and advances.  - Continue PO pain meds. Pain well controlled.  - Encourage ambulation.   - Heme: Pre Delivery h/h  --> Post Delivery h/h 10.30.1  - Circumcision - Completed   - Contraception - defer to primary OBGYN  - Lactation - The patient is breastfeeding.   - Rh Status - pos    Patient is stable for discharge to home today, PPD #2        Gestational diabetes mellitus (GDM) in third trimester    - diet-controlled  - postpartum fasting glucose 176  - to be followed at 6 week pp visit        Hypothyroidism affecting pregnancy    - continue home synthroid 75 mcg daily            Disposition: As patient meets milestones, will plan to discharge PPD #2.    Kimberley Redman MD  Obstetrics  [facility]

## 2018-01-10 NOTE — ASSESSMENT & PLAN NOTE
Postpartum care:  - Patient doing well. Continue routine management and advances.  - Continue PO pain meds. Pain well controlled.  - Encourage ambulation.   - Heme: Pre Delivery h/h 11/35 --> Post Delivery h/h 10.1/30.1  - Circumcision - Completed   - Contraception - defer to primary OBGYN  - Lactation - The patient is breastfeeding.   - Rh Status - pos    Patient is stable for discharge to home today, PPD #2

## 2018-01-10 NOTE — SUBJECTIVE & OBJECTIVE
Hospital course: 2018 - admitted for IOL 2/2 oligo in PNT today. Underwent  with no complications.  2018 - doing well this morning, meeting postpartum goals.  01/10/2018 - PPD #2, doing well. Stable for discharge to home today. Meeting postpartum milestones.    Interval History:   PPD #2, doing well    She is doing well this morning. She is tolerating a regular diet without nausea or vomiting. She is voiding spontaneously. She is ambulating. She has passed flatus, and has not a BM. Vaginal bleeding is mild. She denies fever or chills. Abdominal pain is mild and controlled with oral medications. She is breastfeeding. Circumcision for her male baby is completed.    Objective:     Vital Signs (Most Recent):  Temp: 98 °F (36.7 °C) (01/10/18 0000)  Pulse: 85 (01/10/18 0000)  Resp: 20 (01/10/18 0000)  BP: 127/77 (01/10/18 0000)  SpO2: 97 % (01/10/18 0000) Vital Signs (24h Range):  Temp:  [98 °F (36.7 °C)-98.2 °F (36.8 °C)] 98 °F (36.7 °C)  Pulse:  [79-94] 85  Resp:  [17-20] 20  SpO2:  [97 %-98 %] 97 %  BP: (113-127)/(65-77) 127/77     Weight: 71.2 kg (157 lb)  Body mass index is 28.72 kg/m².      Intake/Output Summary (Last 24 hours) at 01/10/18 0351  Last data filed at 18 0530   Gross per 24 hour   Intake                0 ml   Output              200 ml   Net             -200 ml       Significant Labs:  Lab Results   Component Value Date    GROUPTRH A POS 2018    HEPBSAG Negative 2017    STREPBCULT  2017     STREPTOCOCCUS AGALACTIAE (GROUP B)  Beta-hemolytic streptococci are routinely susceptible to   penicillins,cephalosporins and carbapenems.         Recent Labs  Lab 18  1052   HGB 10.1*   HCT 30.1*       I have personallly reviewed all pertinent lab results from the last 24 hours.    Physical Exam:   Constitutional: She is oriented to person, place, and time. She appears well-developed and well-nourished.    HENT:   Head: Normocephalic and atraumatic.    Eyes: Conjunctivae  and EOM are normal. Pupils are equal, round, and reactive to light.    Neck: Normal range of motion. Neck supple.    Cardiovascular: Normal rate.     Pulmonary/Chest: Effort normal. No respiratory distress.        Abdominal: Soft. There is no tenderness. There is no rebound and no guarding.   Fundus firm below umbilicus     Genitourinary:   Genitourinary Comments: deferred           Musculoskeletal: Normal range of motion and moves all extremeties.       Neurological: She is alert and oriented to person, place, and time.    Skin: Skin is warm and dry.    Psychiatric: She has a normal mood and affect. Her behavior is normal. Judgment and thought content normal.

## 2018-01-10 NOTE — DISCHARGE SUMMARY
[facility]  Obstetrics  Discharge Summary      Patient Name: Evelia Mishra  MRN: [id number]  Admission Date: 2018  Hospital Length of Stay: 2 days  Discharge Date and Time:  01/10/2018 1:43 AM  Attending Physician: Мария Adams MD   Discharging Provider: Kimberley Redman MD  Primary Care Provider: Gely Garcia MD    HPI: Evelia Mishra is a 37 y.o. [id number] at 38w1d presents for IOL following findings of oligohydramnios in prenatal testing this morning. Patient denies contractions, denies vaginal bleeding, denies LOF.   Fetal Movement: normal.   This IUP is complicated by diet-controlled GDM, hypothyroidism, AMA, and GBS+.    * No surgery found *     Hospital Course:   2018 - admitted for IOL 2/2 oligo in PNT today. Underwent  with no complications.  2018 - doing well this morning, meeting postpartum goals.        Final Active Diagnoses:    Diagnosis Date Noted POA    PRINCIPAL PROBLEM:   (spontaneous vaginal delivery) [O80] 2018 Not Applicable    Gestational diabetes mellitus (GDM) in third trimester [O24.419] 2017 Yes    Hypothyroidism affecting pregnancy [O99.280, E03.9] 2017 Yes    Prior poor obstetrical history in first trimester, antepartum [O09.291] 2017 Not Applicable    AMA (advanced maternal age) multigravida 35+ [O09.529] 2016 Yes     Chronic    Pelvic muscle wasting [N81.84] 10/07/2013 Yes    Positive PPD [R76.11] 2013 Yes      Problems Resolved During this Admission:    Diagnosis Date Noted Date Resolved POA    Oligohydramnios [O41.00X0] 2018 Yes    GBS (group B Streptococcus carrier), +RV culture, currently pregnant [O99.820] 2016 Not Applicable        Labs:   CBC   Recent Labs  Lab 18  0941 18  1052   WBC 6.37 16.81*   HGB 11.8* 10.1*   HCT 35.0* 30.1*    157       Feeding Method: breast    Immunizations     Date Immunization Status Dose Route/Site Given by     01/09/18 0043 MMR Incomplete 0.5 mL Subcutaneous/Left deltoid     01/09/18 0043 Tdap Incomplete 0.5 mL Intramuscular/Left deltoid           Delivery:    Episiotomy: None   Lacerations: 2nd   Repair suture:     Repair # of packets:     Blood loss (ml): 530     Birth information:  YOB: 2017   Time of birth: 11:41 PM   Sex: male   Delivery type: Vaginal, Spontaneous Delivery   Gestational Age: 38w1d    Delivery Clinician:      Other providers:       Additional  information:  Forceps:    Vacuum:    Breech:    Observed anomalies      Living?:           APGARS  One minute Five minutes Ten minutes   Skin color:         Heart rate:         Grimace:         Muscle tone:         Breathing:         Totals: 8  9        Placenta: Delivered:       appearance    Pending Diagnostic Studies:     None          Discharged Condition: good    Disposition: Home or Self Care    Follow Up:  Follow-up Information     Мария Adams MD In 6 weeks.    Specialties:  Obstetrics, Obstetrics and Gynecology  Why:  Post-partum check-up  Contact information:  [address] [address]  [phone]                 Patient Instructions:     Diet Adult Regular     Activity as tolerated     Notify your health care provider if you experience any of the following:  increased confusion or weakness     Notify your health care provider if you experience any of the following:  persistent dizziness, light-headedness, or visual disturbances     Notify your health care provider if you experience any of the following:  worsening rash     Notify your health care provider if you experience any of the following:  severe persistent headache     Notify your health care provider if you experience any of the following:  difficulty breathing or increased cough     Notify your health care provider if you experience any of the following:  redness, tenderness, or signs of infection (pain, swelling, redness, odor or green/yellow  discharge around incision site)     Notify your health care provider if you experience any of the following:  severe uncontrolled pain     Notify your health care provider if you experience any of the following:  persistent nausea and vomiting or diarrhea     Notify your health care provider if you experience any of the following:  temperature >100.4       Medications:  Current Discharge Medication List      START taking these medications    Details   ibuprofen (ADVIL,MOTRIN) 600 MG tablet Take 1 tablet (600 mg total) by mouth every 6 (six) hours.  Qty: 30 tablet, Refills: 1    Associated Diagnoses: Oligohydramnios; Diet controlled gestational diabetes mellitus (GDM) in third trimester;  (spontaneous vaginal delivery); GBS (group B Streptococcus carrier), +RV culture, currently pregnant; Elderly multigravida in third trimester         CONTINUE these medications which have NOT CHANGED    Details   blood sugar diagnostic Strp 1 strip by Misc.(Non-Drug; Combo Route) route 5 (five) times daily.  Qty: 200 strip, Refills: 6    Comments: Please dispense insurance preferred.  Associated Diagnoses: Diet controlled gestational diabetes mellitus (GDM) in third trimester      blood-glucose meter kit Use as instructed  Qty: 1 each, Refills: 1    Comments: Please dispense insurance preferred.  Associated Diagnoses: Diet controlled gestational diabetes mellitus (GDM) in third trimester      cholecalciferol, vitamin D3, (VITAMIN D3) 2,000 unit Cap Take 1 capsule by mouth once daily.      lancets Misc 1 each by Misc.(Non-Drug; Combo Route) route 5 (five) times daily.  Qty: 200 each, Refills: 3    Comments: Insurance peferred.      levothyroxine (SYNTHROID) 75 MCG tablet TAKE ONE TABLET (75 MCG TOTAL) BY MOUTH ONCE DAILY  Qty: 30 tablet, Refills: 2    Comments: Please consider 90 day supplies to promote better adherence      PRENATAL VIT #76/IRON,CARB/FA (PNV 29-1 ORAL) Take 1 tablet by mouth once daily.           Kimberley Redman,  MD  Obstetrics  [facility]

## 2018-01-10 NOTE — PLAN OF CARE
Problem: Patient Care Overview  Goal: Plan of Care Review  Outcome: Outcome(s) achieved Date Met: 01/10/18  Vs stable. Pt verbalized understanding of pain management. Pt left unit via wheelchair with infants in arms in no apparent distress.

## 2018-01-10 NOTE — LACTATION NOTE
01/10/18 0930   Maternal Infant Assessment   Breast Shape Left:;round   Breast Density Left:;soft   Areola Left:;elastic   Nipple(s) Left:;everted   Nipple Symptoms left:;tender   Infant Assessment   Sucking Reflex present   Rooting Reflex present   Swallow Reflex present   LATCH Score   Latch 1-->repeated attempts, holds nipple in mouth, stimulate to suck   Audible Swallowing 1-->a few with stimulation   Type Of Nipple 2-->everted (after stimulation)   Comfort (Breast/Nipple) 1-->filling, red/small blisters/bruises, mild/mod discomfort   Hold (Positioning) 1-->minimal assist, teach one side: mother does other, staff holds   Score (less than 7 for 2/more consecutive times, consult Lactation Consultant) 6       Number Scale   Presence of Pain complains of pain/discomfort   Location - Side Left   Location nipple(s)   Pain Rating: Rest 1   Pain Frequency intermittent   Pain Quality soreness   Pain Management Interventions other (see comments)  (work on deeper latch)   Maternal Infant Feeding   Infant Positioning cross-cradle   Signs of Milk Transfer audible swallow;infant jaw motion present   Time Spent (min) 15-30 min   Latch Assistance yes   Engorgement Measures complete emptying encouraged;supportive bra encouraged   Breastfeeding Education adequate infant intake;adequate milk volume;diet;importance of skin-to-skin contact;increasing milk supply;label/storage of breast milk;medication effects;milk expression, electric pump;milk expression, hand;prenatal vitamins continued;returning to work   Feeding Infant   Feeding Readiness Cues crying;hand to mouth movements   Effective Latch During Feeding yes   Skin-to-Skin Contact During Feeding no   Lactation Referrals   Lactation Consult Breastfeeding assessment;Follow up   Lactation Interventions   Attachment Promotion breastfeeding assistance provided;counseling provided;skin-to-skin contact encouraged   Breastfeeding Assistance assisted with positioning;feeding cue  recognition promoted;infant latch-on verified;infant stimulated to wakeful state;infant suck/swallow verified   Maternal Breastfeeding Support diary/feeding log utilized;encouragement offered;lactation counseling provided;maternal rest encouraged

## 2018-01-14 ENCOUNTER — PATIENT MESSAGE (OUTPATIENT)
Dept: ENDOCRINOLOGY | Facility: CLINIC | Age: 38
End: 2018-01-14

## 2018-01-14 ENCOUNTER — PATIENT MESSAGE (OUTPATIENT)
Dept: MATERNAL FETAL MEDICINE | Facility: CLINIC | Age: 38
End: 2018-01-14

## 2018-01-14 DIAGNOSIS — E03.9 HYPOTHYROIDISM AFFECTING PREGNANCY IN THIRD TRIMESTER: ICD-10-CM

## 2018-01-14 DIAGNOSIS — O99.283 HYPOTHYROIDISM AFFECTING PREGNANCY IN THIRD TRIMESTER: ICD-10-CM

## 2018-01-14 DIAGNOSIS — O24.410 DIET CONTROLLED GESTATIONAL DIABETES MELLITUS (GDM) IN THIRD TRIMESTER: Primary | ICD-10-CM

## 2018-01-15 ENCOUNTER — TELEPHONE (OUTPATIENT)
Dept: OBSTETRICS AND GYNECOLOGY | Facility: CLINIC | Age: 38
End: 2018-01-15

## 2018-01-15 NOTE — TELEPHONE ENCOUNTER
Mom left message on warmline. Returned call today around lunchtime. Mom concerned about decreased milk production. Had appt with ped-Dr Bailey on Fri 1/12/18. Baby's weight decreased from day before at ped's office & was down approx 10%. Also had inadequate number of voids/stools per mom. Ped recommended supplementation with formula & has been supplementing since Fri. BR prior to supplementing. Baby lazy at breast sometimes just feeding for comfort per mom. Discussed signs of adequate BR; I&O. Encouraged to stimulate baby to actively suck more during fdg. Discussed tips to increase milk production. Has hx of hypothyroid-encouraged to have thyroid levels checked. Stated that she did that about a month ago. Encouraged to pump for increased stimulation/supplementation. Inquired about rental pump. Requirements & fees discussed. Also offered OP consult for pre/post fdg weight checks. Fees discussed. Has another appt with ped tomorrow at 1100. Would like to schedule OP consult on Wed 1/17 at 0930 but wants to come in today to rent [employer] breast pump. Does not want to do OP consult today because baby was just fed formula & BR a short time ago. Lots of questions answered. Lots of encouragement & reassurance provided. Mom came in around 1300 today to rent [employer] breast pump. Paperwork completed & receipts provided. Instructed on use & cleaning of pump & kit. Assisted with pumping. Obtained about 20ml in about 15 mins. Praise provided. Discussed supply/demand. Encouraged to do lots of skin to skin, frequent BR with fdg cues at least 8+times/24hrs, pump several times per day for increased stimulation, supplement with EBM & formula as needed until baby able to BR more effectively & certain milk production is adequate. Questions answered. Instructed to call for any needs. Verbalized understanding.

## 2018-01-16 ENCOUNTER — TELEPHONE (OUTPATIENT)
Dept: OBSTETRICS AND GYNECOLOGY | Facility: CLINIC | Age: 38
End: 2018-01-16

## 2018-01-16 ENCOUNTER — LACTATION CONSULT (OUTPATIENT)
Dept: OBSTETRICS AND GYNECOLOGY | Facility: CLINIC | Age: 38
End: 2018-01-16
Payer: COMMERCIAL

## 2018-01-16 ENCOUNTER — PATIENT MESSAGE (OUTPATIENT)
Dept: OBSTETRICS AND GYNECOLOGY | Facility: CLINIC | Age: 38
End: 2018-01-16

## 2018-01-16 NOTE — PROGRESS NOTES
Baby was weighed before & after BR for a total of about 25 mins of sucking off & on with stimulation. Weight before BR was 3210 gm (7lb-1.3oz) & weight after was 3236 for total gain of 26 gm. Encouraged to make sure baby is actively sucking/swallowing; monitor I&O closely. Plan: BR/pump/supplement with EBM/formula as needed. Encouraged weight checks at ped's office. Encouraged f/u consult for pre/post weight checks again when decrease supplementation. Questions answered. Instructed to call for any further needs. Verbalized understanding.

## 2018-01-16 NOTE — TELEPHONE ENCOUNTER
F/u call done. Mom stated that she continues to have to stimulate baby to suck during BR. Has pumped twice after renting pump & pumping here in the lactation center. Obtained about 1 - 11/2 oz each time per mom. Praise & reassurance provided. Has appt with ped today at 11. Will come in for OP consult today after ped appt instead of tomorrow.

## 2018-01-17 ENCOUNTER — PATIENT MESSAGE (OUTPATIENT)
Dept: ENDOCRINOLOGY | Facility: CLINIC | Age: 38
End: 2018-01-17

## 2018-01-17 ENCOUNTER — TELEPHONE (OUTPATIENT)
Dept: OBSTETRICS AND GYNECOLOGY | Facility: CLINIC | Age: 38
End: 2018-01-17

## 2018-01-21 ENCOUNTER — PATIENT MESSAGE (OUTPATIENT)
Dept: OBSTETRICS AND GYNECOLOGY | Facility: CLINIC | Age: 38
End: 2018-01-21

## 2018-01-21 ENCOUNTER — TELEPHONE (OUTPATIENT)
Dept: OBSTETRICS AND GYNECOLOGY | Facility: CLINIC | Age: 38
End: 2018-01-21

## 2018-01-21 ENCOUNTER — PATIENT MESSAGE (OUTPATIENT)
Dept: ENDOCRINOLOGY | Facility: CLINIC | Age: 38
End: 2018-01-21

## 2018-01-21 DIAGNOSIS — E55.9 VITAMIN D INSUFFICIENCY: ICD-10-CM

## 2018-01-22 ENCOUNTER — HOSPITAL ENCOUNTER (OUTPATIENT)
Dept: RADIOLOGY | Facility: HOSPITAL | Age: 38
Discharge: HOME | End: 2018-01-22
Attending: ORTHOPAEDIC SURGERY
Payer: COMMERCIAL

## 2018-01-22 ENCOUNTER — OFFICE VISIT (OUTPATIENT)
Dept: ORTHOPEDICS | Facility: CLINIC | Age: 38
End: 2018-01-22
Payer: COMMERCIAL

## 2018-01-22 VITALS — HEIGHT: 63 IN | WEIGHT: 139.31 LBS | BODY MASS INDEX: 24.68 KG/M2

## 2018-01-22 DIAGNOSIS — M25.531 BILATERAL WRIST PAIN: Primary | ICD-10-CM

## 2018-01-22 DIAGNOSIS — M77.8 TENDONITIS OF RADIAL STYLOID: Primary | ICD-10-CM

## 2018-01-22 DIAGNOSIS — M25.531 BILATERAL WRIST PAIN: ICD-10-CM

## 2018-01-22 DIAGNOSIS — M25.532 BILATERAL WRIST PAIN: Primary | ICD-10-CM

## 2018-01-22 DIAGNOSIS — M25.532 BILATERAL WRIST PAIN: ICD-10-CM

## 2018-01-22 PROBLEM — E55.9 VITAMIN D INSUFFICIENCY: Status: ACTIVE | Noted: 2018-01-22

## 2018-01-22 PROCEDURE — 99999 PR PBB SHADOW E&M-EST. PATIENT-LVL III: CPT | Mod: PBBFAC,,, | Performed by: PHYSICIAN ASSISTANT

## 2018-01-22 PROCEDURE — 99203 OFFICE O/P NEW LOW 30 MIN: CPT | Mod: S$GLB,,, | Performed by: PHYSICIAN ASSISTANT

## 2018-01-22 PROCEDURE — 73110 X-RAY EXAM OF WRIST: CPT | Mod: 26,50,, | Performed by: RADIOLOGY

## 2018-01-22 PROCEDURE — 73110 X-RAY EXAM OF WRIST: CPT | Mod: 50,TC

## 2018-01-22 NOTE — LETTER
January 23, 2018      Gely Garcia MD  [address]  [facility] [address]           [facility]  [address]  [facility] [address]  Phone: [phone]  Fax: [phone]          Patient: Evelia Mishra   MR Number: [id number]   YOB: 1980   Date of Visit: 1/22/2018       Dear Dr. Gely Garcia:    Thank you for referring Evelia Mishra to me for evaluation. Attached you will find relevant portions of my assessment and plan of care.    If you have questions, please do not hesitate to call me. I look forward to following Evelia Mishra along with you.    Sincerely,    Rancho Jackson PA-C    Enclosure  CC:  No Recipients    If you would like to receive this communication electronically, please contact [email or web address] or [phone] to request more information on [employer] access.    For providers and/or their staff who would like to refer a patient to [facility], please contact us through our one-stop-shop provider referral line, [facility], at [phone].    If you feel you have received this communication in error or would no longer like to receive these types of communications, please e-mail [email or web address]

## 2018-01-23 ENCOUNTER — TELEPHONE (OUTPATIENT)
Dept: OBSTETRICS AND GYNECOLOGY | Facility: CLINIC | Age: 38
End: 2018-01-23

## 2018-01-23 NOTE — PROGRESS NOTES
SUBJECTIVE:     Chief Complaint & History of Present Illness:  Evelia Mishra is a New patient 37 y.o. female who is seen here today with a complaint of    Chief Complaint   Patient presents with    Right Wrist - Pain    Left Wrist - Pain    .  She is here today for evaluation and treatment of bilateral wrist and hand pain beginning approximately 1 month ago during the latter portion of her pregnancy.  He has delivered and is currently breast-feeding reports that the soreness and pain in the wrists has subsided to some degree but is still moderate and she does state that the pain is worse upon rising in the mornings and has difficulty with holding the babies while breast-feeding secondary to soreness in her wrists and forearms   On a scale of 1-10, with 10 being worst pain imaginable, he rates this pain as 2 on good days and 5 on bad days.  she describes the pain as tender and sore.    Review of patient's allergies indicates:  No Known Allergies      Current Outpatient Prescriptions   Medication Sig Dispense Refill    blood sugar diagnostic Strp 1 strip by Misc.(Non-Drug; Combo Route) route 5 (five) times daily. 200 strip 6    blood-glucose meter kit Use as instructed 1 each 1    cholecalciferol, vitamin D3, (VITAMIN D3) 2,000 unit Cap Take 1 capsule by mouth once daily.      lancets Misc 1 each by Misc.(Non-Drug; Combo Route) route 5 (five) times daily. 200 each 3    levothyroxine (SYNTHROID) 75 MCG tablet TAKE ONE TABLET (75 MCG TOTAL) BY MOUTH ONCE DAILY 30 tablet 2    PRENATAL VIT #76/IRON,CARB/FA (PNV 29-1 ORAL) Take 1 tablet by mouth once daily.       No current facility-administered medications for this visit.        Past Medical History:   Diagnosis Date    Hypothyroidism        History reviewed. No pertinent surgical history.    Vital Signs (Most Recent)  There were no vitals filed for this visit.        Review of Systems:  ROS:  Constitutional: no fever or chills  Eyes: no visual changes  ENT: no  "nasal congestion or sore throat  Respiratory: no cough or shortness of breath  Cardiovascular: no chest pain or palpitations  Gastrointestinal: no nausea or vomiting, tolerating diet  Genitourinary: no hematuria or dysuria  Integument/Breast: no rash or pruritis  Hematologic/Lymphatic: no easy bruising or lymphadenopathy  Musculoskeletal: no arthralgias or myalgias  Neurological: no seizures or tremors  Behavioral/Psych: no auditory or visual hallucinations  Endocrine: no heat or cold intolerance, Positive for hypothyroidism, gestational diabetes, vitamin D deficiency                OBJECTIVE:     PHYSICAL EXAM:  Height: 5' 3" (160 cm) Weight: 63.2 kg (139 lb 5.3 oz), General Appearance: Well nourished, well developed, in no acute distress.  Neurological: Mood & affect are normal.  bilateral wrist and forearm   History of injury: none  Pain: Description: moderate and intermittent  Night pain: occasional  Rest pain: no  Quality: stabbing and tender  Location: wrist, radial  Exacerbating factors: gripping and wrist position of flexion and radial deviation  Alleviating factors: ice, rest and acetaminophen  Radiates to: forearm  Neurological complaints: none  Mass/Swelling: none  Condition of skin:intact  Hand Exam: radial pulse normal, sensation normal, negative Phalen, negative Tinel, negative Cozen and tender to palpation at radial nerve   ROM:fingers flex to palm and thumb flexes to palm    Wrist Exam: palmar flexion 90/90, dorsiflexion 90/90, pronation 90/90, supination 90/90, flexion contracture 0/0, extension contracture 0/0, radial deviation 25/25, ulnar deviation 25/25      RADIOGRAPHS:  X-rays taken today films reviewed by me demonstrate no evidence of fracture dislocation or advanced degenerative joint disease in her about the hand or wrist joint spaces are well-maintained throughout    ASSESSMENT/PLAN:     Plan: We discussed with the patient at length all the different treatment options available for her " wrist, including anti-inflammatories, acetaminophen, rest, ice, physical therapy to include strengthening, range of motion exercise ultrasound, splinting,  occasional cortisone injections for temporary relief,  or possible surgical interventions.  Patient does not wish to pursue any prescription dose medications or injection therapies at this time she has not had any treatment to date for the wrist we will begin with conservative approach  Night splints to be worn bilaterally and when nursing  Acetaminophen every 4-6 hours not to exceed 2500 mg 24 hours  Ice to wrists when necessary  Follow-up if symptoms not improved

## 2018-01-24 NOTE — TELEPHONE ENCOUNTER
Mom called warmline. Stated that she returned the rental pump yesterday & has [employer] through insurance. Has been BR & FF. Pumps about 2x/24hrs & obtains about 1 1/2-2 oz. Breasts feel rangel now & mom states baby is BR more effectively with lots of voids/stools. Feeds about 4-6 feedings of formula in 24 hrs. Encouraged to pump few more times per day to increase milk production so that she is able to decrease supplement. Questions answered. Praise & reassurance provided. Encouraged frequent weight checks at ped's office as decrease amount of supplementation. Instructed to call for any needs. Verbalized understanding.

## 2018-01-29 ENCOUNTER — TELEPHONE (OUTPATIENT)
Dept: OBSTETRICS AND GYNECOLOGY | Facility: CLINIC | Age: 38
End: 2018-01-29

## 2018-01-29 ENCOUNTER — PATIENT MESSAGE (OUTPATIENT)
Dept: OBSTETRICS AND GYNECOLOGY | Facility: CLINIC | Age: 38
End: 2018-01-29

## 2018-02-07 ENCOUNTER — LAB VISIT (OUTPATIENT)
Dept: LAB | Facility: HOSPITAL | Age: 38
End: 2018-02-07
Attending: INTERNAL MEDICINE
Payer: COMMERCIAL

## 2018-02-07 DIAGNOSIS — O24.410 DIET CONTROLLED GESTATIONAL DIABETES MELLITUS (GDM) IN THIRD TRIMESTER: ICD-10-CM

## 2018-02-07 DIAGNOSIS — E55.9 VITAMIN D INSUFFICIENCY: ICD-10-CM

## 2018-02-07 DIAGNOSIS — E03.9 HYPOTHYROIDISM AFFECTING PREGNANCY IN THIRD TRIMESTER: ICD-10-CM

## 2018-02-07 DIAGNOSIS — O99.283 HYPOTHYROIDISM AFFECTING PREGNANCY IN THIRD TRIMESTER: ICD-10-CM

## 2018-02-07 LAB
25(OH)D3+25(OH)D2 SERPL-MCNC: 43 NG/ML
ESTIMATED AVG GLUCOSE: 97 MG/DL
HBA1C MFR BLD HPLC: 5 %
TSH SERPL DL<=0.005 MIU/L-ACNC: 0.91 UIU/ML

## 2018-02-07 PROCEDURE — 83036 HEMOGLOBIN GLYCOSYLATED A1C: CPT

## 2018-02-07 PROCEDURE — 82306 VITAMIN D 25 HYDROXY: CPT

## 2018-02-07 PROCEDURE — 84443 ASSAY THYROID STIM HORMONE: CPT

## 2018-02-07 PROCEDURE — 36415 COLL VENOUS BLD VENIPUNCTURE: CPT | Mod: PO

## 2018-02-14 ENCOUNTER — OFFICE VISIT (OUTPATIENT)
Dept: ENDOCRINOLOGY | Facility: CLINIC | Age: 38
End: 2018-02-14
Payer: COMMERCIAL

## 2018-02-14 ENCOUNTER — PATIENT MESSAGE (OUTPATIENT)
Dept: OBSTETRICS AND GYNECOLOGY | Facility: CLINIC | Age: 38
End: 2018-02-14

## 2018-02-14 VITALS
HEIGHT: 63 IN | DIASTOLIC BLOOD PRESSURE: 70 MMHG | SYSTOLIC BLOOD PRESSURE: 110 MMHG | WEIGHT: 138.44 LBS | BODY MASS INDEX: 24.53 KG/M2

## 2018-02-14 DIAGNOSIS — E03.8 SUBCLINICAL HYPOTHYROIDISM: ICD-10-CM

## 2018-02-14 DIAGNOSIS — E55.9 VITAMIN D INSUFFICIENCY: ICD-10-CM

## 2018-02-14 DIAGNOSIS — E03.9 HYPOTHYROIDISM (ACQUIRED): Primary | ICD-10-CM

## 2018-02-14 DIAGNOSIS — Z86.32 HISTORY OF GESTATIONAL DIABETES: ICD-10-CM

## 2018-02-14 PROBLEM — O99.280 HYPOTHYROIDISM AFFECTING PREGNANCY: Status: RESOLVED | Noted: 2017-07-25 | Resolved: 2018-02-14

## 2018-02-14 PROBLEM — O24.419 GESTATIONAL DIABETES MELLITUS (GDM) IN THIRD TRIMESTER: Status: RESOLVED | Noted: 2017-11-11 | Resolved: 2018-02-14

## 2018-02-14 PROCEDURE — 3008F BODY MASS INDEX DOCD: CPT | Mod: S$GLB,,, | Performed by: INTERNAL MEDICINE

## 2018-02-14 PROCEDURE — 99214 OFFICE O/P EST MOD 30 MIN: CPT | Mod: S$GLB,,, | Performed by: INTERNAL MEDICINE

## 2018-02-14 PROCEDURE — 99999 PR PBB SHADOW E&M-EST. PATIENT-LVL III: CPT | Mod: PBBFAC,,, | Performed by: INTERNAL MEDICINE

## 2018-02-14 RX ORDER — LEVOTHYROXINE SODIUM 75 UG/1
TABLET ORAL
Qty: 90 TABLET | Refills: 1 | Status: SHIPPED | OUTPATIENT
Start: 2018-02-14 | End: 2018-03-05

## 2018-02-14 NOTE — PROGRESS NOTES
Subjective:      Patient ID: Evelia Mishra is a 37 y.o. female.    Chief Complaint:  Hypothyroidism and Gestational Diabetes      History of Present Illness   Danica presents today for follow up of subclinical hypothyroidism and gestational diabetes. Last seen 11/2017.     Has active history of  Subclinical hypothyroidism on thyroid hormone and gestational diabetes.     Delivered healthy baby on 1/8/2018.      Was found to have positive 1 hour OGTT. Then had further testing with 3 hour OGTT which showed an elevated glucose at 1 and 2 hours post ingestion.   She delivered her first child 11 years ago and did not have gestational DM with that pregnancy.     Has strong family history of type 2 diabetes.     Glucoses remained at goal during the pregnancy with dietary modification only. Post partum A1c found to be 5.0%. She continues to watch what she eats and eat less carbs as she understands she is at higher risk for DMII given GDM and family history.     Was started on thyroid hormone in 3/2017 for subclinical hypothyroidism by fertility specialist for slightly elevated TSH. Was started on 75 mcg and dose did not need to be adjusted during pregnancy. Still taking 75 mcg.  No palpitations, tremors or heat intolerance. BM's normal.   No overt fatigue. No excessive hair fall. Some mild dry skin.   Weight stable post pregnancy    Planning for another possible pregnancy soon so she would like to continue the thyroid hormone at this time.     Taking Vit D 2000 units daily for Vit D deficiency.     Review of Systems   Constitutional: Negative for chills and fever.   Gastrointestinal: Negative for nausea.   No CP  No SOB    Objective:   Physical Exam   Nursing note and vitals reviewed.  No thyromegaly  DTR's 2 +  No tremor  No edema    Lab Review:   Results for EVELIA MISHRA (MRN [id number]) as of 2/14/2018 07:18   Ref. Range 2/7/2018 08:03   Vit D, 25-Hydroxy Latest Ref Range: 30 - 96 ng/mL 43   Hemoglobin A1C Latest Ref  Range: 4.0 - 5.6 % 5.0   Estimated Avg Glucose Latest Ref Range: 68 - 131 mg/dL 97   TSH Latest Ref Range: 0.400 - 4.000 uIU/mL 0.906       Assessment:     1. Subclinical hypothyroidism    2. History of gestational diabetes    3. Vitamin D insufficiency      Plan:     1.) Patient with subclinical hypothyroidism  --Euthyroid clinically and biochemically  --Will continue levothyroxine 75 mcg PO daily for now  --She desires to stay on thyroid hormone as she is considering another pregnancy in the near future  --Goal will be to keep TSH <2.5  --Will repeat TPO now to assess future risk of overt hypothyroidism  --Repeat TSH in 4 weeks to ensure she is not hyperthyroid    2.) She will continue with dietary modification and watch carb content  --A1c WNL  --Will need to be monitored closely if she has another pregnancy    3.) Continue D3 2000 units daily    TSH in 4 weeks, RTC in 6 months with labs prior    Bandar Frye M.D. Staff Endocrinology

## 2018-02-20 ENCOUNTER — POSTPARTUM VISIT (OUTPATIENT)
Dept: OBSTETRICS AND GYNECOLOGY | Facility: CLINIC | Age: 38
End: 2018-02-20
Payer: COMMERCIAL

## 2018-02-20 VITALS
BODY MASS INDEX: 24.24 KG/M2 | WEIGHT: 136.81 LBS | HEIGHT: 63 IN | DIASTOLIC BLOOD PRESSURE: 60 MMHG | SYSTOLIC BLOOD PRESSURE: 100 MMHG

## 2018-02-20 DIAGNOSIS — O24.419 GESTATIONAL DIABETES MELLITUS (GDM), ANTEPARTUM, GESTATIONAL DIABETES METHOD OF CONTROL UNSPECIFIED: ICD-10-CM

## 2018-02-20 PROCEDURE — 0503F POSTPARTUM CARE VISIT: CPT | Mod: S$GLB,,, | Performed by: OBSTETRICS & GYNECOLOGY

## 2018-02-20 PROCEDURE — 99999 PR PBB SHADOW E&M-EST. PATIENT-LVL II: CPT | Mod: PBBFAC,,, | Performed by: OBSTETRICS & GYNECOLOGY

## 2018-02-20 RX ORDER — LANCETS 28 GAUGE
EACH MISCELLANEOUS
COMMUNITY
Start: 2017-11-14 | End: 2019-02-26

## 2018-02-20 RX ORDER — ACETAMINOPHEN AND CODEINE PHOSPHATE 120; 12 MG/5ML; MG/5ML
1 SOLUTION ORAL DAILY
Qty: 84 TABLET | Refills: 3 | Status: SHIPPED | OUTPATIENT
Start: 2018-02-20 | End: 2018-07-01

## 2018-02-20 NOTE — PROGRESS NOTES
"Evelia Mishra is a 37 y.o. female  presents for a postpartum visit.  She is status post  6weeks ago.  Her hospitalization was not complicated.  She is breastfeeding.  She desires oral progesterone-only contraceptive for contraception.  She denies postpartum depression.  NEEDS 2-HR SCREENING BY 12 WEEKS DUE TO GDM.  PLANNING TRANSFER OF LAST EMBRYO IN ABOUT A YEAR    MALE WITH REPAIR SM SECOND DEGREE AND VAG WALL LAC  Her last pap was   "HX T2 TWIN LOSS, D&C PLAC X 2, ASHERMANS, OP HYSTEROSC. 34W BREECH- -   PT, HUSB HOSPITALISTS [facility]-NO; OFF WORK THRU PREG. ITS A BOY, --GBS+  A POS.  HYPOTHYR,  AMA - - 34W5D 58TH%ILE, F/U 38W EFW - -GDM - -"    Past Medical History:   Diagnosis Date    Gestational diabetes mellitus     Hypothyroidism      History reviewed. No pertinent surgical history.  Review of patient's allergies indicates:  No Known Allergies    Current Outpatient Prescriptions:     blood sugar diagnostic Strp, 1 strip by Misc.(Non-Drug; Combo Route) route 5 (five) times daily., Disp: 200 strip, Rfl: 6    blood-glucose meter kit, Use as instructed, Disp: 1 each, Rfl: 1    cholecalciferol, vitamin D3, (VITAMIN D3) 2,000 unit Cap, Take 1 capsule by mouth once daily., Disp: , Rfl:     FREESTYLE LANCETS 28 gauge lancets, , Disp: , Rfl:     lancets Misc, 1 each by Misc.(Non-Drug; Combo Route) route 5 (five) times daily., Disp: 200 each, Rfl: 3    levothyroxine (SYNTHROID) 75 MCG tablet, TAKE ONE TABLET (75 MCG TOTAL) BY MOUTH ONCE DAILY, Disp: 90 tablet, Rfl: 1    PRENATAL VIT #76/IRON,CARB/FA (PNV 29-1 ORAL), Take 1 tablet by mouth once daily., Disp: , Rfl:       Vitals:    18 0940   BP: 100/60       ABDOMEN: Soft. No tenderness or masses. No hepatosplenomegaly. No hernias.  BREASTS: exam deferred  PELVIC: External female genitalia without lesions, well-healed.  Female hair distribution. Adequate perineal body without evident defect, Normal urethral meatus. Vagina moist and " well rugated without lesions or discharge. Cervix pink without lesions, discharge or tenderness. Uterus is 4-6 week size, regular, mobile and nontender. Adnexa without masses or tenderness.    Assessment:  Normal postpartum exam    Plan:  Routine follow up.

## 2018-03-04 ENCOUNTER — PATIENT MESSAGE (OUTPATIENT)
Dept: OBSTETRICS AND GYNECOLOGY | Facility: CLINIC | Age: 38
End: 2018-03-04

## 2018-03-04 ENCOUNTER — PATIENT MESSAGE (OUTPATIENT)
Dept: ENDOCRINOLOGY | Facility: CLINIC | Age: 38
End: 2018-03-04

## 2018-03-04 DIAGNOSIS — E03.8 SUBCLINICAL HYPOTHYROIDISM: Primary | ICD-10-CM

## 2018-03-05 RX ORDER — LEVOTHYROXINE SODIUM 50 UG/1
50 TABLET ORAL DAILY
Qty: 30 TABLET | Refills: 11 | Status: SHIPPED | OUTPATIENT
Start: 2018-03-05 | End: 2018-03-09 | Stop reason: SDUPTHER

## 2018-03-06 ENCOUNTER — PATIENT MESSAGE (OUTPATIENT)
Dept: ENDOCRINOLOGY | Facility: CLINIC | Age: 38
End: 2018-03-06

## 2018-03-09 ENCOUNTER — PATIENT MESSAGE (OUTPATIENT)
Dept: ENDOCRINOLOGY | Facility: CLINIC | Age: 38
End: 2018-03-09

## 2018-03-09 RX ORDER — LEVOTHYROXINE SODIUM 50 UG/1
50 TABLET ORAL DAILY
Qty: 30 TABLET | Refills: 11 | Status: SHIPPED | OUTPATIENT
Start: 2018-03-09 | End: 2018-08-22

## 2018-03-14 ENCOUNTER — PATIENT MESSAGE (OUTPATIENT)
Dept: ENDOCRINOLOGY | Facility: CLINIC | Age: 38
End: 2018-03-14

## 2018-03-14 ENCOUNTER — LAB VISIT (OUTPATIENT)
Dept: LAB | Facility: HOSPITAL | Age: 38
End: 2018-03-14
Attending: INTERNAL MEDICINE
Payer: COMMERCIAL

## 2018-03-14 DIAGNOSIS — E03.8 SUBCLINICAL HYPOTHYROIDISM: Primary | ICD-10-CM

## 2018-03-14 DIAGNOSIS — O24.419 GESTATIONAL DIABETES MELLITUS (GDM), ANTEPARTUM, GESTATIONAL DIABETES METHOD OF CONTROL UNSPECIFIED: ICD-10-CM

## 2018-03-14 DIAGNOSIS — E03.9 HYPOTHYROIDISM (ACQUIRED): ICD-10-CM

## 2018-03-14 LAB
GLUCOSE SERPL-MCNC: 102 MG/DL
GLUCOSE SERPL-MCNC: 110 MG/DL
GLUCOSE SERPL-MCNC: 90 MG/DL
T4 FREE SERPL-MCNC: 0.87 NG/DL
THYROPEROXIDASE IGG SERPL-ACNC: <6 IU/ML
TSH SERPL DL<=0.005 MIU/L-ACNC: 3.04 UIU/ML

## 2018-03-14 PROCEDURE — 82951 GLUCOSE TOLERANCE TEST (GTT): CPT

## 2018-03-14 PROCEDURE — 84439 ASSAY OF FREE THYROXINE: CPT

## 2018-03-14 PROCEDURE — 84443 ASSAY THYROID STIM HORMONE: CPT

## 2018-03-14 PROCEDURE — 86376 MICROSOMAL ANTIBODY EACH: CPT

## 2018-03-14 PROCEDURE — 36415 COLL VENOUS BLD VENIPUNCTURE: CPT

## 2018-04-07 ENCOUNTER — PATIENT MESSAGE (OUTPATIENT)
Dept: OBSTETRICS AND GYNECOLOGY | Facility: CLINIC | Age: 38
End: 2018-04-07

## 2018-04-11 ENCOUNTER — OFFICE VISIT (OUTPATIENT)
Dept: ORTHOPEDICS | Facility: CLINIC | Age: 38
End: 2018-04-11
Attending: ORTHOPAEDIC SURGERY
Payer: COMMERCIAL

## 2018-04-11 VITALS — BODY MASS INDEX: 24.1 KG/M2 | HEIGHT: 63 IN | WEIGHT: 136 LBS

## 2018-04-11 DIAGNOSIS — M65.4 DE QUERVAIN'S TENOSYNOVITIS, BILATERAL: ICD-10-CM

## 2018-04-11 PROCEDURE — 20550 NJX 1 TENDON SHEATH/LIGAMENT: CPT | Mod: 50,S$GLB,, | Performed by: ORTHOPAEDIC SURGERY

## 2018-04-11 PROCEDURE — 99203 OFFICE O/P NEW LOW 30 MIN: CPT | Mod: 25,S$GLB,, | Performed by: ORTHOPAEDIC SURGERY

## 2018-04-11 PROCEDURE — 99999 PR PBB SHADOW E&M-EST. PATIENT-LVL II: CPT | Mod: PBBFAC,,, | Performed by: ORTHOPAEDIC SURGERY

## 2018-04-11 RX ORDER — TRIAMCINOLONE ACETONIDE 40 MG/ML
40 INJECTION, SUSPENSION INTRA-ARTICULAR; INTRAMUSCULAR
Status: COMPLETED | OUTPATIENT
Start: 2018-04-11 | End: 2018-04-11

## 2018-04-11 RX ADMIN — TRIAMCINOLONE ACETONIDE 40 MG: 40 INJECTION, SUSPENSION INTRA-ARTICULAR; INTRAMUSCULAR at 04:04

## 2018-04-11 NOTE — PROGRESS NOTES
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  No numbness or tingling is reported.  No trauma reported.  She   does have pain at the base of each thumb bilaterally and symptoms are worse with   gripping.  She has had quite a bit of pain and has tried the naproxen without   much improvement.    PAST MEDICAL HISTORY:  Significant for gestational diabetes and hypothyroidism.    PAST SURGICAL HISTORY:  None.    FAMILY HISTORY:  Positive for hyperlipidemia, cataracts and diabetes.    SOCIAL HISTORY:  The patient does not smoke or drink.    REVIEW OF SYSTEMS:  Negative fever, chills, rashes.    CURRENT MEDICATIONS:  Reviewed on chart.    ALLERGIES:  None.    PHYSICAL EXAMINATION:  GENERAL:  Well-developed, well-nourished female in no acute distress, alert and   oriented x3.  EXTREMITIES:  Examination of the upper extremities significant for the hands,   demonstrating tenderness over the first dorsal compartment of each wrist.    Positive Finkelstein test bilaterally.  Tinel sign negative.  No tenderness over   the CMC joint.   strength slightly decreased both hands.    X-RAYS:  AP and lateral, bilateral wrist, demonstrate no significant   abnormalities.    IMPRESSION:  Bilateral de Quervain's tenosynovitis.    PLAN:  I explained the nature of problem to the patient.  Recommended injections   today.  She was in agreement.  After pause for timeout, she identified each   wrist injected the first dorsal compartment bilaterally with combination of   Kenalog 10 mg, 0.5 mL Xylocaine, sterile technique.  She tolerated the procedure   well without complication.    I have recommended ice to the area, cautioned her about possible skin   discoloration and given wrist splints for both hands.    I would like her to use the splints whenever she is holding the baby or do any   lifting activities.  Follow  up in 3-4 weeks for recheck.      DMITRY/YOHAN  dd: 04/11/2018 15:49:12 (CDT)  td: 04/12/2018 12:06:04 (CDT)  Doc ID   #[id number]  Job ID #[id number]    CC:

## 2018-04-11 NOTE — LETTER
April 11, 2018        Gely Garcia MD  [address]  [address], [address]  [facility] [address]             [facility]  [address], [address]  [facility] [address]  Phone: [phone]   Patient: Evelia Mishra   MR Number: [id number]   YOB: 1980   Date of Visit: 4/11/2018       Dear Dr. Garcia:    Thank you for referring Evelia Mishra to me for evaluation. Below are the relevant portions of my assessment and plan of care.            If you have questions, please do not hesitate to call me. I look forward to following Evelia along with you.    Sincerely,      Mehrdad Boswell Jr., MD           CC  No Recipients

## 2018-04-21 ENCOUNTER — PATIENT MESSAGE (OUTPATIENT)
Dept: ENDOCRINOLOGY | Facility: CLINIC | Age: 38
End: 2018-04-21

## 2018-04-25 ENCOUNTER — PATIENT MESSAGE (OUTPATIENT)
Dept: ENDOCRINOLOGY | Facility: CLINIC | Age: 38
End: 2018-04-25

## 2018-04-25 ENCOUNTER — OFFICE VISIT (OUTPATIENT)
Dept: PODIATRY | Facility: CLINIC | Age: 38
End: 2018-04-25
Payer: COMMERCIAL

## 2018-04-25 VITALS — BODY MASS INDEX: 23.74 KG/M2 | WEIGHT: 134 LBS | HEIGHT: 63 IN

## 2018-04-25 DIAGNOSIS — M79.672 FOOT PAIN, BILATERAL: ICD-10-CM

## 2018-04-25 DIAGNOSIS — E03.8 SUBCLINICAL HYPOTHYROIDISM: Primary | ICD-10-CM

## 2018-04-25 DIAGNOSIS — Q82.8 POROKERATOSIS: Primary | ICD-10-CM

## 2018-04-25 DIAGNOSIS — M24.573 EQUINUS CONTRACTURE OF ANKLE: ICD-10-CM

## 2018-04-25 DIAGNOSIS — M77.9 CAPSULITIS: ICD-10-CM

## 2018-04-25 DIAGNOSIS — M79.671 FOOT PAIN, BILATERAL: ICD-10-CM

## 2018-04-25 PROCEDURE — 99999 PR PBB SHADOW E&M-EST. PATIENT-LVL III: CPT | Mod: PBBFAC,,, | Performed by: PODIATRIST

## 2018-04-25 PROCEDURE — 99203 OFFICE O/P NEW LOW 30 MIN: CPT | Mod: 25,S$GLB,, | Performed by: PODIATRIST

## 2018-04-25 PROCEDURE — 29540 STRAPPING ANKLE &/FOOT: CPT | Mod: 50,S$GLB,, | Performed by: PODIATRIST

## 2018-04-25 PROCEDURE — 17999 UNLISTD PX SKN MUC MEMB SUBQ: CPT | Mod: CSM,,, | Performed by: PODIATRIST

## 2018-04-25 RX ORDER — AMMONIUM LACTATE 12 G/100G
CREAM TOPICAL
Qty: 140 G | Refills: 11 | Status: SHIPPED | OUTPATIENT
Start: 2018-04-25 | End: 2019-02-26

## 2018-04-25 NOTE — PROGRESS NOTES
Subjective:      Patient ID: Evelia Mishra is a 37 y.o. female.    Chief Complaint: Foot Pain (left)    Sharp deep pain forefoot left more than right with skin lesions associated.  Gradual onset, worsening over past several weeks, aggravated by increased weight bearing, shoe gear, pressure.  No previous medical treatment.  No self treatment.  Denies trauma, surgery both feet.    Review of Systems   Constitution: Negative for chills, diaphoresis, fever, malaise/fatigue and night sweats.   Cardiovascular: Negative for claudication, cyanosis, leg swelling and syncope.   Skin: Positive for suspicious lesions. Negative for color change, dry skin, nail changes, rash and unusual hair distribution.   Musculoskeletal: Positive for joint pain. Negative for falls, joint swelling, muscle cramps, muscle weakness and stiffness.   Gastrointestinal: Negative for constipation, diarrhea, nausea and vomiting.   Neurological: Negative for brief paralysis, disturbances in coordination, focal weakness, numbness, paresthesias, sensory change and tremors.           Objective:      Physical Exam   Constitutional: She is oriented to person, place, and time. She appears well-developed and well-nourished. She is cooperative. No distress.   Cardiovascular:   Pulses:       Popliteal pulses are 2+ on the right side, and 2+ on the left side.        Dorsalis pedis pulses are 2+ on the right side, and 2+ on the left side.        Posterior tibial pulses are 2+ on the right side, and 2+ on the left side.   Capillary refill 3 seconds all toes/distal feet, all toes/both feet warm to touch.      Negative lymphadenopathy bilateral popliteal fossa and tarsal tunnel.      Negavie lower extremity edema bilateral.     Musculoskeletal:        Right ankle: She exhibits normal range of motion, no swelling, no ecchymosis, no deformity, no laceration and normal pulse. Achilles tendon normal. Achilles tendon exhibits no pain, no defect and normal Mejia's test  results.   Pain to palpation inferior mtpj 2-5 bilateral, worse at left foot and beneath skin lesions without evidence of trauma or infection.    Ankle dorsiflexion decreased at <10 degrees bilateral with moderate increase with knee flexion bilateral.    Otherwise, Normal angle, base, station of gait. All ten toes without clubbing, cyanosis, or signs of ischemia.  No pain to palpation bilateral lower extremities.  Range of motion, stability, muscle strength, and muscle tone normal bilateral feet and legs.     Lymphadenopathy:   Negative lymphadenopathy bilateral popliteal fossa and tarsal tunnel.    Negative lymphangitic streaking bilateral feet/ankles/legs.   Neurological: She is alert and oriented to person, place, and time. She has normal strength. She displays no atrophy and no tremor. No sensory deficit. She exhibits normal muscle tone. Gait normal.   Reflex Scores:       Patellar reflexes are 2+ on the right side and 2+ on the left side.       Achilles reflexes are 2+ on the right side and 2+ on the left side.  Negative tinel sign to percussion sural, superficial peroneal, deep peroneal, saphenous, and posterior tibial nerves right and left ankles and feet.      Negative moulder sign/click bilateral all intermetatarsal spaces.     Skin: Skin is warm, dry and intact. Capillary refill takes 2 to 3 seconds. No abrasion, no bruising, no burn, no ecchymosis, no laceration, no lesion and no rash noted. She is not diaphoretic. No cyanosis or erythema. No pallor. Nails show no clubbing.     Focal hyperkeratotic lesion consisting entirely of hyperkeratotic tissue with central core without open skin, drainage, pus, fluctuance, malodor, or signs of infection plantar mtpj 3 right and 2,5 left.    Otherwise, Skin is normal age and health appropriate color, turgor, texture, and temperature bilateral lower extremities without ulceration, hyperpigmentation, discoloration, masses nodules or cords palpated.  No ecchymosis,  erythema, edema, or cardinal signs of infection bilateral lower extremities.     Psychiatric: She has a normal mood and affect.             Assessment:       Encounter Diagnoses   Name Primary?    Porokeratosis Yes    Foot pain, bilateral     Capsulitis     Equinus contracture of ankle          Plan:       Evelia was seen today for foot pain.    Diagnoses and all orders for this visit:    Porokeratosis    Foot pain, bilateral  -     X-Ray Foot Complete Bilateral; Future    Capsulitis  -     X-Ray Foot Complete Bilateral; Future    Equinus contracture of ankle  -     X-Ray Foot Complete Bilateral; Future    Other orders  -     ammonium lactate 12 % Crea; Apply twice daily to affected parts both feet as needed.      I counseled the patient on her conditions, their implications and medical management.        Patient will stretch the tendo achilles complex three times daily as demonstrated in the office.  Literature was dispensed illustrating proper stretching technique.    I applied a plantar rest strapping to the patient's foot to offload symptomatic area, support the arch, and relieve pain.    Patient will obtain over the counter arch supports and wear them in shoes whenever possible.  Athletic shoes intended for walking or running are usually best.    Discussed conservative treatment with shoes of adequate dimensions, material, and style to alleviate symptoms and delay or prevent surgical intervention.    Rx xrays, lac hydrin.    Non covered foot care:    With the patient's permission, I debrided hyperkeratotic lesion(s) as above totaling     3           to, not  Including dermis with sterile #15 blade.  Patient tolerated the procedure well and related significant relief.              Follow-up in about 1 month (around 5/25/2018).

## 2018-04-27 ENCOUNTER — PATIENT MESSAGE (OUTPATIENT)
Dept: PODIATRY | Facility: CLINIC | Age: 38
End: 2018-04-27

## 2018-04-28 ENCOUNTER — LAB VISIT (OUTPATIENT)
Dept: LAB | Facility: HOSPITAL | Age: 38
End: 2018-04-28
Attending: INTERNAL MEDICINE
Payer: COMMERCIAL

## 2018-04-28 DIAGNOSIS — E03.8 SUBCLINICAL HYPOTHYROIDISM: ICD-10-CM

## 2018-04-28 LAB — TSH SERPL DL<=0.005 MIU/L-ACNC: 2.8 UIU/ML

## 2018-04-28 PROCEDURE — 36415 COLL VENOUS BLD VENIPUNCTURE: CPT

## 2018-04-28 PROCEDURE — 84443 ASSAY THYROID STIM HORMONE: CPT

## 2018-04-30 ENCOUNTER — HOSPITAL ENCOUNTER (OUTPATIENT)
Dept: RADIOLOGY | Facility: HOSPITAL | Age: 38
Discharge: HOME | End: 2018-04-30
Attending: PODIATRIST
Payer: COMMERCIAL

## 2018-04-30 DIAGNOSIS — M79.672 FOOT PAIN, BILATERAL: ICD-10-CM

## 2018-04-30 DIAGNOSIS — M24.573 EQUINUS CONTRACTURE OF ANKLE: ICD-10-CM

## 2018-04-30 DIAGNOSIS — M77.9 CAPSULITIS: ICD-10-CM

## 2018-04-30 DIAGNOSIS — M79.671 FOOT PAIN, BILATERAL: ICD-10-CM

## 2018-04-30 PROCEDURE — 73630 X-RAY EXAM OF FOOT: CPT | Mod: 26,50,, | Performed by: RADIOLOGY

## 2018-04-30 PROCEDURE — 73630 X-RAY EXAM OF FOOT: CPT | Mod: 50,TC

## 2018-05-01 ENCOUNTER — PATIENT MESSAGE (OUTPATIENT)
Dept: ENDOCRINOLOGY | Facility: CLINIC | Age: 38
End: 2018-05-01

## 2018-05-02 ENCOUNTER — PATIENT MESSAGE (OUTPATIENT)
Dept: ENDOCRINOLOGY | Facility: CLINIC | Age: 38
End: 2018-05-02

## 2018-06-05 ENCOUNTER — PATIENT MESSAGE (OUTPATIENT)
Dept: OBSTETRICS AND GYNECOLOGY | Facility: CLINIC | Age: 38
End: 2018-06-05

## 2018-06-29 ENCOUNTER — PATIENT MESSAGE (OUTPATIENT)
Dept: OBSTETRICS AND GYNECOLOGY | Facility: CLINIC | Age: 38
End: 2018-06-29

## 2018-07-01 RX ORDER — NORETHINDRONE ACETATE AND ETHINYL ESTRADIOL, AND FERROUS FUMARATE 1MG-20(24)
1 KIT ORAL DAILY
Qty: 84 CAPSULE | Refills: 2 | Status: SHIPPED | OUTPATIENT
Start: 2018-07-01 | End: 2018-07-03 | Stop reason: SDUPTHER

## 2018-07-02 ENCOUNTER — PATIENT MESSAGE (OUTPATIENT)
Dept: OBSTETRICS AND GYNECOLOGY | Facility: CLINIC | Age: 38
End: 2018-07-02

## 2018-07-03 DIAGNOSIS — Z30.011 ENCOUNTER FOR INITIAL PRESCRIPTION OF CONTRACEPTIVE PILLS: Primary | ICD-10-CM

## 2018-07-03 RX ORDER — NORETHINDRONE ACETATE AND ETHINYL ESTRADIOL, AND FERROUS FUMARATE 1MG-20(24)
1 KIT ORAL DAILY
Qty: 84 CAPSULE | Refills: 2 | Status: SHIPPED | OUTPATIENT
Start: 2018-07-03 | End: 2019-02-26

## 2018-07-08 ENCOUNTER — PATIENT MESSAGE (OUTPATIENT)
Dept: ENDOCRINOLOGY | Facility: CLINIC | Age: 38
End: 2018-07-08

## 2018-08-06 ENCOUNTER — PATIENT MESSAGE (OUTPATIENT)
Dept: OBSTETRICS AND GYNECOLOGY | Facility: CLINIC | Age: 38
End: 2018-08-06

## 2018-08-13 ENCOUNTER — TELEPHONE (OUTPATIENT)
Dept: INTERNAL MEDICINE | Facility: CLINIC | Age: 38
End: 2018-08-13

## 2018-08-13 NOTE — TELEPHONE ENCOUNTER
Left a message for the patient to call the office back.  To schedule annual physical     Please Advise  Thank You

## 2018-08-13 NOTE — TELEPHONE ENCOUNTER
----- Message from Rosalie Brown sent at 8/13/2018  3:11 PM CDT -----  Contact: PT Portal Request  Appointment Request From: Evelia Mishra    [facility]    Preferred Date Range: 8/21/2018 - 9/30/2018    Preferred Times: Any time    Reason for visit: Existing Patient    Comments:  Yearly annual check up.

## 2018-08-22 ENCOUNTER — ANESTHESIA (OUTPATIENT)
Dept: SURGERY | Facility: HOSPITAL | Age: 38
End: 2018-08-22
Payer: COMMERCIAL

## 2018-08-22 ENCOUNTER — ANESTHESIA EVENT (OUTPATIENT)
Dept: SURGERY | Facility: HOSPITAL | Age: 38
End: 2018-08-22
Payer: COMMERCIAL

## 2018-08-22 ENCOUNTER — HOSPITAL ENCOUNTER (OUTPATIENT)
Facility: HOSPITAL | Age: 38
Discharge: HOME | End: 2018-08-23
Attending: EMERGENCY MEDICINE | Admitting: OBSTETRICS & GYNECOLOGY
Payer: COMMERCIAL

## 2018-08-22 VITALS — DIASTOLIC BLOOD PRESSURE: 71 MMHG | OXYGEN SATURATION: 100 % | HEART RATE: 111 BPM | SYSTOLIC BLOOD PRESSURE: 129 MMHG

## 2018-08-22 DIAGNOSIS — R10.32 LLQ PAIN: ICD-10-CM

## 2018-08-22 DIAGNOSIS — O00.90 ECTOPIC PREGNANCY WITHOUT INTRAUTERINE PREGNANCY, UNSPECIFIED LOCATION: Primary | ICD-10-CM

## 2018-08-22 DIAGNOSIS — O00.102 LEFT TUBAL PREGNANCY WITHOUT INTRAUTERINE PREGNANCY: ICD-10-CM

## 2018-08-22 LAB
ALBUMIN SERPL BCP-MCNC: 3.9 G/DL
ALP SERPL-CCNC: 70 U/L
ALT SERPL W/O P-5'-P-CCNC: 32 U/L
ANION GAP SERPL CALC-SCNC: 8 MMOL/L
AST SERPL-CCNC: 18 U/L
B-HCG UR QL: POSITIVE
BASOPHILS # BLD AUTO: 0.01 K/UL
BASOPHILS NFR BLD: 0.1 %
BILIRUB SERPL-MCNC: 0.7 MG/DL
BILIRUB UR QL STRIP: NEGATIVE
BUN SERPL-MCNC: 10 MG/DL
CALCIUM SERPL-MCNC: 9.3 MG/DL
CHLORIDE SERPL-SCNC: 104 MMOL/L
CLARITY UR: CLEAR
CO2 SERPL-SCNC: 24 MMOL/L
COLOR UR: YELLOW
CREAT SERPL-MCNC: 0.7 MG/DL
CTP QC/QA: YES
DIFFERENTIAL METHOD: ABNORMAL
EOSINOPHIL # BLD AUTO: 0 K/UL
EOSINOPHIL NFR BLD: 0.2 %
ERYTHROCYTE [DISTWIDTH] IN BLOOD BY AUTOMATED COUNT: 12.5 %
EST. GFR  (AFRICAN AMERICAN): >60 ML/MIN/1.73 M^2
EST. GFR  (NON AFRICAN AMERICAN): >60 ML/MIN/1.73 M^2
GLUCOSE SERPL-MCNC: 93 MG/DL
GLUCOSE UR QL STRIP: NEGATIVE
HCG INTACT+B SERPL-ACNC: 7709 MIU/ML
HCT VFR BLD AUTO: 34.1 %
HGB BLD-MCNC: 11.5 G/DL
HGB UR QL STRIP: NEGATIVE
KETONES UR QL STRIP: NEGATIVE
LEUKOCYTE ESTERASE UR QL STRIP: NEGATIVE
LYMPHOCYTES # BLD AUTO: 1.8 K/UL
LYMPHOCYTES NFR BLD: 20.5 %
MCH RBC QN AUTO: 30.9 PG
MCHC RBC AUTO-ENTMCNC: 33.7 G/DL
MCV RBC AUTO: 92 FL
MONOCYTES # BLD AUTO: 0.3 K/UL
MONOCYTES NFR BLD: 3.6 %
NEUTROPHILS # BLD AUTO: 6.7 K/UL
NEUTROPHILS NFR BLD: 75.4 %
NITRITE UR QL STRIP: NEGATIVE
PH UR STRIP: 6 [PH] (ref 5–8)
PLATELET # BLD AUTO: 337 K/UL
PMV BLD AUTO: 9.6 FL
POTASSIUM SERPL-SCNC: 3.8 MMOL/L
PROT SERPL-MCNC: 8 G/DL
PROT UR QL STRIP: NEGATIVE
RBC # BLD AUTO: 3.72 M/UL
SODIUM SERPL-SCNC: 136 MMOL/L
SP GR UR STRIP: 1.02 (ref 1–1.03)
URN SPEC COLLECT METH UR: NORMAL
UROBILINOGEN UR STRIP-ACNC: NEGATIVE EU/DL
WBC # BLD AUTO: 8.88 K/UL

## 2018-08-22 PROCEDURE — 94761 N-INVAS EAR/PLS OXIMETRY MLT: CPT

## 2018-08-22 PROCEDURE — G0378 HOSPITAL OBSERVATION PER HR: HCPCS

## 2018-08-22 PROCEDURE — 25000003 PHARM REV CODE 250: Performed by: OBSTETRICS & GYNECOLOGY

## 2018-08-22 PROCEDURE — 25000003 PHARM REV CODE 250: Performed by: PHYSICIAN ASSISTANT

## 2018-08-22 PROCEDURE — 59151 TREAT ECTOPIC PREGNANCY: CPT | Mod: ,,, | Performed by: OBSTETRICS & GYNECOLOGY

## 2018-08-22 PROCEDURE — 27201423 OPTIME MED/SURG SUP & DEVICES STERILE SUPPLY: Performed by: OBSTETRICS & GYNECOLOGY

## 2018-08-22 PROCEDURE — 71000039 HC RECOVERY, EACH ADD'L HOUR: Performed by: OBSTETRICS & GYNECOLOGY

## 2018-08-22 PROCEDURE — 37000008 HC ANESTHESIA 1ST 15 MINUTES: Performed by: OBSTETRICS & GYNECOLOGY

## 2018-08-22 PROCEDURE — 80053 COMPREHEN METABOLIC PANEL: CPT

## 2018-08-22 PROCEDURE — 88305 TISSUE EXAM BY PATHOLOGIST: CPT | Mod: 26,,, | Performed by: PATHOLOGY

## 2018-08-22 PROCEDURE — 37000009 HC ANESTHESIA EA ADD 15 MINS: Performed by: OBSTETRICS & GYNECOLOGY

## 2018-08-22 PROCEDURE — 25000003 PHARM REV CODE 250: Performed by: NURSE ANESTHETIST, CERTIFIED REGISTERED

## 2018-08-22 PROCEDURE — 81003 URINALYSIS AUTO W/O SCOPE: CPT

## 2018-08-22 PROCEDURE — 96360 HYDRATION IV INFUSION INIT: CPT

## 2018-08-22 PROCEDURE — 63600175 PHARM REV CODE 636 W HCPCS: Performed by: ANESTHESIOLOGY

## 2018-08-22 PROCEDURE — 85025 COMPLETE CBC W/AUTO DIFF WBC: CPT

## 2018-08-22 PROCEDURE — 36000708 HC OR TIME LEV III 1ST 15 MIN: Performed by: OBSTETRICS & GYNECOLOGY

## 2018-08-22 PROCEDURE — 71000033 HC RECOVERY, INTIAL HOUR: Performed by: OBSTETRICS & GYNECOLOGY

## 2018-08-22 PROCEDURE — 63600175 PHARM REV CODE 636 W HCPCS: Performed by: NURSE ANESTHETIST, CERTIFIED REGISTERED

## 2018-08-22 PROCEDURE — 36000709 HC OR TIME LEV III EA ADD 15 MIN: Performed by: OBSTETRICS & GYNECOLOGY

## 2018-08-22 PROCEDURE — 63600175 PHARM REV CODE 636 W HCPCS

## 2018-08-22 PROCEDURE — 63600175 PHARM REV CODE 636 W HCPCS: Performed by: OBSTETRICS & GYNECOLOGY

## 2018-08-22 PROCEDURE — 99285 EMERGENCY DEPT VISIT HI MDM: CPT | Mod: 25

## 2018-08-22 PROCEDURE — 81025 URINE PREGNANCY TEST: CPT | Performed by: PHYSICIAN ASSISTANT

## 2018-08-22 PROCEDURE — 88305 TISSUE EXAM BY PATHOLOGIST: CPT | Performed by: PATHOLOGY

## 2018-08-22 PROCEDURE — 84702 CHORIONIC GONADOTROPIN TEST: CPT

## 2018-08-22 RX ORDER — HYDROMORPHONE HYDROCHLORIDE 1 MG/ML
0.5 INJECTION, SOLUTION INTRAMUSCULAR; INTRAVENOUS; SUBCUTANEOUS EVERY 5 MIN PRN
Status: DISCONTINUED | OUTPATIENT
Start: 2018-08-22 | End: 2018-08-22

## 2018-08-22 RX ORDER — ONDANSETRON HYDROCHLORIDE 2 MG/ML
INJECTION, SOLUTION INTRAMUSCULAR; INTRAVENOUS
Status: DISCONTINUED | OUTPATIENT
Start: 2018-08-22 | End: 2018-08-22

## 2018-08-22 RX ORDER — SUCCINYLCHOLINE CHLORIDE 20 MG/ML
INJECTION INTRAMUSCULAR; INTRAVENOUS
Status: DISCONTINUED | OUTPATIENT
Start: 2018-08-22 | End: 2018-08-22

## 2018-08-22 RX ORDER — OXYCODONE AND ACETAMINOPHEN 5; 325 MG/1; MG/1
1 TABLET ORAL EVERY 4 HOURS PRN
Status: DISCONTINUED | OUTPATIENT
Start: 2018-08-22 | End: 2018-08-23 | Stop reason: HOSPADM

## 2018-08-22 RX ORDER — HYDROMORPHONE HYDROCHLORIDE 2 MG/ML
0.2 INJECTION, SOLUTION INTRAMUSCULAR; INTRAVENOUS; SUBCUTANEOUS EVERY 5 MIN PRN
Status: DISCONTINUED | OUTPATIENT
Start: 2018-08-22 | End: 2018-08-22 | Stop reason: HOSPADM

## 2018-08-22 RX ORDER — KETOROLAC TROMETHAMINE 30 MG/ML
30 INJECTION, SOLUTION INTRAMUSCULAR; INTRAVENOUS EVERY 8 HOURS
Status: DISCONTINUED | OUTPATIENT
Start: 2018-08-22 | End: 2018-08-23 | Stop reason: HOSPADM

## 2018-08-22 RX ORDER — OXYCODONE HYDROCHLORIDE 5 MG/1
5 TABLET ORAL
Status: DISCONTINUED | OUTPATIENT
Start: 2018-08-22 | End: 2018-08-22 | Stop reason: HOSPADM

## 2018-08-22 RX ORDER — DIPHENHYDRAMINE HYDROCHLORIDE 50 MG/ML
12.5 INJECTION INTRAMUSCULAR; INTRAVENOUS
Status: DISCONTINUED | OUTPATIENT
Start: 2018-08-22 | End: 2018-08-22 | Stop reason: HOSPADM

## 2018-08-22 RX ORDER — MIDAZOLAM HYDROCHLORIDE 1 MG/ML
INJECTION INTRAMUSCULAR; INTRAVENOUS
Status: DISCONTINUED | OUTPATIENT
Start: 2018-08-22 | End: 2018-08-22

## 2018-08-22 RX ORDER — MEPERIDINE HYDROCHLORIDE 50 MG/ML
12.5 INJECTION INTRAMUSCULAR; INTRAVENOUS; SUBCUTANEOUS EVERY 10 MIN PRN
Status: DISCONTINUED | OUTPATIENT
Start: 2018-08-22 | End: 2018-08-22 | Stop reason: HOSPADM

## 2018-08-22 RX ORDER — DEXAMETHASONE SODIUM PHOSPHATE 4 MG/ML
INJECTION, SOLUTION INTRA-ARTICULAR; INTRALESIONAL; INTRAMUSCULAR; INTRAVENOUS; SOFT TISSUE
Status: DISCONTINUED | OUTPATIENT
Start: 2018-08-22 | End: 2018-08-22

## 2018-08-22 RX ORDER — HYDROMORPHONE HYDROCHLORIDE 2 MG/ML
INJECTION, SOLUTION INTRAMUSCULAR; INTRAVENOUS; SUBCUTANEOUS
Status: COMPLETED
Start: 2018-08-22 | End: 2018-08-22

## 2018-08-22 RX ORDER — ROCURONIUM BROMIDE 10 MG/ML
INJECTION, SOLUTION INTRAVENOUS
Status: DISCONTINUED | OUTPATIENT
Start: 2018-08-22 | End: 2018-08-22

## 2018-08-22 RX ORDER — ACETAMINOPHEN 10 MG/ML
INJECTION, SOLUTION INTRAVENOUS
Status: DISCONTINUED | OUTPATIENT
Start: 2018-08-22 | End: 2018-08-22

## 2018-08-22 RX ORDER — HYDROMORPHONE HYDROCHLORIDE 2 MG/ML
0.4 INJECTION, SOLUTION INTRAMUSCULAR; INTRAVENOUS; SUBCUTANEOUS EVERY 5 MIN PRN
Status: DISCONTINUED | OUTPATIENT
Start: 2018-08-22 | End: 2018-08-22 | Stop reason: HOSPADM

## 2018-08-22 RX ORDER — SODIUM CHLORIDE, SODIUM LACTATE, POTASSIUM CHLORIDE, CALCIUM CHLORIDE 600; 310; 30; 20 MG/100ML; MG/100ML; MG/100ML; MG/100ML
INJECTION, SOLUTION INTRAVENOUS CONTINUOUS
Status: DISCONTINUED | OUTPATIENT
Start: 2018-08-22 | End: 2018-08-23 | Stop reason: HOSPADM

## 2018-08-22 RX ORDER — LIDOCAINE HCL/PF 100 MG/5ML
SYRINGE (ML) INTRAVENOUS
Status: DISCONTINUED | OUTPATIENT
Start: 2018-08-22 | End: 2018-08-22

## 2018-08-22 RX ORDER — PROPOFOL 10 MG/ML
VIAL (ML) INTRAVENOUS
Status: DISCONTINUED | OUTPATIENT
Start: 2018-08-22 | End: 2018-08-22

## 2018-08-22 RX ORDER — OXYCODONE AND ACETAMINOPHEN 10; 325 MG/1; MG/1
1 TABLET ORAL EVERY 4 HOURS PRN
Status: DISCONTINUED | OUTPATIENT
Start: 2018-08-22 | End: 2018-08-23 | Stop reason: HOSPADM

## 2018-08-22 RX ORDER — FENTANYL CITRATE 50 UG/ML
INJECTION, SOLUTION INTRAMUSCULAR; INTRAVENOUS
Status: DISCONTINUED | OUTPATIENT
Start: 2018-08-22 | End: 2018-08-22

## 2018-08-22 RX ORDER — MORPHINE SULFATE 2 MG/ML
2 INJECTION, SOLUTION INTRAMUSCULAR; INTRAVENOUS
Status: DISCONTINUED | OUTPATIENT
Start: 2018-08-22 | End: 2018-08-22

## 2018-08-22 RX ORDER — GLYCOPYRROLATE 0.2 MG/ML
INJECTION INTRAMUSCULAR; INTRAVENOUS
Status: DISCONTINUED | OUTPATIENT
Start: 2018-08-22 | End: 2018-08-22

## 2018-08-22 RX ORDER — SODIUM CHLORIDE, SODIUM LACTATE, POTASSIUM CHLORIDE, CALCIUM CHLORIDE 600; 310; 30; 20 MG/100ML; MG/100ML; MG/100ML; MG/100ML
INJECTION, SOLUTION INTRAVENOUS CONTINUOUS
Status: DISCONTINUED | OUTPATIENT
Start: 2018-08-22 | End: 2018-08-22

## 2018-08-22 RX ORDER — METOCLOPRAMIDE HYDROCHLORIDE 5 MG/ML
10 INJECTION INTRAMUSCULAR; INTRAVENOUS EVERY 10 MIN PRN
Status: DISCONTINUED | OUTPATIENT
Start: 2018-08-22 | End: 2018-08-22 | Stop reason: HOSPADM

## 2018-08-22 RX ORDER — ONDANSETRON 2 MG/ML
4 INJECTION INTRAMUSCULAR; INTRAVENOUS
Status: DISCONTINUED | OUTPATIENT
Start: 2018-08-22 | End: 2018-08-22

## 2018-08-22 RX ORDER — NEOSTIGMINE METHYLSULFATE 1 MG/ML
INJECTION, SOLUTION INTRAVENOUS
Status: DISCONTINUED | OUTPATIENT
Start: 2018-08-22 | End: 2018-08-22

## 2018-08-22 RX ADMIN — FENTANYL CITRATE 50 MCG: 50 INJECTION, SOLUTION INTRAMUSCULAR; INTRAVENOUS at 07:08

## 2018-08-22 RX ADMIN — HYDROMORPHONE HYDROCHLORIDE 0.4 MG: 2 INJECTION INTRAMUSCULAR; INTRAVENOUS; SUBCUTANEOUS at 08:08

## 2018-08-22 RX ADMIN — SUCCINYLCHOLINE CHLORIDE 100 MG: 20 INJECTION, SOLUTION INTRAMUSCULAR; INTRAVENOUS at 07:08

## 2018-08-22 RX ADMIN — DEXAMETHASONE SODIUM PHOSPHATE 8 MG: 4 INJECTION, SOLUTION INTRAMUSCULAR; INTRAVENOUS at 07:08

## 2018-08-22 RX ADMIN — LIDOCAINE HYDROCHLORIDE 75 MG: 20 INJECTION, SOLUTION INTRAVENOUS at 07:08

## 2018-08-22 RX ADMIN — OXYCODONE HYDROCHLORIDE AND ACETAMINOPHEN 1 TABLET: 10; 325 TABLET ORAL at 09:08

## 2018-08-22 RX ADMIN — ACETAMINOPHEN 1000 MG: 10 INJECTION, SOLUTION INTRAVENOUS at 07:08

## 2018-08-22 RX ADMIN — MIDAZOLAM HYDROCHLORIDE 2 MG: 1 INJECTION, SOLUTION INTRAMUSCULAR; INTRAVENOUS at 07:08

## 2018-08-22 RX ADMIN — NEOSTIGMINE METHYLSULFATE 4.5 MG: 1 INJECTION INTRAVENOUS at 08:08

## 2018-08-22 RX ADMIN — ROCURONIUM BROMIDE 15 MG: 10 INJECTION, SOLUTION INTRAVENOUS at 07:08

## 2018-08-22 RX ADMIN — PROMETHAZINE HYDROCHLORIDE 6.25 MG: 25 INJECTION INTRAMUSCULAR; INTRAVENOUS at 10:08

## 2018-08-22 RX ADMIN — KETOROLAC TROMETHAMINE 30 MG: 30 INJECTION, SOLUTION INTRAMUSCULAR at 10:08

## 2018-08-22 RX ADMIN — SODIUM CHLORIDE, SODIUM LACTATE, POTASSIUM CHLORIDE, AND CALCIUM CHLORIDE: .6; .31; .03; .02 INJECTION, SOLUTION INTRAVENOUS at 08:08

## 2018-08-22 RX ADMIN — SODIUM CHLORIDE, SODIUM LACTATE, POTASSIUM CHLORIDE, AND CALCIUM CHLORIDE: .6; .31; .03; .02 INJECTION, SOLUTION INTRAVENOUS at 07:08

## 2018-08-22 RX ADMIN — ROCURONIUM BROMIDE 5 MG: 10 INJECTION, SOLUTION INTRAVENOUS at 07:08

## 2018-08-22 RX ADMIN — ONDANSETRON 8 MG: 2 INJECTION, SOLUTION INTRAMUSCULAR; INTRAVENOUS at 07:08

## 2018-08-22 RX ADMIN — GLYCOPYRROLATE 0.6 MG: 0.2 INJECTION, SOLUTION INTRAMUSCULAR; INTRAVENOUS at 08:08

## 2018-08-22 RX ADMIN — SODIUM CHLORIDE 1000 ML: 0.9 INJECTION, SOLUTION INTRAVENOUS at 04:08

## 2018-08-22 RX ADMIN — PROPOFOL 150 MG: 10 INJECTION, EMULSION INTRAVENOUS at 07:08

## 2018-08-22 RX ADMIN — SODIUM CHLORIDE, SODIUM LACTATE, POTASSIUM CHLORIDE, AND CALCIUM CHLORIDE: .6; .31; .03; .02 INJECTION, SOLUTION INTRAVENOUS at 10:08

## 2018-08-22 RX ADMIN — SUGAMMADEX 200 MG: 100 INJECTION, SOLUTION INTRAVENOUS at 08:08

## 2018-08-22 RX ADMIN — FENTANYL CITRATE 100 MCG: 50 INJECTION, SOLUTION INTRAMUSCULAR; INTRAVENOUS at 07:08

## 2018-08-22 NOTE — ED PROVIDER NOTES
Encounter Date: 8/22/2018       History     Chief Complaint   Patient presents with    Nausea     37 year old female presents to ed cc of n/v/d x 2 days. patient denies fever at home. states  decrease in appetitte today.     38 yo female presents to the ED with complaints of LLQ pain, nausea, vomiting, and diarrhea sudden onset today. Patient has been able to keep food down today. The pain radiates into her left back when it gets severe. She rates the pain as 9/10. She states she had a similar episode approximately one week ago that only lasted a few hours and was not as severe as today's episode. She took tylenol PTA. Denies fever, hx of diverticulitis or colitis, dysuria, hematuria, vaginal bleeding. LMP late July/early August, on OCP's.      The history is provided by the patient. No  was used.     Review of patient's allergies indicates:  No Known Allergies  Past Medical History:   Diagnosis Date    Gestational diabetes mellitus     Hypothyroidism      History reviewed. No pertinent surgical history.  Family History   Problem Relation Age of Onset    Hyperlipidemia Father     Diabetes Maternal Grandmother     Cataracts Maternal Grandmother     Diabetes Paternal Grandmother     Diabetes Maternal Uncle     Diabetes Paternal Uncle     Hypothyroidism Paternal Cousin     Melanoma Neg Hx     Amblyopia Neg Hx     Blindness Neg Hx     Cancer Neg Hx     Hypertension Neg Hx     Macular degeneration Neg Hx     Retinal detachment Neg Hx     Strabismus Neg Hx     Stroke Neg Hx     Thyroid disease Neg Hx     Glaucoma Neg Hx      Social History     Tobacco Use    Smoking status: Never Smoker    Smokeless tobacco: Never Used   Substance Use Topics    Alcohol use: No     Alcohol/week: 0.0 oz    Drug use: No     Review of Systems   Constitutional: Negative for fever.   Gastrointestinal: Positive for abdominal pain, diarrhea, nausea and vomiting. Negative for blood in stool.    Genitourinary: Positive for flank pain (intermittent). Negative for dysuria, frequency, hematuria and vaginal bleeding.   All other systems reviewed and are negative.      Physical Exam     Initial Vitals [08/22/18 1522]   BP Pulse Resp Temp SpO2   121/66 68 20 98 °F (36.7 °C) 100 %      MAP       --         Physical Exam    Nursing note and vitals reviewed.  Constitutional: Vital signs are normal. She appears well-developed and well-nourished. No distress.   HENT:   Head: Normocephalic and atraumatic.   Nose: Nose normal.   Eyes: Conjunctivae and lids are normal.   Neck: Normal range of motion and phonation normal.   Cardiovascular: Normal rate, regular rhythm and normal heart sounds.   Pulmonary/Chest: Effort normal and breath sounds normal. No respiratory distress.   Abdominal: Soft. Normal appearance and bowel sounds are normal. There is tenderness in the left lower quadrant. There is no CVA tenderness.       Musculoskeletal: Normal range of motion.   Neurological: She is alert and oriented to person, place, and time.   Skin: Skin is warm and dry.   Psychiatric: She has a normal mood and affect. Her speech is normal and behavior is normal. Judgment and thought content normal. Cognition and memory are normal.         ED Course   Procedures  Labs Reviewed   CBC W/ AUTO DIFFERENTIAL - Abnormal; Notable for the following components:       Result Value    RBC 3.72 (*)     Hemoglobin 11.5 (*)     Hematocrit 34.1 (*)     Gran% 75.4 (*)     Mono% 3.6 (*)     All other components within normal limits   POCT URINE PREGNANCY - Abnormal; Notable for the following components:    POC Preg Test, Ur Positive (*)     All other components within normal limits   COMPREHENSIVE METABOLIC PANEL   URINALYSIS, REFLEX TO URINE CULTURE    Narrative:     Preferred Collection Type->Urine, Clean Catch   HCG, QUANTITATIVE, PREGNANCY          Imaging Results          US OB Less Than 14 Wks with Transvaginal (xpd) (Final result)     Abnormal   Result time 08/22/18 17:15:57   Procedure changed from US OB Less Than 14 Wks First Gestation     Final result by Franki Mayberry MD (08/22/18 17:15:57)                 Impression:      No intrauterine pregnancy.  Serum HCG is not available at the time of this interpretation.  An ectopic pregnancy is not excluded by this exam.    Enlarged heterogeneous left ovary.  Symmetric and expected vascularity in both ovaries, but given the new enlargement of the left ovary, this is possibly an intermittent torsion.    Heterogeneous free pelvic fluid.  This can be seen with a ruptured ectopic pregnancy.    These findings are all worrisome for an occult or ruptured ectopic pregnancy.    Recommend follow-up serum HCG levels and repeat pelvic ultrasound, as clinically indicated.    This report was flagged in Epic as abnormal.      Electronically signed by: Franki Mayberry MD  Date:    08/22/2018  Time:    17:15             Narrative:    EXAMINATION:  US OB LESS THAN 14 WKS WITH TRANSVAGINAL (XPD)    CLINICAL HISTORY:  llq pain; Left lower quadrant pain    TECHNIQUE:  Transabdominal sonography of the pelvis was performed, followed by transvaginal sonography to better evaluate the uterus and ovaries.    COMPARISON:  December 16, 2015    FINDINGS:  No intrauterine fetus.  No gestational sac in the endometrial cavity.  The uterus is normal in echotexture and size, measuring 7.5 x 3.9 by 4.6 cm.  The endometrium is unremarkable, measuring 0.2 cm in thickness.  Unremarkable cervix.    The right ovary is unremarkable, measuring 2.1 x 2.1 x 1.8 cm.    The left ovary is enlarged compared to the right and since the prior exam, measuring 5.4 x 3.2 x 3.8 cm (previously 2.8 x 1.3 x 3.3 cm).  The left ovary is heterogeneous in echotexture and contains an oval fluid collection measuring 0.9 cm.    Duplex Doppler images show symmetric and expected bilateral ovarian vascularity.    There is small volume heterogeneous free pelvic fluid.                                  Medical Decision Making:   Initial Assessment:   36 yo female with LLQ pain, nausea, vomiting, and diarrhea x today. TTP in LLQ, -CVA tenderness, NAD, VSS. Afebrile. LMP late July.Early August, patient on OCP's.  Clinical Tests:   Lab Tests: Ordered and Reviewed  Radiological Study: Reviewed and Ordered  ED Management:  Labs, Iv fluids, US, meds ordered.  UPT positive. Patient notified and was unaware of pregnancy.   A pos per chart review. CBC mild anemia noted. CMP, UA WNL. bHCG 7700. US shows No intrauterine pregnancy.  Serum HCG is not available at the time of this interpretation.  An ectopic pregnancy is not excluded by this exam. Enlarged heterogeneous left ovary.  Symmetric and expected vascularity in both ovaries, but given the new enlargement of the left ovary, this is possibly an intermittent torsion. Heterogeneous free pelvic fluid.  This can be seen with a ruptured ectopic pregnancy. These findings are all worrisome for an occult or ruptured ectopic pregnancy.  5:40Pm  Spoke to Dr Parrish regarding patients results. She will see patient and take her to the OR for surgery.  Spoke with patient regarding results and treatment plan. She states understanding.               Attending Attestation:     Physician Attestation Statement for NP/PA:   I discussed this assessment and plan of this patient with the NP/PA, but I did not personally examine the patient. The face to face encounter was performed by the NP/PA.                     Clinical Impression:   The primary encounter diagnosis was Ectopic pregnancy without intrauterine pregnancy, unspecified location. A diagnosis of LLQ pain was also pertinent to this visit.                             Radhika Fuentes PA-C  08/22/18 1932       Harshal Mishra MD  08/22/18 1937

## 2018-08-22 NOTE — H&P
"Evelia Mishra is a 37 y.o. female who presents here with worsening LLQ pain.  Patient noted to have positive urine pregnancy test. Blood work shows serum bhcg at 7700.  Pelvic US shows no IUP, left adnexal mass and free fluid in the pelvis - consistent possible ruptured ectopic.    I have discussed the findings with the patient. Will proceed to the OR for laparoscopic left salpingectomy.    ROS: LLQ pain; no other complaints.  PMH: none  Social: , denies t/d/e  Gyhnx: wnl    Obhx:   -NSVDx2, term  -H/o ectopic pregnancy (right side) - s/p MTX  -h/o IVF twins resulting in fetal demise    Surgery: D&Cx3      PE:   Vitals: /69 (BP Location: Right arm, Patient Position: Lying)   Pulse 82   Temp 98 °F (36.7 °C) (Oral)   Resp 16   Ht 5' 2" (1.575 m)   Wt 57.2 kg (126 lb)   LMP 07/31/2018 (Approximate) Comment: states unsure since on birth control  SpO2 100%   BMI 23.05 kg/m²   APPEARANCE: Well nourished, well developed, in no acute distress.  ABDOMEN: Soft. Positive ttp with light palpation in the LLQ; no rebound but positive guarding.  Pelvic exam: deferred    Lab Results   Component Value Date    WBC 8.88 08/22/2018    HGB 11.5 (L) 08/22/2018    HCT 34.1 (L) 08/22/2018    MCV 92 08/22/2018     08/22/2018     A POS    Pelvic US:    TECHNIQUE:  Transabdominal sonography of the pelvis was performed, followed by transvaginal sonography to better evaluate the uterus and ovaries.    COMPARISON:  December 16, 2015    FINDINGS:  No intrauterine fetus.  No gestational sac in the endometrial cavity.  The uterus is normal in echotexture and size, measuring 7.5 x 3.9 by 4.6 cm.  The endometrium is unremarkable, measuring 0.2 cm in thickness.  Unremarkable cervix.    The right ovary is unremarkable, measuring 2.1 x 2.1 x 1.8 cm.    The left ovary is enlarged compared to the right and since the prior exam, measuring 5.4 x 3.2 x 3.8 cm (previously 2.8 x 1.3 x 3.3 cm).  The left ovary is heterogeneous in " echotexture and contains an oval fluid collection measuring 0.9 cm.    Duplex Doppler images show symmetric and expected bilateral ovarian vascularity.    There is small volume heterogeneous free pelvic fluid.      Impression       No intrauterine pregnancy.  Serum HCG is not available at the time of this interpretation.  An ectopic pregnancy is not excluded by this exam.    Enlarged heterogeneous left ovary.  Symmetric and expected vascularity in both ovaries, but given the new enlargement of the left ovary, this is possibly an intermittent torsion.    Heterogeneous free pelvic fluid.  This can be seen with a ruptured ectopic pregnancy.    These findings are all worrisome for an occult or ruptured ectopic pregnancy.    Recommend follow-up serum HCG levels and repeat pelvic ultrasound, as clinically indicated.    This report was flagged in Epic as abnormal.     AP: Possible ruptured LEFT ectopic pregnancy  Consents for surgery and blood signed.    john munoz MD

## 2018-08-22 NOTE — ED NOTES
Pt to ED with complaints of nausea/vomiting, diarrhea, and abdominal pain. Pt states she first experienced the pain about a week ago to the LLQ. States she took a nap and it went away. States by this morning, the pain came back with a higher severity along with nausea/vomiting and diarrhea. Pt states pain at a 9 out of 10 at this time to the LLQ. States it radiates to her left lower back and to her left pelvis. States she took tylenol 500 mg about 30 minutes ago.     APPEARANCE: Alert, oriented and in no acute distress.  CARDIAC: Normal rate and rhythm. S1S2 noted. Pt denies chest pain.  PERIPHERAL VASCULAR: peripheral pulses present. Normal cap refill. No edema. Warm to touch. 2+ radial pulses  RESPIRATORY:Normal rate and effort, breath sounds clear bilaterally throughout chest. Respirations are equal and unlabored no obvious signs of distress.  GASTRO: soft, bowel sounds normal. When palpating, pt states pain is exacerbated to the LLQ no matter where palpation is taking place. States nausea at this time as well. States she first threw up this morning, and then 2 hours after was able to keep a turkey sandwich down. States she last vomited about an hour ago. Denies red emesis. States diarrhea as well, and denies redness in the stool.  MUSC: Full ROM. No bony tenderness or soft tissue tenderness. No obvious deformity.  SKIN: Skin is warm and dry, normal skin turgor, mucous membranes moist.  NEURO: 5/5 strength major flexors/extensors bilaterally. Sensory intact to light touch bilaterally. Kaleb coma scale: eyes open spontaneously-4, oriented & converses-5, obeys commands-6. No neurological abnormalities.   MENTAL STATUS: awake, alert and aware of environment.  EYE: No obvious discharge.  ENT: EARS: no obvious drainage. NOSE: no active bleeding.

## 2018-08-22 NOTE — ANESTHESIA PREPROCEDURE EVALUATION
08/22/2018     Evelia Mishra is a 37 y.o., female for LAPAROSCOPY, DIAGNOSTIC (N/A Abdomen) for concern for ruptured ectopic pregnancy.    Past Medical History:   Diagnosis Date    Gestational diabetes mellitus     Hypothyroidism      History reviewed. No pertinent surgical history.  Review of patient's allergies indicates:  No Known Allergies  Recent Labs   Lab  08/22/18   1604   WBC  8.88   RBC  3.72*   HGB  11.5*   HCT  34.1*   PLT  337   MCV  92   MCH  30.9   MCHC  33.7     BMP  Lab Results   Component Value Date     08/22/2018    K 3.8 08/22/2018     08/22/2018    CO2 24 08/22/2018    BUN 10 08/22/2018    CREATININE 0.7 08/22/2018    CALCIUM 9.3 08/22/2018    ANIONGAP 8 08/22/2018    ESTGFRAFRICA >60 08/22/2018    EGFRNONAA >60 08/22/2018       Anesthesia Evaluation    I have reviewed the Patient Summary Reports.     I have reviewed the Medications.     Review of Systems  Anesthesia Hx:  No previous Anesthesia Does not like nitrous- makes her feel jittery History of prior surgery of interest to airway management or planning:  Denies Personal Hx of Anesthesia complications.   Social:  Non-Smoker    Hematology/Oncology:         -- Anemia:   Cardiovascular:  Cardiovascular Normal     Pulmonary:  Pulmonary Normal    Renal/:  Renal/ Normal     Hepatic/GI:  Hepatic/GI Normal    OB/GYN/PEDS:  Concern for ruptured ectopic   Neurological:  Neurology Normal    Endocrine:   Hypothyroidism        Physical Exam  General:  Well nourished    Airway/Jaw/Neck:  Airway Findings: Mouth Opening: Normal Tongue: Normal  Mallampati: II  TM Distance: Normal, at least 6 cm  Jaw/Neck Findings:  Neck ROM: Normal ROM      Dental:  Dental Findings: In tactRecently had wisdom teeth removed, lower site    Chest/Lungs:  Chest/Lungs Clear    Heart/Vascular:  Heart Findings: Normal       Mental  Status:  Mental Status Findings:  Cooperative, Alert and Oriented         Anesthesia Plan  Type of Anesthesia, risks & benefits discussed:  Anesthesia Type:  general  Patient's Preference:   Intra-op Monitoring Plan: standard ASA monitors  Intra-op Monitoring Plan Comments:   Post Op Pain Control Plan: per primary service following discharge from PACU and multimodal analgesia  Post Op Pain Control Plan Comments:   Induction:   IV  Beta Blocker:  Patient is not currently on a Beta-Blocker (No further documentation required).       Informed Consent: Patient understands risks and agrees with Anesthesia plan.  Questions answered. Anesthesia consent signed with patient.  ASA Score: 2  emergent   Day of Surgery Review of History & Physical:    H&P update referred to the surgeon.         Ready For Surgery From Anesthesia Perspective.

## 2018-08-23 ENCOUNTER — PATIENT MESSAGE (OUTPATIENT)
Dept: ENDOCRINOLOGY | Facility: CLINIC | Age: 38
End: 2018-08-23

## 2018-08-23 ENCOUNTER — TELEPHONE (OUTPATIENT)
Dept: OBSTETRICS AND GYNECOLOGY | Facility: CLINIC | Age: 38
End: 2018-08-23

## 2018-08-23 ENCOUNTER — PATIENT MESSAGE (OUTPATIENT)
Dept: OBSTETRICS AND GYNECOLOGY | Facility: CLINIC | Age: 38
End: 2018-08-23

## 2018-08-23 VITALS
WEIGHT: 126 LBS | OXYGEN SATURATION: 98 % | HEART RATE: 89 BPM | BODY MASS INDEX: 23.19 KG/M2 | TEMPERATURE: 99 F | RESPIRATION RATE: 18 BRPM | HEIGHT: 62 IN | DIASTOLIC BLOOD PRESSURE: 64 MMHG | SYSTOLIC BLOOD PRESSURE: 109 MMHG

## 2018-08-23 LAB
BASOPHILS # BLD AUTO: 0 K/UL
BASOPHILS NFR BLD: 0 %
DIFFERENTIAL METHOD: ABNORMAL
EOSINOPHIL # BLD AUTO: 0 K/UL
EOSINOPHIL NFR BLD: 0 %
ERYTHROCYTE [DISTWIDTH] IN BLOOD BY AUTOMATED COUNT: 12.6 %
HCG INTACT+B SERPL-ACNC: 3584 MIU/ML
HCT VFR BLD AUTO: 30.7 %
HGB BLD-MCNC: 10.5 G/DL
LYMPHOCYTES # BLD AUTO: 0.8 K/UL
LYMPHOCYTES NFR BLD: 10.9 %
MCH RBC QN AUTO: 31.6 PG
MCHC RBC AUTO-ENTMCNC: 34.2 G/DL
MCV RBC AUTO: 93 FL
MONOCYTES # BLD AUTO: 0.1 K/UL
MONOCYTES NFR BLD: 0.7 %
NEUTROPHILS # BLD AUTO: 6.1 K/UL
NEUTROPHILS NFR BLD: 88.3 %
PLATELET # BLD AUTO: 301 K/UL
PMV BLD AUTO: 9.7 FL
RBC # BLD AUTO: 3.32 M/UL
WBC # BLD AUTO: 6.91 K/UL

## 2018-08-23 PROCEDURE — 36415 COLL VENOUS BLD VENIPUNCTURE: CPT

## 2018-08-23 PROCEDURE — G0378 HOSPITAL OBSERVATION PER HR: HCPCS

## 2018-08-23 PROCEDURE — 63600175 PHARM REV CODE 636 W HCPCS: Performed by: OBSTETRICS & GYNECOLOGY

## 2018-08-23 PROCEDURE — 84702 CHORIONIC GONADOTROPIN TEST: CPT

## 2018-08-23 PROCEDURE — 85025 COMPLETE CBC W/AUTO DIFF WBC: CPT

## 2018-08-23 RX ADMIN — KETOROLAC TROMETHAMINE 30 MG: 30 INJECTION, SOLUTION INTRAMUSCULAR at 04:08

## 2018-08-23 NOTE — PROGRESS NOTES
received phone call from Dr. Parrish nurse stating patient has been scheduled an appointment with Dr. Parrish.    Future Appointments   Date Time Provider Department Center   9/4/2018  2:30 PM Mark Parrish MD [facility] CAROL[facility] Lin

## 2018-08-23 NOTE — TRANSFER OF CARE
"Anesthesia Transfer of Care Note    Patient: Evelia Mishra    Procedure(s) Performed: Procedure(s) (LRB):  LAPAROSCOPY, DIAGNOSTIC (N/A)  REMOVAL, ECTOPIC PREGNANCY, LAPAROSCOPIC (Left)    Patient location: PACU    Anesthesia Type: general    Transport from OR: Transported from OR on 6-10 L/min O2 by face mask with adequate spontaneous ventilation    Post pain: adequate analgesia    Post assessment: no apparent anesthetic complications    Post vital signs: stable    Level of consciousness: sedated    Nausea/Vomiting: no nausea/vomiting    Complications: none    Transfer of care protocol was followed      Last vitals:   Visit Vitals  BP (!) 191/122   Pulse 83   Temp 36.4 °C (97.5 °F)   Resp 18   Ht 5' 2" (1.575 m)   Wt 57.2 kg (126 lb)   LMP 07/31/2018 (Approximate)   SpO2 100%   BMI 23.05 kg/m²     "

## 2018-08-23 NOTE — DISCHARGE SUMMARY
Admit Date 8/22/2018  Discharge date 8/23/2018    Admit Diagnosis: Abdominal Pain, Possible ruptured ectopic pregnancy  Discharge Diagnosis: Ruptured left ectopic pregnancy    Hospital Course The patient was admitted for emergent surgery for ruptured ectopic pregnancy. Her surgery was uncomplicated. Please see Dr. Parrish' operative note for details.  She was admitted to outpatient and watched overnight to ensure a good recovery.  On postoperative day 1, she was tolerating a regular diet. Her pain was well controlled with pain medications.  She was voiding spontaneously.  She was ambulating.  She was stable for discharge.    Disposition: stable, discharge to home    Labs:   Lab Results   Component Value Date    WBC 6.91 08/23/2018    HGB 10.5 (L) 08/23/2018    HCT 30.7 (L) 08/23/2018    MCV 93 08/23/2018     08/23/2018       Discharge Medications  Patient declined    Discharge Instructions  1) Patient should report the following symptoms to MD or proceed to Emergency Room for evaluation: fever greater than 100.4F, persistent/heavy vaginal bleeding, abdominal pain not relieved with pain medications, persistent nausea and vomiting.  2) Patient can resume regular diet.   3) No heavy lifting  4) F/u with Dr. Parrish in two weeks for postop visit  SONG Parrish MD

## 2018-08-23 NOTE — OP NOTE
Operative Note    Date: 8/22/2018  Time: 8:23 PM  Preoperative Diagnosis: possible LEFT ectopic (ruptured)  Postoperative Diagnosis: LEFT ruptured ectopic, hemoperitoneum  Procedure: laparoscopic left salpingectomy  Type of Anesthesia: general  Surgeon: Mark Parrish MD  Assistant Surgeon: Deanne Tejeda  Specimen/Tissue Removed: left fallopian tube with ectopic pregnancy  Estimated Blood Loss: min  Urine Output: 150cc clear  IV Fluids: 1000 cc NS  Complications: none   Findings: hemoperitoneum about 250cc of blood clot in the abdomen/posterior cul-de-sac; enlarged left fallipian tube with blood; normal appearing right tube and ovary    TECHNIQUE: The patient was taken to the Operating Room, where her general   anesthesia was found to be adequate. She was then prepared and draped in the   normal sterile fashion. An infraumbilical incision was made with the scalpel.   A 10-mm trocar was then inserted directly into the abdomen under direct   laparoscopic supervision. CO2 insufflation was turned on. Survey of the   abdomen revealed the aforementioned findings.Accessory 5 mm trocars were placed laterally on both sides of the patient.     Enlarged left fallopian tube, was grasped then with the LigaSure device was then used to coagulate and cut through the mesosalpinx, and tube was then successfully excised. The specimen was placed in the endocatch bag and removed through the infraumbilical incision.  The abdomen was then irrigated and removed of clots/debris.    All instruments were then removed from the patient's abdomen. The  Infraumbilical fascia was closed with 0 vicryl suture.  The 5 mm trocar sites were closed with monocryl suture.    ramos catheter was removed.  The patient tolerated the procedure well. Sponge and needle counts were correct  x3. The patient received 2 grams of Ancef prior to the procedure. The patient  was extubated successfully in the Operating Room and taken to the PACU in a   stable  satish.      SONG Parrish MD

## 2018-08-23 NOTE — DISCHARGE INSTRUCTIONS
1) Nothing per vagina until postop visit  2) Patient should report the following symptoms to MD or proceed to Emergency Room for evaluation: fever greater than 100.4F, persistent/heavy vaginal bleeding, abdominal pain not relieved with pain medications, persistent nausea and vomiting.  3) No heavy lifting until postop visit

## 2018-08-23 NOTE — PLAN OF CARE
Problem: Patient Care Overview  Goal: Plan of Care Review  Outcome: Ongoing (interventions implemented as appropriate)  Pt rested very well overnight. Denies pain at this time. IVF infusing per order. Pt experienced x1 nausea tonight relieved with prn medication. Able to tolerate regular diet. No distress noted. Safety maintained. Will continue to monitor.

## 2018-08-23 NOTE — PROGRESS NOTES
Patient is AAOx3, NAD noted, VSS.   at the bedside.  Patient states feeling of tightness on incision site. Patient IV removed and Discharge instructions reviewed at bedside with patient and . No prescriptions ordered. Safety maintained. Waiting on wheelchair.

## 2018-08-23 NOTE — PLAN OF CARE
Follow-up With  Details  Why  Contact Info   Mark Parrish MD  In 2 weeks  for postop visit, nurse will call with appointment  [address]  [address] [place] [id number] [phone]     Discharge rounds on patient. Discussed followup appointments, blue discharge folder, discharge nurse will go over home medications and reasons for medications and final discharge instructions. All patient/caregiver questions answered. Patient verbalized understanding.       08/23/18 0931   Final Note   Assessment Type Final Discharge Note   Discharge Disposition Home   What phone number can be called within the next 1-3 days to see how you are doing after discharge? [phone]   Hospital Follow Up  Appt(s) scheduled? Yes   Discharge plans and expectations educations in teach back method with documentation complete? Yes   Right Care Referral Info   Post Acute Recommendation No Care

## 2018-08-23 NOTE — ANESTHESIA POSTPROCEDURE EVALUATION
"Anesthesia Post Evaluation    Patient: Evelia Mishra    Procedure(s) Performed: Procedure(s) (LRB):  LAPAROSCOPY, DIAGNOSTIC (N/A)  REMOVAL, ECTOPIC PREGNANCY, LAPAROSCOPIC (Left)    Final Anesthesia Type: general  Patient location during evaluation: PACU  Patient participation: Yes- Able to Participate  Level of consciousness: awake and alert  Post-procedure vital signs: reviewed and stable  Pain management: adequate  Airway patency: patent  PONV status at discharge: No PONV  Anesthetic complications: no      Cardiovascular status: blood pressure returned to baseline  Respiratory status: unassisted  Hydration status: euvolemic  Follow-up not needed.        Visit Vitals  /67   Pulse 71   Temp 36.4 °C (97.5 °F)   Resp (!) 22   Ht 5' 2" (1.575 m)   Wt 57.2 kg (126 lb)   LMP 07/31/2018 (Approximate)   SpO2 100%   BMI 23.05 kg/m²       Pain/Nicholas Score: Pain Assessment Performed: Yes (8/22/2018  8:55 PM)  Presence of Pain: complains of pain/discomfort (8/22/2018  8:55 PM)  Pain Rating Prior to Med Admin: 7 (8/22/2018  9:09 PM)  Nicholas Score: 10 (8/22/2018  8:55 PM)        "

## 2018-08-23 NOTE — TELEPHONE ENCOUNTER
New patient no. [phone]    Yary , called.   No. [phone]    New patient was seen in the hospital for ectopic pregnancy.   She needs a hospital follow up appointment in 2 weeks.  Patient would like a Tuesday or a Wednesday after 2:00 or a Thursday after 3:00.   Please call patient to schedule.     Appt. Has been set up for pt.

## 2018-08-23 NOTE — PROGRESS NOTES
POD #1    S: patient doing well. Pain improved. Tolerating PO.    O  Temp:  [96.2 °F (35.7 °C)-98.2 °F (36.8 °C)]   Pulse:  []   Resp:  [16-20]   BP: ()/()   SpO2:  [95 %-100 %]    Gen: a&oX3, nad  Abd: soft, incision x 3 c/d/i, positive bowel sounds  Ext: no edema    Lab Results   Component Value Date    WBC 6.91 2018    HGB 10.5 (L) 2018    HCT 30.7 (L) 2018    MCV 93 2018     2018     A POS    AP: Evelia Mishra is a 37 y.o. female  s/p laparoscopic left salpingectomy for ruptured ectopic pregnancy, doing well.  Continue routine postop care  PO pain meds  Regular diet.    Discharge home today    john munoz MD

## 2018-08-25 ENCOUNTER — PATIENT MESSAGE (OUTPATIENT)
Dept: OBSTETRICS AND GYNECOLOGY | Facility: CLINIC | Age: 38
End: 2018-08-25

## 2018-08-27 NOTE — TELEPHONE ENCOUNTER
Harish Flores,      Sorry to trouble you but I have two questions. Firstly I forgot what you said about the steristrips. Do I take them off and when? Secondly, now Im starting to have some bleeding. This is expected right?  Hope all is well with you.     Sincerely  Charit      Please advise.

## 2018-08-28 ENCOUNTER — TELEPHONE (OUTPATIENT)
Dept: OBSTETRICS AND GYNECOLOGY | Facility: CLINIC | Age: 38
End: 2018-08-28

## 2018-08-28 NOTE — TELEPHONE ENCOUNTER
Thanks Kyle. I was thinking that too that the bleeding was from the endometrium shedding so Im glad this is to be expected. Its tapering off and the pain is gone its just a little sore at the umbilical site but even that is better. See you next week. Hope you are ok.     Sincerely

## 2018-09-04 ENCOUNTER — OFFICE VISIT (OUTPATIENT)
Dept: OBSTETRICS AND GYNECOLOGY | Facility: CLINIC | Age: 38
End: 2018-09-04
Payer: COMMERCIAL

## 2018-09-04 VITALS
BODY MASS INDEX: 23.05 KG/M2 | WEIGHT: 125.25 LBS | SYSTOLIC BLOOD PRESSURE: 100 MMHG | HEIGHT: 62 IN | DIASTOLIC BLOOD PRESSURE: 60 MMHG

## 2018-09-04 DIAGNOSIS — Z48.89 POSTOPERATIVE VISIT: Primary | ICD-10-CM

## 2018-09-04 DIAGNOSIS — Z30.011 ENCOUNTER FOR INITIAL PRESCRIPTION OF CONTRACEPTIVE PILLS: ICD-10-CM

## 2018-09-04 PROCEDURE — 99024 POSTOP FOLLOW-UP VISIT: CPT | Mod: S$GLB,,, | Performed by: OBSTETRICS & GYNECOLOGY

## 2018-09-04 PROCEDURE — 99999 PR PBB SHADOW E&M-EST. PATIENT-LVL III: CPT | Mod: PBBFAC,,, | Performed by: OBSTETRICS & GYNECOLOGY

## 2018-09-04 NOTE — PROGRESS NOTES
"Postop     Patient presents for postoperative visit today. She is s/p LEFT salpingectomy for ruptured left ectopic pregnancy on 8/22/18. Patient denies feverand chills. She denies constipation and diarrhea. Denies pain. Reports skin rash on the abdomen and the vaginal area that itches a lot.     ROS: per HPI     PE:   Vitals: /60   Ht 5' 2" (1.575 m)   Wt 56.8 kg (125 lb 3.5 oz)   LMP 08/08/2018   BMI 22.90 kg/m²   APPEARANCE: Well nourished, well developed, in no acute distress.  ABDOMEN: Soft. Papules all over abdomen;  PELVIC: External female genitalia with dry patches;  Vagina moist and well rugated without lesions or discharge. Cervix pink and without lesions. No significant cystocele or rectocele. Bimanual exam showed deferred    A/P:   1) Postop:  -patient healing well from procedure/surgery  -benign pathology discussed with the patient - copy of pathology results discussed with the patient   -skin rash - likely secondary to skin prep from surgery - will try hydrocortisone 1%    2) Contraception: will restart OCPs    SONG Parrish MD        "

## 2018-09-11 ENCOUNTER — PATIENT MESSAGE (OUTPATIENT)
Dept: ENDOCRINOLOGY | Facility: CLINIC | Age: 38
End: 2018-09-11

## 2018-09-11 ENCOUNTER — LAB VISIT (OUTPATIENT)
Dept: LAB | Facility: HOSPITAL | Age: 38
End: 2018-09-11
Attending: INTERNAL MEDICINE
Payer: COMMERCIAL

## 2018-09-11 DIAGNOSIS — E03.9 HYPOTHYROIDISM (ACQUIRED): ICD-10-CM

## 2018-09-11 LAB
25(OH)D3+25(OH)D2 SERPL-MCNC: 31 NG/ML
ESTIMATED AVG GLUCOSE: 97 MG/DL
HBA1C MFR BLD HPLC: 5 %
TSH SERPL DL<=0.005 MIU/L-ACNC: 2.65 UIU/ML

## 2018-09-11 PROCEDURE — 84443 ASSAY THYROID STIM HORMONE: CPT

## 2018-09-11 PROCEDURE — 83036 HEMOGLOBIN GLYCOSYLATED A1C: CPT

## 2018-09-11 PROCEDURE — 82306 VITAMIN D 25 HYDROXY: CPT

## 2018-09-11 PROCEDURE — 36415 COLL VENOUS BLD VENIPUNCTURE: CPT

## 2018-12-14 ENCOUNTER — OFFICE VISIT (OUTPATIENT)
Dept: PODIATRY | Facility: CLINIC | Age: 38
End: 2018-12-14
Payer: COMMERCIAL

## 2018-12-14 VITALS
SYSTOLIC BLOOD PRESSURE: 109 MMHG | BODY MASS INDEX: 23.13 KG/M2 | HEART RATE: 73 BPM | DIASTOLIC BLOOD PRESSURE: 75 MMHG | RESPIRATION RATE: 18 BRPM | HEIGHT: 62 IN | WEIGHT: 125.69 LBS

## 2018-12-14 DIAGNOSIS — Q82.8 POROKERATOSIS: Primary | ICD-10-CM

## 2018-12-14 DIAGNOSIS — M20.12 HALLUX VALGUS OF LEFT FOOT: ICD-10-CM

## 2018-12-14 PROCEDURE — 99213 OFFICE O/P EST LOW 20 MIN: CPT | Mod: S$GLB,,, | Performed by: PODIATRIST

## 2018-12-14 PROCEDURE — 3008F BODY MASS INDEX DOCD: CPT | Mod: CPTII,S$GLB,, | Performed by: PODIATRIST

## 2018-12-14 PROCEDURE — 99999 PR PBB SHADOW E&M-EST. PATIENT-LVL III: CPT | Mod: PBBFAC,,, | Performed by: PODIATRIST

## 2018-12-14 NOTE — PROGRESS NOTES
Subjective:      Patient ID: Evelia Mishra is a 38 y.o. female.    Chief Complaint: PCP (Gely Garcia MD 14); Foot Problem (numbness left foot ); Foot Pain (both feet ); and Callouses    Evelia is a 38 y.o. female who presents to the podiatry clinic  with complaint of  bilateral foot pain. Onset of the symptoms was several weeks ago. Precipitating event: none known. Current symptoms include: ability to bear weight, but with some pain. Aggravating factors: walking. Symptoms have gradually worsened. Patient has had prior foot problems. Evaluation to date: none. Treatment to date: none.         Patient Active Problem List   Diagnosis    Positive PPD    Fibroadenoma of breast    Pelvic muscle wasting     screening encounter    Vitamin D insufficiency    Subclinical hypothyroidism    History of gestational diabetes    De Quervain's tenosynovitis, bilateral    Ectopic pregnancy without intrauterine pregnancy       Current Outpatient Medications on File Prior to Visit   Medication Sig Dispense Refill    norethindrone-e.estradiol-iron (TAYTULLA) 1 mg-20 mcg (24)/75 mg (4) Cap Take 1 capsule by mouth once daily. 84 capsule 2    ammonium lactate 12 % Crea Apply twice daily to affected parts both feet as needed. 140 g 11    cholecalciferol, vitamin D3, (VITAMIN D3) 2,000 unit Cap Take 1 capsule by mouth once daily.      FREESTYLE LANCETS 28 gauge lancets       norethindrone-e.estradiol-iron 1 mg-10 mcg (24)/10 mcg (2) Tab Take 1 tablet by mouth once daily. 28 tablet 12    PRENATAL VIT #76/IRON,CARB/FA (PNV 29-1 ORAL) Take 1 tablet by mouth once daily.       No current facility-administered medications on file prior to visit.        Review of patient's allergies indicates:  No Known Allergies    Past Surgical History:   Procedure Laterality Date    DIAGNOSTIC LAPAROSCOPY N/A 2018    Procedure: LAPAROSCOPY, DIAGNOSTIC;  Surgeon: Mark Parrish MD;  Location: [facility] OR;  Service:  OB/GYN;  Laterality: N/A;    DILATION-CURETTAGE OF UTERUS (D AND C) N/A 11/25/2016    [facility] at [facility] L[facility]D    LAPAROSCOPIC TREATMENT OF ECTOPIC PREGNANCY Left 8/22/2018    Procedure: REMOVAL, ECTOPIC PREGNANCY, LAPAROSCOPIC;  Surgeon: Mark Parrish MD;  Location: [facility] OR;  Service: OB/GYN;  Laterality: Left;    LAPAROSCOPY, DIAGNOSTIC N/A 8/22/2018    Performed by [facility] MD at [facility] OR    REMOVAL, ECTOPIC PREGNANCY, LAPAROSCOPIC Left 8/22/2018    Performed by Mark Parrsih MD at [facility] OR       Family History   Problem Relation Age of Onset    Hyperlipidemia Father     Diabetes Maternal Grandmother     Cataracts Maternal Grandmother     Diabetes Paternal Grandmother     Diabetes Maternal Uncle     Diabetes Paternal Uncle     Hypothyroidism Paternal Cousin     Melanoma Neg Hx     Amblyopia Neg Hx     Blindness Neg Hx     Cancer Neg Hx     Hypertension Neg Hx     Macular degeneration Neg Hx     Retinal detachment Neg Hx     Strabismus Neg Hx     Stroke Neg Hx     Thyroid disease Neg Hx     Glaucoma Neg Hx        Social History     Socioeconomic History    Marital status:      Spouse name: Not on file    Number of children: Not on file    Years of education: Not on file    Highest education level: Not on file   Social Needs    Financial resource strain: Not on file    Food insecurity - worry: Not on file    Food insecurity - inability: Not on file    Transportation needs - medical: Not on file    Transportation needs - non-medical: Not on file   Occupational History    Occupation: physician     Employer: [employer]   Tobacco Use    Smoking status: Never Smoker    Smokeless tobacco: Never Used   Substance and Sexual Activity    Alcohol use: No     Alcohol/week: 0.0 oz    Drug use: No    Sexual activity: Not Currently     Partners: Male     Birth control/protection: None   Other Topics Concern    Are you  "pregnant or think you may be? No    Breast-feeding No   Social History Narrative    Not on file               Review of Systems   Constitution: Negative for chills, decreased appetite and fever.   Cardiovascular: Negative for leg swelling.   Musculoskeletal: Negative for arthritis, joint pain, joint swelling and myalgias.   Gastrointestinal: Negative for nausea and vomiting.   Neurological: Negative for loss of balance, numbness and paresthesias.           Objective:       Vitals:    12/14/18 0821   BP: 109/75   Pulse: 73   Resp: 18   Weight: 57 kg (125 lb 10.6 oz)   Height: 5' 2" (1.575 m)   PainSc:   4   PainLoc: Foot        Physical Exam   Constitutional: She is oriented to person, place, and time. She appears well-developed and well-nourished.   Cardiovascular:   Pulses:       Dorsalis pedis pulses are 2+ on the right side, and 2+ on the left side.        Posterior tibial pulses are 2+ on the right side, and 2+ on the left side.   Musculoskeletal: She exhibits no edema or tenderness.        Right ankle: Normal.        Left ankle: Normal.        Right foot: There is no swelling, no crepitus and no deformity.        Left foot: There is no swelling, no crepitus and no deformity.   Adequate joint range of motion without pain, limitation, nor crepitation Bilateral feet and ankle joints. Muscle strength is 5/5 in all groups bilaterally.      1st MPJ exostosis w/ lateral deviation of hallux, non trackbound. No pain w/ ROM to L  hallux     Lymphadenopathy:   No palpable lymph nodes   Neurological: She is alert and oriented to person, place, and time. She has normal strength.   Skin: Skin is warm, dry and intact. No rash noted. No erythema. Nails show no clubbing.   Nucleated solitary  hyperkeratotic lesion noted to plantar R sub 2-3 mpj x 2. There is TTP to lesion . Divergent skin lines are noted      Psychiatric: She has a normal mood and affect. Her behavior is normal.             Assessment:       Encounter " Diagnoses   Name Primary?    Porokeratosis - Right Foot Yes    Hallux valgus of left foot          Plan:       Evelia was seen today for pcp, foot problem, foot pain and callouses.    Diagnoses and all orders for this visit:    Porokeratosis - Right Foot    Hallux valgus of left foot      I counseled the patient on her conditions, their implications and medical management.      The affected area was cleansed with an alcohol prep pad. Next utilizing a mechanical   the hyperkeratotic tissues were filed. Attention was then directed to the [facility] where this area was carefully excised. No pinpoint bleeding was encountered.    Felt offloading aperture  applied to site to help offload region     Discussed use of OTC wart medications as per packing instructions     Use pumice stone to region after bathing 2-3x/week to decrease callus build up     If No improvement I recommend Candin injection      L foot pain/nueralgia 2/2 HAV     Recommend Vionic shoes, minimize barefoot walking     Tylenol prn pain     Has info in regards to spenco inserts dispensed by Dr Pinto     Discussed surgical and conservative management of HAV deformity. Conservatively we did discuss padding, and shoe modifications such as softer shoes with wide toe boxes. Surgically we briefly discussed pre and post operative expectations. The patient elects for conservative management at this time

## 2018-12-14 NOTE — LETTER
December 14, 2018      Gely Garcia MD  [address]  [facility] [address]           [facility]  [address]  [facility] [address]  Phone: [phone]          Patient: Evelia Mishra   MR Number: [id number]   YOB: 1980   Date of Visit: 12/14/2018       Dear Dr. Gely Garcia:    Thank you for referring Evelia Mishra to me for evaluation. Attached you will find relevant portions of my assessment and plan of care.    If you have questions, please do not hesitate to call me. I look forward to following Evelia Mishra along with you.    Sincerely,    Symone Olsen, STEVE    Enclosure  CC:  No Recipients    If you would like to receive this communication electronically, please contact [email or web address] or [phone] to request more information on [employer] access.    For providers and/or their staff who would like to refer a patient to [facility], please contact us through our one-stop-shop provider referral line, [facility] , at [phone].    If you feel you have received this communication in error or would no longer like to receive these types of communications, please e-mail [email or web address]

## 2018-12-21 ENCOUNTER — OFFICE VISIT (OUTPATIENT)
Dept: OTOLARYNGOLOGY | Facility: CLINIC | Age: 38
End: 2018-12-21
Payer: COMMERCIAL

## 2018-12-21 VITALS
DIASTOLIC BLOOD PRESSURE: 64 MMHG | BODY MASS INDEX: 22.62 KG/M2 | HEART RATE: 76 BPM | WEIGHT: 123.69 LBS | SYSTOLIC BLOOD PRESSURE: 126 MMHG

## 2018-12-21 DIAGNOSIS — J34.89 SINUS PAIN: Primary | ICD-10-CM

## 2018-12-21 PROCEDURE — 99999 PR PBB SHADOW E&M-EST. PATIENT-LVL III: CPT | Mod: PBBFAC,,, | Performed by: OTOLARYNGOLOGY

## 2018-12-21 PROCEDURE — 3008F PR BODY MASS INDEX (BMI) DOCUMENTED: ICD-10-PCS | Mod: CPTII,S$GLB,, | Performed by: OTOLARYNGOLOGY

## 2018-12-21 PROCEDURE — 99203 PR OFFICE/OUTPT VISIT, NEW, LEVL III, 30-44 MIN: ICD-10-PCS | Mod: S$GLB,,, | Performed by: OTOLARYNGOLOGY

## 2018-12-21 PROCEDURE — 99999 PR PBB SHADOW E&M-EST. PATIENT-LVL III: ICD-10-PCS | Mod: PBBFAC,,, | Performed by: OTOLARYNGOLOGY

## 2018-12-21 PROCEDURE — 99203 OFFICE O/P NEW LOW 30 MIN: CPT | Mod: S$GLB,,, | Performed by: OTOLARYNGOLOGY

## 2018-12-21 PROCEDURE — 3008F BODY MASS INDEX DOCD: CPT | Mod: CPTII,S$GLB,, | Performed by: OTOLARYNGOLOGY

## 2018-12-22 ENCOUNTER — HOSPITAL ENCOUNTER (OUTPATIENT)
Dept: RADIOLOGY | Facility: HOSPITAL | Age: 38
Discharge: HOME | End: 2018-12-22
Attending: OTOLARYNGOLOGY
Payer: COMMERCIAL

## 2018-12-22 DIAGNOSIS — J34.89 SINUS PAIN: ICD-10-CM

## 2018-12-22 PROCEDURE — 70486 CT MAXILLOFACIAL W/O DYE: CPT | Mod: TC

## 2018-12-22 PROCEDURE — 70486 CT MAXILLOFACIAL W/O DYE: CPT | Mod: 26,,, | Performed by: RADIOLOGY

## 2018-12-26 NOTE — PROGRESS NOTES
Chief Complaint   Patient presents with    Consult     left sided sinus pain and burning         38 y.o. female presents with longstanding history of left sided sinus pain. She reports a burning sensation in the left nasal cavity radiating to the cheek. No epistaxis, no facial numbness. She has used nasal saline and Flonase in the past with some improvement. No history of sinus surgery.      Review of Systems     Constitutional: Negative for fatigue and unexpected weight change.   HENT: per HPI.  Eyes: Negative for visual disturbance.   Respiratory: Negative for shortness of breath, hemoptysis  Cardiovascular: Negative for chest pain and palpitations.   Genitourinary: Negative for dysuria and difficulty urinating.   Musculoskeletal: Negative for decreased ROM, back pain.   Skin: Negative for rash, sunburn, itching.   Neurological: Negative for dizziness and seizures.   Hematological: Negative for adenopathy. Does not bruise/bleed easily.   Psychiatric/Behavioral: Negative for agitation. The patient is not nervous/anxious.   Endocrine: Negative for rapid weight loss/weight gain, heat/cold intolerance.     Past Medical History:   Diagnosis Date    Gestational diabetes mellitus     Hypothyroidism        Past Surgical History:   Procedure Laterality Date    DILATION-CURETTAGE OF UTERUS (D AND C) N/A 11/25/2016    Performed by Nathan Deleon MD at [facility] L&D    LAPAROSCOPY, DIAGNOSTIC N/A 8/22/2018    Per[facility] by [facility]rish [facility] at [facility] OR    REMOVAL, ECTOPIC PREGNANCY, LAPAROSCOPIC Left 8/22/2018    Performed by Mark Parrish MD at [facility] OR       family history includes Cataracts in her maternal grandmother; Diabetes in her maternal grandmother, maternal uncle, paternal grandmother, and paternal uncle; Hyperlipidemia in her father; Hypothyroidism in her paternal cousin.    Pt  reports that  has never smoked. she has never used smokeless tobacco. She reports that she does not drink alcohol or use  drugs.    Review of patient's allergies indicates:  No Known Allergies     Physical Exam    Vitals:    12/21/18 1454   BP: 126/64   Pulse: 76     Body mass index is 22.62 kg/m².    Physical Exam   Constitutional: She is oriented to person, place, and time. She appears well-developed and well-nourished. No distress.   HENT:   Head: Normocephalic and atraumatic.   Right Ear: Hearing, tympanic membrane, external ear and ear canal normal. Tympanic membrane mobility is normal. No middle ear effusion. No decreased hearing is noted.   Left Ear: Hearing, tympanic membrane, external ear and ear canal normal. Tympanic membrane mobility is normal.  No middle ear effusion. No decreased hearing is noted.   Nose: Nose normal.   Mouth/Throat: Oropharynx is clear and moist.   Bilateral nasal cavities: no polyps or purulent fluid seen.   Eyes: Conjunctivae, EOM and lids are normal. Pupils are equal, round, and reactive to light. Right eye exhibits no discharge. Left eye exhibits no discharge.   Neck: Trachea normal, normal range of motion and phonation normal. Neck supple. No tracheal tenderness present. No tracheal deviation, no edema and no erythema present. No thyroid mass and no thyromegaly present.   Cardiovascular: Normal heart sounds.   Pulmonary/Chest: Breath sounds normal. No stridor.   Abdominal: Soft.   Lymphadenopathy:     She has no cervical adenopathy.   Neurological: She is alert and oriented to person, place, and time.   Skin: Skin is warm and dry. No rash noted. She is not diaphoretic. No erythema. No pallor.   Psychiatric: She has a normal mood and affect.   Nursing note and vitals reviewed.        Assessment     1. 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 Discussed nasal regimen with Flonase and nasal saline for improved mucociliary clearance. Will obtain CT Sinus for further evaluation. She will follow up with Dr. León. All questions were answered and she is  in agreement with the plan.

## 2019-02-26 ENCOUNTER — OFFICE VISIT (OUTPATIENT)
Dept: OTOLARYNGOLOGY | Facility: CLINIC | Age: 39
End: 2019-02-26
Payer: COMMERCIAL

## 2019-02-26 VITALS
DIASTOLIC BLOOD PRESSURE: 73 MMHG | WEIGHT: 122.81 LBS | HEIGHT: 62 IN | BODY MASS INDEX: 22.6 KG/M2 | HEART RATE: 69 BPM | SYSTOLIC BLOOD PRESSURE: 103 MMHG

## 2019-02-26 DIAGNOSIS — H60.8X3 CHRONIC ECZEMATOUS OTITIS EXTERNA OF BOTH EARS: ICD-10-CM

## 2019-02-26 DIAGNOSIS — J33.9 NASAL POLYP: ICD-10-CM

## 2019-02-26 DIAGNOSIS — J30.89 PERENNIAL ALLERGIC RHINITIS WITH SEASONAL VARIATION: Primary | ICD-10-CM

## 2019-02-26 DIAGNOSIS — J30.2 PERENNIAL ALLERGIC RHINITIS WITH SEASONAL VARIATION: Primary | ICD-10-CM

## 2019-02-26 PROCEDURE — 3008F BODY MASS INDEX DOCD: CPT | Mod: CPTII,S$GLB,, | Performed by: OTOLARYNGOLOGY

## 2019-02-26 PROCEDURE — 99999 PR PBB SHADOW E&M-EST. PATIENT-LVL III: CPT | Mod: PBBFAC,,, | Performed by: OTOLARYNGOLOGY

## 2019-02-26 PROCEDURE — 99999 PR PBB SHADOW E&M-EST. PATIENT-LVL III: ICD-10-PCS | Mod: PBBFAC,,, | Performed by: OTOLARYNGOLOGY

## 2019-02-26 PROCEDURE — 99214 OFFICE O/P EST MOD 30 MIN: CPT | Mod: 25,S$GLB,, | Performed by: OTOLARYNGOLOGY

## 2019-02-26 PROCEDURE — 31231 NASAL/SINUS ENDOSCOPY: ICD-10-PCS | Mod: S$GLB,,, | Performed by: OTOLARYNGOLOGY

## 2019-02-26 PROCEDURE — 3008F PR BODY MASS INDEX (BMI) DOCUMENTED: ICD-10-PCS | Mod: CPTII,S$GLB,, | Performed by: OTOLARYNGOLOGY

## 2019-02-26 PROCEDURE — 99214 PR OFFICE/OUTPT VISIT, EST, LEVL IV, 30-39 MIN: ICD-10-PCS | Mod: 25,S$GLB,, | Performed by: OTOLARYNGOLOGY

## 2019-02-26 PROCEDURE — 31231 NASAL ENDOSCOPY DX: CPT | Mod: S$GLB,,, | Performed by: OTOLARYNGOLOGY

## 2019-02-26 RX ORDER — AZELASTINE 1 MG/ML
1 SPRAY, METERED NASAL 2 TIMES DAILY
Qty: 30 ML | Refills: 2 | Status: SHIPPED | OUTPATIENT
Start: 2019-02-26 | End: 2021-02-02

## 2019-02-26 RX ORDER — HYDROCORTISONE AND ACETIC ACID 20.75; 10.375 MG/ML; MG/ML
4 SOLUTION AURICULAR (OTIC) 3 TIMES DAILY
Qty: 15 ML | Refills: 0 | Status: SHIPPED | OUTPATIENT
Start: 2019-02-26 | End: 2019-03-05

## 2019-02-26 NOTE — PROGRESS NOTES
"  Subjective:      Evelia Mishra is a 38 y.o. female who was self-referred for sinus pressure.    She relates a 2-year history of perennial, recurrent pressure and congestion in the midface, which predominates on the left side.  This will episodically progress to radiate to the left temple and cheek as a persistent headache.  She is generally bothered by a sensation of something abnormal situated in the medial left cheek.  She notes pruritic symptoms and postnasal drip, but denies notable sinus infections or aural fullness.  She does notes perennial, frequently itchy ears on both sides.  She recalls seeing an ENT prior to moving her from [place] in 2009 who diagnosed her with a "reactive polyp" in the nose.    Current sinonasal medications include Flonase and OTC antihistamines.  The last course of antibiotics was a long time ago.  She does not regularly use nasal decongestant sprays.    She recalls previously having allergy testing, which was positive for dust mites and ragweed.    She denies a history of asthma.    She denies a history of reflux symptoms.  She has not previously had an EGD.    She denies have a diagnosis of obstructive sleep apnea.     She has not had sinonasal surgery.    She does not recall a prior history of nasal trauma.    SNOT-22 score = 26, NOSE score = 20%, ETDQ-7 score = 1.0    Global QOL = 45%    Days missed = Less than 5      Past Medical History  She has a past medical history of Gestational diabetes mellitus and Hypothyroidism.    Past Surgical History  She has a past surgical history that includes Diagnostic laparoscopy (N/A, 8/22/2018) and Laparoscopic treatment of ectopic pregnancy (Left, 8/22/2018).    Family History  Her family history includes Cataracts in her maternal grandmother; Diabetes in her maternal grandmother, maternal uncle, paternal grandmother, and paternal uncle; Hyperlipidemia in her father; Hypothyroidism in her paternal cousin.    Social History  She reports " "that  has never smoked. she has never used smokeless tobacco. She reports that she does not drink alcohol or use drugs.    Allergies  She is allergic to other omega-3s.    Medications   She has a current medication list which includes the following prescription(s): norethindrone-e.estradiol-iron, acetic acid-hydrocortisone, and azelastine.    Review of Systems  Review of Systems   Constitutional: Negative for fatigue, fever and unexpected weight change.   HENT: Positive for congestion, postnasal drip, rhinorrhea and sinus pressure. Negative for dental problem, ear discharge, ear pain, facial swelling, hearing loss, hoarse voice, nosebleeds, sore throat, tinnitus, trouble swallowing and voice change.    Eyes: Negative for photophobia, discharge, itching and visual disturbance.   Respiratory: Negative for apnea, cough, shortness of breath and wheezing.    Cardiovascular: Negative for chest pain and palpitations.   Gastrointestinal: Negative for abdominal pain, nausea and vomiting.   Endocrine: Negative for cold intolerance and heat intolerance.   Genitourinary: Negative for difficulty urinating.   Musculoskeletal: Negative for arthralgias, back pain, myalgias and neck pain.   Skin: Negative for rash.   Allergic/Immunologic: Positive for environmental allergies. Negative for food allergies.   Neurological: Negative for dizziness, seizures, syncope, weakness and headaches.   Hematological: Negative for adenopathy. Does not bruise/bleed easily.   Psychiatric/Behavioral: Negative for decreased concentration, dysphoric mood and sleep disturbance. The patient is not nervous/anxious.           Objective:     /73   Pulse 69   Ht 5' 2" (1.575 m)   Wt 55.7 kg (122 lb 12.7 oz)   BMI 22.46 kg/m²        Constitutional:   She appears well-developed. She is cooperative. Normal speech.  No hoarse voice.      Head:  Normocephalic. Salivary glands normal.  Facial strength is normal.      Ears:    Right Ear: No drainage or " tenderness. Tympanic membrane is not perforated. Tympanic membrane mobility is normal. No middle ear effusion. No decreased hearing is noted.   Left Ear: No drainage or tenderness. Tympanic membrane is not perforated. Tympanic membrane mobility is normal.  No middle ear effusion. No decreased hearing is noted.   Diffuse flaky epithelium without fissures or excoriation.    Nose:  No mucosal edema, rhinorrhea, septal deviation or polyps. No epistaxis. Turbinate hypertrophy.  Turbinates normal and no turbinate masses.  Right sinus exhibits no maxillary sinus tenderness and no frontal sinus tenderness. Left sinus exhibits no maxillary sinus tenderness and no frontal sinus tenderness.   Focal polypoid swelling at head of left inferior turbinate.    Mouth/Throat  Oropharynx clear and moist without lesions or asymmetry and normal uvula midline. She does not have dentures. Normal dentition. No oral lesions or mucous membrane lesions. No oropharyngeal exudate or posterior oropharyngeal erythema. Mirror exam not performed due to patient tolerance.  Mirror exam not performed due to patient tolerance.      Neck:  Neck normal without thyromegaly masses, asymmetry, normal tracheal structure, crepitus, and tenderness, thyroid normal, trachea normal and no adenopathy. Normal range of motion present.     She has no cervical adenopathy.     Cardiovascular:   Regular rhythm.      Pulmonary/Chest:   Effort normal.     Psychiatric:   She has a normal mood and affect. Her speech is normal and behavior is normal.     Neurological:   No cranial nerve deficit.     Skin:   No rash noted.       Procedure    Nasal endoscopy performed.  See procedure note.     Left nasal valve with IT polyp     Left MT     Right nasal valve     Right MT        Data Reviewed    WBC (K/uL)   Date Value   08/23/2018 6.91     Eosinophil% (%)   Date Value   08/23/2018 0.0     Eos # (K/uL)   Date Value   08/23/2018 0.0     Platelets (K/uL)   Date Value   08/23/2018  301     Glucose (mg/dL)   Date Value   08/22/2018 93     No results found for: IGE    I independently reviewed the images of the CT sinuses dated 12/22/18. Pertinent findings include clear sinuses, mild rightward septal deviation and large inferior turbinates.       Assessment:     1. Perennial allergic rhinitis with seasonal variation    2. Nasal polyp    3. Chronic eczematous otitis externa of both ears         Plan:     I had a long discussion with the patient regarding her condition and the further workup and management options.  I reassured her as to the benign nature of today's findings, including the absence of sinonasal polyposis or chronic sinusitis.  Her unilateral focal turbinate swelling may represent local atopy, which has not been adequately managed.  I prescribed the daily use of azelastine spray to treat her nasal allergy.  For the ears, I prescribed the daily use of Vosol-HC drops to treat her eczematous condition.    Follow-up if symptoms worsen or fail to improve.

## 2019-02-26 NOTE — PROCEDURES
Nasal/sinus endoscopy  Date/Time: 2/26/2019 5:47 PM  Performed by: Nakul León MD  Authorized by: Nakul León MD     Consent Done?:  Yes (Verbal)  Anesthesia:     Local anesthetic:  Lidocaine 4% and Lukasz-Synephrine 1/2%    Patient tolerance:  Patient tolerated the procedure well with no immediate complications  Nose:     Procedure Performed:  Nasal Endoscopy  External:      No external nasal deformity  Intranasal:      Mucosa no polyps     Mucosa ulcers not present     Mucosa lesions present     Enlarged turbinates     No septum gross deformity  Nasopharynx:      No mucosa lesions     Adenoids not present     Posterior choanae patent     Eustachian tube patent     Mild rightward septal deviation.  Focal polypoid lesion of head of left inferior turbinate.  Middle meatus and turbinates wnl.  No polyps or purulence.

## 2019-04-05 ENCOUNTER — PATIENT MESSAGE (OUTPATIENT)
Dept: OBSTETRICS AND GYNECOLOGY | Facility: CLINIC | Age: 39
End: 2019-04-05

## 2019-06-27 ENCOUNTER — PATIENT MESSAGE (OUTPATIENT)
Dept: INTERNAL MEDICINE | Facility: CLINIC | Age: 39
End: 2019-06-27

## 2019-07-10 ENCOUNTER — TELEPHONE (OUTPATIENT)
Dept: INTERNAL MEDICINE | Facility: CLINIC | Age: 39
End: 2019-07-10

## 2019-07-10 NOTE — TELEPHONE ENCOUNTER
Spoke with pt, she will see [facility] on next Wednesday. She say to tell you thanks for helping her that is very sweet of you.

## 2019-07-17 ENCOUNTER — OFFICE VISIT (OUTPATIENT)
Dept: INTERNAL MEDICINE | Facility: CLINIC | Age: 39
End: 2019-07-17
Payer: COMMERCIAL

## 2019-07-17 ENCOUNTER — CLINICAL SUPPORT (OUTPATIENT)
Dept: INTERNAL MEDICINE | Facility: CLINIC | Age: 39
End: 2019-07-17
Payer: COMMERCIAL

## 2019-07-17 VITALS
DIASTOLIC BLOOD PRESSURE: 74 MMHG | BODY MASS INDEX: 22.11 KG/M2 | WEIGHT: 120.13 LBS | SYSTOLIC BLOOD PRESSURE: 102 MMHG | HEIGHT: 62 IN | HEART RATE: 72 BPM

## 2019-07-17 DIAGNOSIS — Z00.00 WELLNESS EXAMINATION: Primary | ICD-10-CM

## 2019-07-17 DIAGNOSIS — Z00.00 ROUTINE GENERAL MEDICAL EXAMINATION AT A HEALTH CARE FACILITY: Primary | ICD-10-CM

## 2019-07-17 PROBLEM — Z86.32 HISTORY OF GESTATIONAL DIABETES: Status: RESOLVED | Noted: 2018-02-14 | Resolved: 2019-07-17

## 2019-07-17 PROBLEM — E03.8 SUBCLINICAL HYPOTHYROIDISM: Status: RESOLVED | Noted: 2018-02-14 | Resolved: 2019-07-17

## 2019-07-17 PROBLEM — O00.90 ECTOPIC PREGNANCY WITHOUT INTRAUTERINE PREGNANCY: Status: RESOLVED | Noted: 2018-08-22 | Resolved: 2019-07-17

## 2019-07-17 PROBLEM — M65.4 DE QUERVAIN'S TENOSYNOVITIS, BILATERAL: Status: RESOLVED | Noted: 2018-04-11 | Resolved: 2019-07-17

## 2019-07-17 PROBLEM — E55.9 VITAMIN D INSUFFICIENCY: Status: RESOLVED | Noted: 2018-01-22 | Resolved: 2019-07-17

## 2019-07-17 PROBLEM — J30.2 SEASONAL ALLERGIES: Status: ACTIVE | Noted: 2019-07-17

## 2019-07-17 LAB
ALBUMIN SERPL BCP-MCNC: 3.7 G/DL (ref 3.5–5.2)
ALP SERPL-CCNC: 42 U/L (ref 55–135)
ALT SERPL W/O P-5'-P-CCNC: 16 U/L (ref 10–44)
ANION GAP SERPL CALC-SCNC: 7 MMOL/L (ref 8–16)
AST SERPL-CCNC: 16 U/L (ref 10–40)
BILIRUB SERPL-MCNC: 0.7 MG/DL (ref 0.1–1)
BUN SERPL-MCNC: 17 MG/DL (ref 6–20)
CALCIUM SERPL-MCNC: 8.7 MG/DL (ref 8.7–10.5)
CHLORIDE SERPL-SCNC: 108 MMOL/L (ref 95–110)
CHOLEST SERPL-MCNC: 171 MG/DL (ref 120–199)
CHOLEST/HDLC SERPL: 3.1 {RATIO} (ref 2–5)
CO2 SERPL-SCNC: 25 MMOL/L (ref 23–29)
CREAT SERPL-MCNC: 0.8 MG/DL (ref 0.5–1.4)
ERYTHROCYTE [DISTWIDTH] IN BLOOD BY AUTOMATED COUNT: 12.4 % (ref 11.5–14.5)
EST. GFR  (AFRICAN AMERICAN): >60 ML/MIN/1.73 M^2
EST. GFR  (NON AFRICAN AMERICAN): >60 ML/MIN/1.73 M^2
ESTIMATED AVG GLUCOSE: 105 MG/DL (ref 68–131)
GLUCOSE SERPL-MCNC: 81 MG/DL (ref 70–110)
HBA1C MFR BLD HPLC: 5.3 % (ref 4–5.6)
HCT VFR BLD AUTO: 37.6 % (ref 37–48.5)
HDLC SERPL-MCNC: 55 MG/DL (ref 40–75)
HDLC SERPL: 32.2 % (ref 20–50)
HGB BLD-MCNC: 12.2 G/DL (ref 12–16)
LDLC SERPL CALC-MCNC: 105 MG/DL (ref 63–159)
MCH RBC QN AUTO: 31.1 PG (ref 27–31)
MCHC RBC AUTO-ENTMCNC: 32.4 G/DL (ref 32–36)
MCV RBC AUTO: 96 FL (ref 82–98)
NONHDLC SERPL-MCNC: 116 MG/DL
PLATELET # BLD AUTO: 224 K/UL (ref 150–350)
PMV BLD AUTO: 10 FL (ref 9.2–12.9)
POTASSIUM SERPL-SCNC: 4.1 MMOL/L (ref 3.5–5.1)
PROT SERPL-MCNC: 7.3 G/DL (ref 6–8.4)
RBC # BLD AUTO: 3.92 M/UL (ref 4–5.4)
SODIUM SERPL-SCNC: 140 MMOL/L (ref 136–145)
TRIGL SERPL-MCNC: 55 MG/DL (ref 30–150)
WBC # BLD AUTO: 4.24 K/UL (ref 3.9–12.7)

## 2019-07-17 PROCEDURE — 80053 COMPREHEN METABOLIC PANEL: CPT

## 2019-07-17 PROCEDURE — 99395 PR PREVENTIVE VISIT,EST,18-39: ICD-10-PCS | Mod: S$GLB,,, | Performed by: INTERNAL MEDICINE

## 2019-07-17 PROCEDURE — 80061 LIPID PANEL: CPT

## 2019-07-17 PROCEDURE — 36415 COLL VENOUS BLD VENIPUNCTURE: CPT

## 2019-07-17 PROCEDURE — 99395 PREV VISIT EST AGE 18-39: CPT | Mod: S$GLB,,, | Performed by: INTERNAL MEDICINE

## 2019-07-17 PROCEDURE — 85027 COMPLETE CBC AUTOMATED: CPT

## 2019-07-17 PROCEDURE — 99999 PR PBB SHADOW E&M-EST. PATIENT-LVL III: ICD-10-PCS | Mod: PBBFAC,,, | Performed by: INTERNAL MEDICINE

## 2019-07-17 PROCEDURE — 83036 HEMOGLOBIN GLYCOSYLATED A1C: CPT

## 2019-07-17 PROCEDURE — 99999 PR PBB SHADOW E&M-EST. PATIENT-LVL III: CPT | Mod: PBBFAC,,, | Performed by: INTERNAL MEDICINE

## 2019-07-17 NOTE — LETTER
July 17, 2019    Evelia Mishra  [address] [place] [id number]             [facility]  [address]  [place] [address]  Phone: [phone]  Fax: [phone] Dear Dr. Mishra:    Thank you for allowing me to serve you and perform your Executive Health exam on 7/17/2019.  Attached to this letter you will find a copy of your After Visit Summary which includes a copy of all labs and other results, your current medical problem list (if any), a list of all diagnoses from today (if any), a list of all current medication, your vital signs (including Blood pressure, Height, weight, BMI) and associated plans/goals that we discussed while you were in the office.   All test results will be released to your [facility] account.     You are healthy and have no restrictions to work in any capacity necessary.    If you have any questions or concerns, please don't hesitate to call.    Sincerely,        ANAYA York II, MD

## 2019-07-17 NOTE — PROGRESS NOTES
Evelia Mishra is a 38 y.o. Unknown female who presents for an Executive health visit with no  other complaints today.   All chronic problems as listed in the active problem list have been stable and well controlled recently. The active problem list was reviewed and reconciled today and is up to date as of today.  Patient Active Problem List   Diagnosis    History of positive PPD    Seasonal allergies     Other issues addressed today:None.    She denies any recent ER visits or hospitalizations.     She states that she is compliant with all prescribed medications and she has experienced no side effects. I have reviewed all current medications and updated the current medication list during this encounter.     PMFSH: all information reviewed and updated today.     All labs and tests from earlier today reviewed. Abnormalities (if any) are addressed in the assessment and plan.     Review of Systems   Constitutional: Negative for appetite change, chills, fatigue and fever.   HENT: Negative for congestion, hearing loss, postnasal drip, sore throat and tinnitus.    Eyes: Negative for pain, discharge and visual disturbance.   Respiratory: Negative for cough, chest tightness and wheezing.    Cardiovascular: Negative for chest pain, palpitations and leg swelling.   Gastrointestinal: Negative for abdominal pain, blood in stool, diarrhea, nausea and vomiting.   Genitourinary: Negative for dysuria, frequency and urgency.   Musculoskeletal: Negative for arthralgias and back pain.   Skin: Negative for rash.   Neurological: Negative for dizziness, syncope, numbness and headaches.   Hematological: Negative for adenopathy.   Psychiatric/Behavioral: Negative for dysphoric mood and sleep disturbance. The patient is not nervous/anxious.    All other systems reviewed and are negative.    Vitals:    07/17/19 0957   BP: 102/74   BP Location: Left arm   Patient Position: Sitting   BP Method: Medium (Manual)   Pulse: 72   Weight: 54.5  "kg (120 lb 2.4 oz)   Height: 5' 2" (1.575 m)    Body mass index is 21.98 kg/m².    Physical Exam   Constitutional: She is oriented to person, place, and time. She appears well-developed and well-nourished. No distress.   HENT:   Head: Normocephalic and atraumatic.   Right Ear: External ear normal.   Left Ear: External ear normal.   Neck: No JVD present. No thyromegaly present.   Cardiovascular: Normal rate, regular rhythm and normal heart sounds.   No murmur heard.  Pulmonary/Chest: Effort normal and breath sounds normal.   Abdominal: Soft. Bowel sounds are normal.   Musculoskeletal: She exhibits no edema.   Neurological: She is alert and oriented to person, place, and time.   Skin: Skin is warm and dry.     ASSESSMENT/PLAN:  1. Wellness examination  Healthy female.  No restrictions to work in any capacity necessary.   F/u with PCP as needed and recommended for age.     "

## 2019-07-17 NOTE — PATIENT INSTRUCTIONS
Here are the results of your recent labs and tests.   Any significant abnormalities have been discussed and those recommendations are found in the diagnosis list on this visit summary.      Recent Results (from the past 168 hour(s))   Comprehensive metabolic panel    Collection Time: 07/17/19  9:41 AM   Result Value Ref Range    Sodium 140 136 - 145 mmol/L    Potassium 4.1 3.5 - 5.1 mmol/L    Chloride 108 95 - 110 mmol/L    CO2 25 23 - 29 mmol/L    Glucose 81 70 - 110 mg/dL    BUN, Bld 17 6 - 20 mg/dL    Creatinine 0.8 0.5 - 1.4 mg/dL    Calcium 8.7 8.7 - 10.5 mg/dL    Total Protein 7.3 6.0 - 8.4 g/dL    Albumin 3.7 3.5 - 5.2 g/dL    Total Bilirubin 0.7 0.1 - 1.0 mg/dL    Alkaline Phosphatase 42 (L) 55 - 135 U/L    AST 16 10 - 40 U/L    ALT 16 10 - 44 U/L    Anion Gap 7 (L) 8 - 16 mmol/L    eGFR if African American >60.0 >60 mL/min/1.73 m^2    eGFR if non African American >60.0 >60 mL/min/1.73 m^2   CBC Without Differential    Collection Time: 07/17/19  9:41 AM   Result Value Ref Range    WBC 4.24 3.90 - 12.70 K/uL    RBC 3.92 (L) 4.00 - 5.40 M/uL    Hemoglobin 12.2 12.0 - 16.0 g/dL    Hematocrit 37.6 37.0 - 48.5 %    Mean Corpuscular Volume 96 82 - 98 fL    Mean Corpuscular Hemoglobin 31.1 (H) 27.0 - 31.0 pg    Mean Corpuscular Hemoglobin Conc 32.4 32.0 - 36.0 g/dL    RDW 12.4 11.5 - 14.5 %    Platelets 224 150 - 350 K/uL    MPV 10.0 9.2 - 12.9 fL   Lipid panel    Collection Time: 07/17/19  9:41 AM   Result Value Ref Range    Cholesterol 171 120 - 199 mg/dL    Triglycerides 55 30 - 150 mg/dL    HDL 55 40 - 75 mg/dL    LDL Cholesterol 105.0 63.0 - 159.0 mg/dL    Hdl/Cholesterol Ratio 32.2 20.0 - 50.0 %    Total Cholesterol/HDL Ratio 3.1 2.0 - 5.0    Non-HDL Cholesterol 116 mg/dL   Hemoglobin A1c    Collection Time: 07/17/19  9:41 AM   Result Value Ref Range    Hemoglobin A1C 5.3 4.0 - 5.6 %    Estimated Avg Glucose 105 68 - 131 mg/dL

## 2019-07-18 ENCOUNTER — PATIENT MESSAGE (OUTPATIENT)
Dept: INTERNAL MEDICINE | Facility: CLINIC | Age: 39
End: 2019-07-18

## 2019-08-20 ENCOUNTER — PATIENT MESSAGE (OUTPATIENT)
Dept: OBSTETRICS AND GYNECOLOGY | Facility: CLINIC | Age: 39
End: 2019-08-20

## 2019-08-20 ENCOUNTER — TELEPHONE (OUTPATIENT)
Dept: OBSTETRICS AND GYNECOLOGY | Facility: CLINIC | Age: 39
End: 2019-08-20

## 2019-08-20 DIAGNOSIS — Z30.011 ENCOUNTER FOR INITIAL PRESCRIPTION OF CONTRACEPTIVE PILLS: ICD-10-CM

## 2019-08-20 NOTE — TELEPHONE ENCOUNTER
Called pt and left a voicemail informing pt that we had received a fax from [employer] [facility] for a refill on BCP. Pt will need to schedule an annual.

## 2019-08-21 ENCOUNTER — PATIENT MESSAGE (OUTPATIENT)
Dept: OBSTETRICS AND GYNECOLOGY | Facility: CLINIC | Age: 39
End: 2019-08-21

## 2019-09-16 ENCOUNTER — OFFICE VISIT (OUTPATIENT)
Dept: OPTOMETRY | Facility: CLINIC | Age: 39
End: 2019-09-16
Payer: COMMERCIAL

## 2019-09-16 DIAGNOSIS — H52.13 MYOPIA OF BOTH EYES: Primary | ICD-10-CM

## 2019-09-16 PROCEDURE — 99999 PR PBB SHADOW E&M-EST. PATIENT-LVL II: CPT | Mod: PBBFAC,,, | Performed by: OPTOMETRIST

## 2019-09-16 PROCEDURE — 92014 PR EYE EXAM, EST PATIENT,COMPREHESV: ICD-10-PCS | Mod: S$GLB,,, | Performed by: OPTOMETRIST

## 2019-09-16 PROCEDURE — 92015 DETERMINE REFRACTIVE STATE: CPT | Mod: S$GLB,,, | Performed by: OPTOMETRIST

## 2019-09-16 PROCEDURE — 99999 PR PBB SHADOW E&M-EST. PATIENT-LVL II: ICD-10-PCS | Mod: PBBFAC,,, | Performed by: OPTOMETRIST

## 2019-09-16 PROCEDURE — 92014 COMPRE OPH EXAM EST PT 1/>: CPT | Mod: S$GLB,,, | Performed by: OPTOMETRIST

## 2019-09-16 PROCEDURE — 92015 PR REFRACTION: ICD-10-PCS | Mod: S$GLB,,, | Performed by: OPTOMETRIST

## 2019-09-17 ENCOUNTER — OFFICE VISIT (OUTPATIENT)
Dept: OBSTETRICS AND GYNECOLOGY | Facility: CLINIC | Age: 39
End: 2019-09-17
Payer: COMMERCIAL

## 2019-09-17 VITALS
WEIGHT: 124.13 LBS | SYSTOLIC BLOOD PRESSURE: 104 MMHG | DIASTOLIC BLOOD PRESSURE: 71 MMHG | BODY MASS INDEX: 22.84 KG/M2 | HEIGHT: 62 IN

## 2019-09-17 DIAGNOSIS — Z00.00 WELL WOMAN EXAM WITHOUT GYNECOLOGICAL EXAM: Primary | ICD-10-CM

## 2019-09-17 DIAGNOSIS — D24.2 FIBROADENOMA OF BREAST, LEFT: ICD-10-CM

## 2019-09-17 DIAGNOSIS — Z30.41 ENCOUNTER FOR SURVEILLANCE OF CONTRACEPTIVE PILLS: ICD-10-CM

## 2019-09-17 PROCEDURE — 87624 HPV HI-RISK TYP POOLED RSLT: CPT

## 2019-09-17 PROCEDURE — 99999 PR PBB SHADOW E&M-EST. PATIENT-LVL III: CPT | Mod: PBBFAC,,, | Performed by: NURSE PRACTITIONER

## 2019-09-17 PROCEDURE — 99999 PR PBB SHADOW E&M-EST. PATIENT-LVL III: ICD-10-PCS | Mod: PBBFAC,,, | Performed by: NURSE PRACTITIONER

## 2019-09-17 PROCEDURE — 99395 PR PREVENTIVE VISIT,EST,18-39: ICD-10-PCS | Mod: S$GLB,,, | Performed by: NURSE PRACTITIONER

## 2019-09-17 PROCEDURE — 99395 PREV VISIT EST AGE 18-39: CPT | Mod: S$GLB,,, | Performed by: NURSE PRACTITIONER

## 2019-09-17 PROCEDURE — 88175 CYTOPATH C/V AUTO FLUID REDO: CPT

## 2019-09-17 NOTE — PROGRESS NOTES
HISTORY OF PRESENT ILLNESS:    Evelia Mishra is a 39 y.o. female, [id number], Patient's last menstrual period was 2019 (approximate).,  presents for a routine exam and has no complaints.    Past Medical History:   Diagnosis Date    De Quervain's tenosynovitis, bilateral 2018    Ectopic pregnancy without intrauterine pregnancy 08/22/2018    x2    Fibroadenoma of breast 2013    Left side bilobed - stable since  - smaller since off OCPs     History of gestational diabetes 2018    History of positive PPD 2013    Treated with INH for 9 months.     Pelvic muscle wasting 10/7/2013    Seasonal allergies 2019    Subclinical hypothyroidism 2018    pregnancy related.    Vitamin D insufficiency 2018       Past Surgical History:   Procedure Laterality Date    DILATION-CURETTAGE OF UTERUS (D AND C) N/A 2016    [facility]    LAPAROSCOPY, DIAGNOSTIC N/A 2018    Performed by [facility] at [facility] OR    REMOVAL, ECTOPIC PREGNANCY, LAPAROSCOPIC Left 2018    Performed by [facility]rish [facility] at [facility] OR       MEDICATIONS AND ALLERGIES:      Current Outpatient Medications:     norethindrone-e.estradiol-iron (LO LOESTRIN FE) 1 mg-10 mcg (24)/10 mcg (2) Tab, Take 1 tablet by mouth once daily., Disp: 84 tablet, Rfl: 4    azelastine (ASTELIN) 137 mcg (0.1 %) nasal spray, 1 spray (137 mcg total) by Nasal route 2 (two) times daily., Disp: 30 mL, Rfl: 2    Review of patient's allergies indicates:  No Known Allergies    Family History   Problem Relation Age of Onset    Hyperlipidemia Father     Diabetes Maternal Grandmother     Cataracts Maternal Grandmother     Diabetes Paternal Grandmother     Diabetes Maternal Uncle     Diabetes Paternal Uncle     Hypothyroidism Paternal Cousin     Thyroid disease Neg Hx     Breast cancer Neg Hx     Colon cancer Neg Hx     Ovarian cancer Neg Hx        Social History      Socioeconomic History    Marital status:      Spouse name: Not on file    Number of children: Not on file    Years of education: Not on file    Highest education level: Not on file   Occupational History    Occupation: physician     Employer: [employer]     Comment: Hospital medicine   Social Needs    Financial resource strain: Not on file    Food insecurity:     Worry: Not on file     Inability: Not on file    Transportation needs:     Medical: Not on file     Non-medical: Not on file   Tobacco Use    Smoking status: Never Smoker    Smokeless tobacco: Never Used   Substance and Sexual Activity    Alcohol use: No     Alcohol/week: 0.0 oz    Drug use: No    Sexual activity: Not Currently     Partners: Male     Birth control/protection: None   Lifestyle    Physical activity:     Days per week: Not on file     Minutes per session: Not on file    Stress: Not on file   Relationships    Social connections:     Talks on phone: Not on file     Gets together: Not on file     Attends Yarsani service: Not on file     Active member of club or organization: Not on file     Attends meetings of clubs or organizations: Not on file     Relationship status: Not on file   Other Topics Concern    Are you pregnant or think you may be? No    Breast-feeding No   Social History Narrative    Not on file       OB HISTORY: Number of vaginal deliveries:2 Number of fetal demises:2 (twins) Number of ectopics:1    COMPREHENSIVE GYN HISTORY:  PAP History: Denies abnormal Paps. LAST PAP 9-28-16 NORMAL.  Infection History: Denies STDs. Denies PID.  Benign History: Denies uterine fibroids. Denies ovarian cysts. Denies endometriosis. Denies other conditions.  Cancer History: Denies cervical cancer. Denies uterine cancer or hyperplasia. Denies ovarian cancer. Denies vulvar cancer or pre-cancer. Denies vaginal cancer or pre-cancer. Denies breast cancer. Denies colon cancer.  Sexual Activity History:  "Reports currently being sexually active  Menstrual History: Monthly. Mod then light flow.   Dysmenorrhea History: Reports mild dysmenorrhea.   Contraception: OCPS.    ROS:  GENERAL: No weight changes. No swelling. No fatigue. No fever.  CARDIOVASCULAR: No chest pain. No shortness of breath. No leg cramps.   NEUROLOGICAL: No headaches. No vision changes.  BREASTS: No pain. No lumps. No discharge.  ABDOMEN: No pain. No nausea. No vomiting. No diarrhea. No constipation.  REPRODUCTIVE: No abnormal bleeding.   VULVA: No pain. No lesions. No itching.  VAGINA: No relaxation. No itching. No odor. No discharge. No lesions.  URINARY: No incontinence. No nocturia. No frequency. No dysuria.    /71   Ht 5' 2" (1.575 m)   Wt 56.3 kg (124 lb 1.9 oz)   LMP 09/03/2019 (Approximate)   BMI 22.70 kg/m²     PE:  APPEARANCE: Well nourished, well developed, in no acute distress.  AFFECT: WNL, alert and oriented x 3.  SKIN: No acne or hirsutism.  NECK: Neck symmetric, without masses or thyromegaly.  NODES: No inguinal, cervical, axillary or femoral lymph node enlargement.  CHEST: Good respiratory effort.   ABDOMEN: Soft. No tenderness or masses.   BREASTS: Symmetrical, no skin changes, visible lesions or nipple discharge bilaterally. BILATERAL F.C. CHANGES. There is a BILOPED MOBILE SUPERFICIAL MASS ABOVE THE AREOLA on the LEFT (FIBROADENOMA, UNCHANGED)..   PELVIC: External female genitalia without lesions.  Female hair distribution. Adequate perineal body, Normal urethral meatus. Vagina moist and well rugated without lesions or discharge.  No significant cystocele or rectocele present. Cervix pink without lesions, discharge or tenderness. Uterus is 8 week size, regular, mobile and nontender. Adnexa without masses or tenderness.  EXTREMITIES: No edema    DIAGNOSIS:  1. Well woman exam without gynecological exam    2. Encounter for surveillance of contraceptive pills    3. Fibroadenoma of breast, left        PLAN:    Orders Placed " This Encounter    HPV High Risk Genotypes, PCR    Liquid-based pap smear, screening    norethindrone-e.estradiol-iron (LO LOESTRIN FE) 1 mg-10 mcg (24)/10 mcg (2) Tab       COUNSELING:  The patient was counseled today on:  -OCP use and potential side effects;  -A.C.S. Pap and pelvic exam guidelines (pap every 3 years);  -to follow up with her PCP for other health maintenance.    FOLLOW-UP with Dr Adams annually.

## 2019-09-19 ENCOUNTER — OFFICE VISIT (OUTPATIENT)
Dept: ORTHOPEDICS | Facility: CLINIC | Age: 39
End: 2019-09-19
Payer: COMMERCIAL

## 2019-09-19 ENCOUNTER — HOSPITAL ENCOUNTER (OUTPATIENT)
Dept: RADIOLOGY | Facility: HOSPITAL | Age: 39
Discharge: HOME | End: 2019-09-19
Attending: ORTHOPAEDIC SURGERY
Payer: COMMERCIAL

## 2019-09-19 VITALS — HEIGHT: 62 IN | BODY MASS INDEX: 22.82 KG/M2 | WEIGHT: 124 LBS

## 2019-09-19 DIAGNOSIS — M25.531 RIGHT WRIST PAIN: Primary | ICD-10-CM

## 2019-09-19 DIAGNOSIS — M25.531 RIGHT WRIST PAIN: ICD-10-CM

## 2019-09-19 PROCEDURE — 73100 XR WRIST 2 VIEW RIGHT: ICD-10-PCS | Mod: 26,RT,, | Performed by: RADIOLOGY

## 2019-09-19 PROCEDURE — 99213 PR OFFICE/OUTPT VISIT, EST, LEVL III, 20-29 MIN: ICD-10-PCS | Mod: 25,S$GLB,, | Performed by: ORTHOPAEDIC SURGERY

## 2019-09-19 PROCEDURE — 20605 PR DRAIN/INJECT INTERMEDIATE JOINT/BURSA: ICD-10-PCS | Mod: RT,S$GLB,, | Performed by: ORTHOPAEDIC SURGERY

## 2019-09-19 PROCEDURE — 3008F BODY MASS INDEX DOCD: CPT | Mod: CPTII,S$GLB,, | Performed by: ORTHOPAEDIC SURGERY

## 2019-09-19 PROCEDURE — 99999 PR PBB SHADOW E&M-EST. PATIENT-LVL III: CPT | Mod: PBBFAC,,, | Performed by: ORTHOPAEDIC SURGERY

## 2019-09-19 PROCEDURE — 73100 X-RAY EXAM OF WRIST: CPT | Mod: 26,RT,, | Performed by: RADIOLOGY

## 2019-09-19 PROCEDURE — 3008F PR BODY MASS INDEX (BMI) DOCUMENTED: ICD-10-PCS | Mod: CPTII,S$GLB,, | Performed by: ORTHOPAEDIC SURGERY

## 2019-09-19 PROCEDURE — 99213 OFFICE O/P EST LOW 20 MIN: CPT | Mod: 25,S$GLB,, | Performed by: ORTHOPAEDIC SURGERY

## 2019-09-19 PROCEDURE — 20605 DRAIN/INJ JOINT/BURSA W/O US: CPT | Mod: RT,S$GLB,, | Performed by: ORTHOPAEDIC SURGERY

## 2019-09-19 PROCEDURE — 99999 PR PBB SHADOW E&M-EST. PATIENT-LVL III: ICD-10-PCS | Mod: PBBFAC,,, | Performed by: ORTHOPAEDIC SURGERY

## 2019-09-19 PROCEDURE — 73100 X-RAY EXAM OF WRIST: CPT | Mod: TC,PN,RT

## 2019-09-19 RX ORDER — TRIAMCINOLONE ACETONIDE 40 MG/ML
20 INJECTION, SUSPENSION INTRA-ARTICULAR; INTRAMUSCULAR
Status: COMPLETED | OUTPATIENT
Start: 2019-09-19 | End: 2019-09-19

## 2019-09-19 RX ADMIN — TRIAMCINOLONE ACETONIDE 20 MG: 40 INJECTION, SUSPENSION INTRA-ARTICULAR; INTRAMUSCULAR at 10:09

## 2019-09-19 NOTE — PROGRESS NOTES
"Subjective:      Patient ID: Evelia Mishra is a 39 y.o. female.  Chief Complaint: Pain of the Right Wrist      HPI  Evelia Mishra is a  39 y.o. female presenting today for follow up of bilateral wrist pain.  She reports that she is having new onset right wrist pain previously which she was seen last year her symptoms resolved from de Quervain this seems to be a different area she is describing posterior or dorsal wrist pain in the right wrist no numbness or tingling reported no trauma reported.    Review of patient's allergies indicates:  No Known Allergies      Current Outpatient Medications   Medication Sig Dispense Refill    norethindrone-e.estradiol-iron (LO LOESTRIN FE) 1 mg-10 mcg (24)/10 mcg (2) Tab Take 1 tablet by mouth once daily. 84 tablet 4    azelastine (ASTELIN) 137 mcg (0.1 %) nasal spray 1 spray (137 mcg total) by Nasal route 2 (two) times daily. 30 mL 2     No current facility-administered medications for this visit.        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     Pelvic muscle wasting 10/7/2013    Seasonal allergies 7/17/2019    Subclinical hypothyroidism 02/14/2018    pregnancy related.    Vitamin D insufficiency 1/22/2018       Past Surgical History:   Procedure Laterality Date    DILATION-CURETTAGE OF UTERUS (D AND C) N/A 11/25/2016    [facility] L[facility]D    LAPAROSCOPY, DIAGNOSTIC N/A 8/22/2018    [facility]    REMOVAL, ECTOPIC PREGNANCY, LAPAROSCOPIC Left 8/22/2018    Performed by [facility]rish [facility] at [facility] OR       OBJECTIVE:   PHYSICAL EXAM:  Height: 5' 2" (157.5 cm) " "Weight: 56.2 kg (124 lb)  Vitals:    09/19/19 1016 09/19/19 1017   Weight: 56.2 kg (124 lb)    Height: 5' 2" (1.575 m)    PainSc:   4   4   PainLoc: Wrist      Ortho/SPM Exam  Physical examination right wrist there is no swelling there is some tenderness over the scapholunate interval dorsally  Range of motion wrist full slight pain with extreme flexion  No mast noted  Tinel sign negative  Finkelstein test negative    RADIOGRAPHS:  AP lateral x-ray right wrist demonstrates no abnormalities  Comments: I have personally reviewed the imaging and I agree with the above radiologist's report.    ASSESSMENT/PLAN:     IMPRESSION:  Right wrist pain    PLAN:  Recommended injection today.  After pause for time-out identified the right wrist injected dorsal capsule combination Kenalog 20 mg 0.5 cc xylocaine sterile technique  Tolerated the procedure well without complication  I have also given her a wrist splint for part-time use  Follow-up 4-6 weeks    FOLLOW UP:  4-6 weeks    Disclaimer: This note has been generated using voice-recognition software. There may be typographical errors that have been missed during proof-reading.  "

## 2019-09-20 LAB
HPV HR 12 DNA CVX QL NAA+PROBE: NEGATIVE
HPV16 AG SPEC QL: NEGATIVE
HPV18 DNA SPEC QL NAA+PROBE: NEGATIVE

## 2019-09-24 ENCOUNTER — TELEPHONE (OUTPATIENT)
Dept: INTERNAL MEDICINE | Facility: CLINIC | Age: 39
End: 2019-09-24

## 2019-09-24 DIAGNOSIS — J30.2 SEASONAL ALLERGIES: Primary | ICD-10-CM

## 2019-09-24 NOTE — TELEPHONE ENCOUNTER
----- Message from Safia Meeks sent at 9/24/2019 10:26 AM CDT -----  Contact: self   Patient is calling to see if she has had a chest x-ray in the last 10 years. Patient states she needs to provide the information to get credentialed with [facility] extended care.  Please call and advise

## 2019-09-24 NOTE — TELEPHONE ENCOUNTER
I advised patient she did not have a chest x-ray on file, she will call back to schedule if needed

## 2019-10-29 ENCOUNTER — PATIENT OUTREACH (OUTPATIENT)
Dept: ADMINISTRATIVE | Facility: OTHER | Age: 39
End: 2019-10-29

## 2020-01-23 ENCOUNTER — PATIENT MESSAGE (OUTPATIENT)
Dept: OBSTETRICS AND GYNECOLOGY | Facility: CLINIC | Age: 40
End: 2020-01-23

## 2020-01-23 ENCOUNTER — TELEPHONE (OUTPATIENT)
Dept: INTERNAL MEDICINE | Facility: CLINIC | Age: 40
End: 2020-01-23

## 2020-01-28 ENCOUNTER — PATIENT OUTREACH (OUTPATIENT)
Dept: ADMINISTRATIVE | Facility: OTHER | Age: 40
End: 2020-01-28

## 2020-02-03 ENCOUNTER — PATIENT MESSAGE (OUTPATIENT)
Dept: PODIATRY | Facility: CLINIC | Age: 40
End: 2020-02-03

## 2020-04-21 DIAGNOSIS — Z01.84 ANTIBODY RESPONSE EXAMINATION: ICD-10-CM

## 2020-05-21 DIAGNOSIS — Z01.84 ANTIBODY RESPONSE EXAMINATION: ICD-10-CM

## 2020-06-20 DIAGNOSIS — Z01.84 ANTIBODY RESPONSE EXAMINATION: ICD-10-CM

## 2020-07-20 DIAGNOSIS — Z01.84 ANTIBODY RESPONSE EXAMINATION: ICD-10-CM

## 2020-08-13 ENCOUNTER — OFFICE VISIT (OUTPATIENT)
Dept: INTERNAL MEDICINE | Facility: CLINIC | Age: 40
End: 2020-08-13
Payer: COMMERCIAL

## 2020-08-13 ENCOUNTER — CLINICAL SUPPORT (OUTPATIENT)
Dept: INTERNAL MEDICINE | Facility: CLINIC | Age: 40
End: 2020-08-13
Payer: COMMERCIAL

## 2020-08-13 VITALS
TEMPERATURE: 98 F | HEART RATE: 74 BPM | SYSTOLIC BLOOD PRESSURE: 102 MMHG | WEIGHT: 132 LBS | DIASTOLIC BLOOD PRESSURE: 72 MMHG | BODY MASS INDEX: 24.14 KG/M2

## 2020-08-13 DIAGNOSIS — R79.89 ELEVATED TSH: ICD-10-CM

## 2020-08-13 DIAGNOSIS — Z00.00 ROUTINE GENERAL MEDICAL EXAMINATION AT A HEALTH CARE FACILITY: Primary | ICD-10-CM

## 2020-08-13 LAB
ALBUMIN SERPL BCP-MCNC: 3.6 G/DL (ref 3.5–5.2)
ALP SERPL-CCNC: 42 U/L (ref 55–135)
ALT SERPL W/O P-5'-P-CCNC: 21 U/L (ref 10–44)
ANION GAP SERPL CALC-SCNC: 6 MMOL/L (ref 8–16)
AST SERPL-CCNC: 16 U/L (ref 10–40)
BILIRUB SERPL-MCNC: 0.4 MG/DL (ref 0.1–1)
BUN SERPL-MCNC: 18 MG/DL (ref 6–20)
CALCIUM SERPL-MCNC: 8.5 MG/DL (ref 8.7–10.5)
CHLORIDE SERPL-SCNC: 107 MMOL/L (ref 95–110)
CHOLEST SERPL-MCNC: 187 MG/DL (ref 120–199)
CHOLEST/HDLC SERPL: 3.7 {RATIO} (ref 2–5)
CO2 SERPL-SCNC: 26 MMOL/L (ref 23–29)
CREAT SERPL-MCNC: 0.8 MG/DL (ref 0.5–1.4)
ERYTHROCYTE [DISTWIDTH] IN BLOOD BY AUTOMATED COUNT: 12.1 % (ref 11.5–14.5)
EST. GFR  (AFRICAN AMERICAN): >60 ML/MIN/1.73 M^2
EST. GFR  (NON AFRICAN AMERICAN): >60 ML/MIN/1.73 M^2
ESTIMATED AVG GLUCOSE: 108 MG/DL (ref 68–131)
GLUCOSE SERPL-MCNC: 95 MG/DL (ref 70–110)
HBA1C MFR BLD HPLC: 5.4 % (ref 4–5.6)
HCT VFR BLD AUTO: 38.8 % (ref 37–48.5)
HDLC SERPL-MCNC: 51 MG/DL (ref 40–75)
HDLC SERPL: 27.3 % (ref 20–50)
HGB BLD-MCNC: 12.6 G/DL (ref 12–16)
LDLC SERPL CALC-MCNC: 118.8 MG/DL (ref 63–159)
MCH RBC QN AUTO: 31.7 PG (ref 27–31)
MCHC RBC AUTO-ENTMCNC: 32.5 G/DL (ref 32–36)
MCV RBC AUTO: 98 FL (ref 82–98)
NONHDLC SERPL-MCNC: 136 MG/DL
PLATELET # BLD AUTO: 227 K/UL (ref 150–350)
PMV BLD AUTO: 9.9 FL (ref 9.2–12.9)
POTASSIUM SERPL-SCNC: 3.9 MMOL/L (ref 3.5–5.1)
PROT SERPL-MCNC: 7.2 G/DL (ref 6–8.4)
RBC # BLD AUTO: 3.97 M/UL (ref 4–5.4)
SARS-COV-2 IGG SERPLBLD QL IA.RAPID: NEGATIVE
SODIUM SERPL-SCNC: 139 MMOL/L (ref 136–145)
T4 FREE SERPL-MCNC: 0.98 NG/DL (ref 0.71–1.51)
TRIGL SERPL-MCNC: 86 MG/DL (ref 30–150)
TSH SERPL DL<=0.005 MIU/L-ACNC: 4.43 UIU/ML (ref 0.4–4)
WBC # BLD AUTO: 6.26 K/UL (ref 3.9–12.7)

## 2020-08-13 PROCEDURE — 83036 HEMOGLOBIN GLYCOSYLATED A1C: CPT

## 2020-08-13 PROCEDURE — 99999 PR PBB SHADOW E&M-EST. PATIENT-LVL I: ICD-10-PCS | Mod: PBBFAC,,,

## 2020-08-13 PROCEDURE — 84443 ASSAY THYROID STIM HORMONE: CPT

## 2020-08-13 PROCEDURE — 80061 LIPID PANEL: CPT

## 2020-08-13 PROCEDURE — 85027 COMPLETE CBC AUTOMATED: CPT

## 2020-08-13 PROCEDURE — 99999 PR PBB SHADOW E&M-EST. PATIENT-LVL III: CPT | Mod: PBBFAC,,, | Performed by: INTERNAL MEDICINE

## 2020-08-13 PROCEDURE — 84439 ASSAY OF FREE THYROXINE: CPT

## 2020-08-13 PROCEDURE — 99999 PR PBB SHADOW E&M-EST. PATIENT-LVL III: ICD-10-PCS | Mod: PBBFAC,,, | Performed by: INTERNAL MEDICINE

## 2020-08-13 PROCEDURE — 99395 PREV VISIT EST AGE 18-39: CPT | Mod: S$GLB,,, | Performed by: INTERNAL MEDICINE

## 2020-08-13 PROCEDURE — 99999 PR PBB SHADOW E&M-EST. PATIENT-LVL I: CPT | Mod: PBBFAC,,,

## 2020-08-13 PROCEDURE — 80053 COMPREHEN METABOLIC PANEL: CPT

## 2020-08-13 PROCEDURE — 86769 SARS-COV-2 COVID-19 ANTIBODY: CPT

## 2020-08-13 PROCEDURE — 36415 COLL VENOUS BLD VENIPUNCTURE: CPT

## 2020-08-13 PROCEDURE — 99395 PR PREVENTIVE VISIT,EST,18-39: ICD-10-PCS | Mod: S$GLB,,, | Performed by: INTERNAL MEDICINE

## 2020-08-13 NOTE — PROGRESS NOTES
Subjective:       Patient ID: Evelia Mishra is a 39 y.o. female.    Chief Complaint: Annual Exam    Patient is a 39-year-old female here for annual physical exam.  She has no complaints other than some mild weight gain and she has dressing it with dietary changes including decreasing portions of carbohydrates as well as increasing the exercise in her daily activities.  She does have a strong family history significant for diabetes.  She has also had abnormal TSH in the past.  Her lab work was not completely back upon her leaving her office visit but her CMP, see BC and lipid profile are all within normal limits.  Her TSH was slightly elevated and her A1c was normal      Review of Systems   Constitutional: Negative for activity change, appetite change, chills, diaphoresis, fatigue, fever and unexpected weight change.   HENT: Negative for nasal congestion, ear pain, mouth sores, postnasal drip, sinus pressure/congestion, sore throat and trouble swallowing.    Eyes: Negative for pain, redness and visual disturbance.   Respiratory: Negative for apnea, cough, chest tightness, shortness of breath and wheezing.    Cardiovascular: Negative for chest pain, palpitations and leg swelling.   Gastrointestinal: Negative for abdominal distention, abdominal pain, blood in stool, constipation, diarrhea, nausea and vomiting.   Endocrine: Negative for cold intolerance, polydipsia, polyphagia and polyuria.   Genitourinary: Negative for difficulty urinating, dysuria, flank pain, frequency, hematuria, menstrual problem, pelvic pain and urgency.   Musculoskeletal: Negative for arthralgias, back pain, joint swelling and neck pain.   Integumentary:  Negative for color change, rash and wound.   Neurological: Negative for dizziness, tremors, seizures, syncope, weakness, light-headedness, numbness and headaches.   Hematological: Negative for adenopathy. Does not bruise/bleed easily.   Psychiatric/Behavioral: Negative for confusion,  decreased concentration, dysphoric mood, hallucinations, self-injury, sleep disturbance and suicidal ideas. The patient is not nervous/anxious.          Objective:      Physical Exam  Vitals signs and nursing note reviewed.   Constitutional:       Appearance: She is well-developed.   HENT:      Head: Normocephalic and atraumatic.   Eyes:      General: No scleral icterus.        Right eye: No discharge.         Left eye: No discharge.   Neck:      Musculoskeletal: Neck supple.      Vascular: No JVD.   Cardiovascular:      Rate and Rhythm: Normal rate and regular rhythm.      Heart sounds: No murmur. No friction rub. No gallop.    Pulmonary:      Effort: Pulmonary effort is normal.      Breath sounds: Normal breath sounds. No wheezing or rales.   Abdominal:      General: Bowel sounds are normal. There is no distension.      Palpations: Abdomen is soft.      Tenderness: There is no abdominal tenderness.   Musculoskeletal:         General: No tenderness.   Lymphadenopathy:      Cervical: No cervical adenopathy.   Skin:     Findings: No erythema or rash.   Neurological:      Mental Status: She is alert and oriented to person, place, and time.      Cranial Nerves: No cranial nerve deficit.      Deep Tendon Reflexes: Reflexes are normal and symmetric.         Assessment:       1. Routine general medical examination at a health care facility    2. Elevated TSH        Plan:       Routine general medical examination at a health care facility     Health Maintenance   Topic Date Due    TETANUS VACCINE  10/31/2027    Hepatitis C Screening  Completed    Lipid Panel  Completed     Health Maintenance Review    Elevated TSH   Would recheck in 4-6 months to assure stability or we could do a low-dose supplement

## 2020-08-13 NOTE — LETTER
August 14, 2020    Evelia Louis Danica  [address] [id number]             [facility]  [address] [place] [address], [address] [place] [id number]  Phone: [phone]  Fax: [phone] Dear Dr. Mishra:    8/13/2020    Evelia Mishra  [address] [address]       [facility]  [address] [place] [address], [address] [place] [id number]  Phone: [phone]  Fax: [phone] Dear Dr. Mishra:    Thank you for allowing me to serve you and perform your [facility] exam on 8/13/2020.  This letter will serve a brief summary of the history, findings and discussions at that time. I have included the lab and study results for you to have available at future healthcare appointments.      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     Pelvic muscle wasting 10/7/2013    Seasonal allergies 7/17/2019    Subclinical hypothyroidism 02/14/2018    pregnancy related.    Vitamin D insufficiency 1/22/2018          Medications:    Current Outpatient Medications:     azelastine (ASTELIN) 137 mcg (0.1 %) nasal spray, 1 spray (137 mcg total) by Nasal route 2 (two) times daily., Disp: 30 mL, Rfl: 2    norethindrone-e.estradiol-iron (LO LOESTRIN FE) 1 mg-10 mcg (24)/10 mcg (2) Tab, Take 1 tablet by mouth once daily., Disp: 84 tablet, Rfl: 4       Physical Exam:   No abnormalities appreciated on physical exam    Labs:  Recent Results (from the past 168 hour(s))   Comprehensive metabolic panel    Collection Time: 08/13/20  8:12 AM    Result Value Ref Range    Sodium 139 136 - 145 mmol/L    Potassium 3.9 3.5 - 5.1 mmol/L    Chloride 107 95 - 110 mmol/L    CO2 26 23 - 29 mmol/L    Glucose 95 70 - 110 mg/dL    BUN, Bld 18 6 - 20 mg/dL    Creatinine 0.8 0.5 - 1.4 mg/dL    Calcium 8.5 (L) 8.7 - 10.5 mg/dL    Total Protein 7.2 6.0 - 8.4 g/dL    Albumin 3.6 3.5 - 5.2 g/dL    Total Bilirubin 0.4 0.1 - 1.0 mg/dL    Alkaline Phosphatase 42 (L) 55 - 135 U/L    AST 16 10 - 40 U/L    ALT 21 10 - 44 U/L    Anion Gap 6 (L) 8 - 16 mmol/L    eGFR if African American >60.0 >60 mL/min/1.73 m^2    eGFR if non African American >60.0 >60 mL/min/1.73 m^2   CBC Without Differential    Collection Time: 08/13/20  8:12 AM   Result Value Ref Range    WBC 6.26 3.90 - 12.70 K/uL    RBC 3.97 (L) 4.00 - 5.40 M/uL    Hemoglobin 12.6 12.0 - 16.0 g/dL    Hematocrit 38.8 37.0 - 48.5 %    Mean Corpuscular Volume 98 82 - 98 fL    Mean Corpuscular Hemoglobin 31.7 (H) 27.0 - 31.0 pg    Mean Corpuscular Hemoglobin Conc 32.5 32.0 - 36.0 g/dL    RDW 12.1 11.5 - 14.5 %    Platelets 227 150 - 350 K/uL    MPV 9.9 9.2 - 12.9 fL   Lipid panel    Collection Time: 08/13/20  8:12 AM   Result Value Ref Range    Cholesterol 187 120 - 199 mg/dL    Triglycerides 86 30 - 150 mg/dL    HDL 51 40 - 75 mg/dL    LDL Cholesterol 118.8 63.0 - 159.0 mg/dL    Hdl/Cholesterol Ratio 27.3 20.0 - 50.0 %    Total Cholesterol/HDL Ratio 3.7 2.0 - 5.0    Non-HDL Cholesterol 136 mg/dL   Hemoglobin A1c    Collection Time: 08/13/20  8:12 AM   Result Value Ref Range    Hemoglobin A1C 5.4 4.0 - 5.6 %    Estimated Avg Glucose 108 68 - 131 mg/dL   TSH    Collection Time: 08/13/20  8:12 AM   Result Value Ref Range    TSH 4.431 (H) 0.400 - 4.000 uIU/mL   COVID-19 (SARS CoV-2) IgG Antibody    Collection Time: 08/13/20  8:12 AM   Result Value Ref Range    Antibody SARS CoV-2 Negative Negative   T4, free    Collection Time: 08/13/20  8:12 AM   Result Value Ref Range    Free T4 0.98 0.71 - 1.51 ng/dL          Assessment/Recommendations:  Plan:       Routine general medical examination at a health care facility-      Health Maintenance   Topic Date Due    TETANUS VACCINE  10/31/2027    Hepatitis C Screening  Completed    Lipid Panel  Completed     Health Maintenance Reviewed and up to date    Elevated TSH- I saw that you have had a mild elevation in the past.    I will leave it up to you if you would like to recheck in 4-6 months or we could do a low-dose supplement. Just let me know. My office number which goes to my phone email is [phone]          It was a pleasure meeting you for your health exam.    If you have any questions or concerns, please don't hesitate to call.    Sincerely,    Louise Serrano MD

## 2020-08-27 ENCOUNTER — TELEPHONE (OUTPATIENT)
Dept: INTERNAL MEDICINE | Facility: CLINIC | Age: 40
End: 2020-08-27

## 2020-08-27 DIAGNOSIS — R79.89 ELEVATED TSH: Primary | ICD-10-CM

## 2020-08-27 NOTE — TELEPHONE ENCOUNTER
Carrol,   Can you book labs here for thyroid functions in early December for this doctor? I will order and mail out but we may need to have marlen book it.

## 2020-09-25 ENCOUNTER — PATIENT MESSAGE (OUTPATIENT)
Dept: OBSTETRICS AND GYNECOLOGY | Facility: CLINIC | Age: 40
End: 2020-09-25

## 2020-09-28 ENCOUNTER — PATIENT OUTREACH (OUTPATIENT)
Dept: ADMINISTRATIVE | Facility: OTHER | Age: 40
End: 2020-09-28

## 2020-09-28 DIAGNOSIS — Z30.41 ENCOUNTER FOR SURVEILLANCE OF CONTRACEPTIVE PILLS: ICD-10-CM

## 2020-09-29 ENCOUNTER — PATIENT MESSAGE (OUTPATIENT)
Dept: OBSTETRICS AND GYNECOLOGY | Facility: CLINIC | Age: 40
End: 2020-09-29

## 2020-09-29 ENCOUNTER — OFFICE VISIT (OUTPATIENT)
Dept: PODIATRY | Facility: CLINIC | Age: 40
End: 2020-09-29
Payer: COMMERCIAL

## 2020-09-29 VITALS
HEART RATE: 80 BPM | SYSTOLIC BLOOD PRESSURE: 148 MMHG | WEIGHT: 132 LBS | BODY MASS INDEX: 24.29 KG/M2 | DIASTOLIC BLOOD PRESSURE: 78 MMHG | HEIGHT: 62 IN

## 2020-09-29 DIAGNOSIS — M24.573 EQUINUS CONTRACTURE OF ANKLE: ICD-10-CM

## 2020-09-29 DIAGNOSIS — M79.672 FOOT PAIN, LEFT: ICD-10-CM

## 2020-09-29 DIAGNOSIS — M72.2 PLANTAR FASCIITIS: Primary | ICD-10-CM

## 2020-09-29 PROCEDURE — 99213 OFFICE O/P EST LOW 20 MIN: CPT | Mod: 25,S$GLB,, | Performed by: PODIATRIST

## 2020-09-29 PROCEDURE — 29540 PR STRAPPING; ANKLE &/OR FOOT: ICD-10-PCS | Mod: LT,S$GLB,, | Performed by: PODIATRIST

## 2020-09-29 PROCEDURE — 99999 PR PBB SHADOW E&M-EST. PATIENT-LVL III: ICD-10-PCS | Mod: PBBFAC,,, | Performed by: PODIATRIST

## 2020-09-29 PROCEDURE — 29540 STRAPPING ANKLE &/FOOT: CPT | Mod: LT,S$GLB,, | Performed by: PODIATRIST

## 2020-09-29 PROCEDURE — 3008F BODY MASS INDEX DOCD: CPT | Mod: CPTII,S$GLB,, | Performed by: PODIATRIST

## 2020-09-29 PROCEDURE — 3008F PR BODY MASS INDEX (BMI) DOCUMENTED: ICD-10-PCS | Mod: CPTII,S$GLB,, | Performed by: PODIATRIST

## 2020-09-29 PROCEDURE — 99999 PR PBB SHADOW E&M-EST. PATIENT-LVL III: CPT | Mod: PBBFAC,,, | Performed by: PODIATRIST

## 2020-09-29 PROCEDURE — 99213 PR OFFICE/OUTPT VISIT, EST, LEVL III, 20-29 MIN: ICD-10-PCS | Mod: 25,S$GLB,, | Performed by: PODIATRIST

## 2020-09-29 NOTE — PROGRESS NOTES
Health Maintenance Due   Topic Date Due    Influenza Vaccine (1) 08/01/2020     Updates were requested from care everywhere.  Chart was reviewed for overdue Proactive [facility] Encounters ([facility]) topics (CRS, Breast Cancer Screening, Eye exam)  Health Maintenance has been updated.  LINKS immunization registry triggered.  Immunizations were reconciled.

## 2020-09-29 NOTE — PROGRESS NOTES
Subjective:      Patient ID: Evelia Mishra is a 40 y.o. female.    Chief Complaint: Toe Pain (Left hallux)    Sharp burning pain, numbness left arch, 1st mtp, and great toe.  Gradual onset, worsening over past several weeks, aggravated by increased weight bearing, shoe gear, pressure.  No previous medical treatment.  OTC pain med not helping. Denies trauma, surgery.    Review of Systems   Constitution: Negative for chills, diaphoresis, fever, malaise/fatigue and night sweats.   Cardiovascular: Negative for claudication, cyanosis, leg swelling and syncope.   Skin: Negative for color change, dry skin, nail changes, rash, suspicious lesions and unusual hair distribution.   Musculoskeletal: Positive for joint pain. Negative for falls, joint swelling, muscle cramps, muscle weakness and stiffness.   Gastrointestinal: Negative for constipation, diarrhea, nausea and vomiting.   Neurological: Positive for sensory change. Negative for brief paralysis, disturbances in coordination, focal weakness, numbness, paresthesias and tremors.           Objective:      Physical Exam  Constitutional:       General: She is not in acute distress.     Appearance: She is well-developed. She is not diaphoretic.   Cardiovascular:      Pulses:           Popliteal pulses are 2+ on the right side and 2+ on the left side.        Dorsalis pedis pulses are 2+ on the right side and 2+ on the left side.        Posterior tibial pulses are 2+ on the right side and 2+ on the left side.      Comments: Capillary refill 3 seconds all toes/distal feet, all toes/both feet warm to touch.      Negative lymphadenopathy bilateral popliteal fossa and tarsal tunnel.      Negavie lower extremity edema bilateral.    Musculoskeletal:      Right ankle: She exhibits normal range of motion, no swelling, no ecchymosis, no deformity, no laceration and normal pulse. Achilles tendon normal. Achilles tendon exhibits no pain, no defect and normal Mejia's test results.       Comments: Sharp deep pain to palpation inferior arch at medial band plantar fascia, abductor hallucis, and FHB without ecchymosis, erythema, edema, or cardinal signs infection, and no signs of trauma.    Ankle dorsiflexion decreased at <10 degrees bilateral with moderate increase with knee flexion bilateral.    Otherwise, Normal angle, base, station of gait. All ten toes without clubbing, cyanosis, or signs of ischemia.  No pain to palpation bilateral lower extremities.  Range of motion, stability, muscle strength, and muscle tone normal bilateral feet and legs.    Lymphadenopathy:      Lower Body: No right inguinal adenopathy. No left inguinal adenopathy.      Comments: Negative lymphadenopathy bilateral popliteal fossa and tarsal tunnel.    Negative lymphangitic streaking bilateral feet/ankles/legs.   Skin:     General: Skin is warm and dry.      Capillary Refill: Capillary refill takes 2 to 3 seconds.      Coloration: Skin is not pale.      Findings: No abrasion, bruising, burn, ecchymosis, erythema, laceration, lesion or rash.      Nails: There is no clubbing.        Comments:   Skin is normal age and health appropriate color, turgor, texture, and temperature bilateral lower extremities without ulceration, hyperpigmentation, discoloration, masses nodules or cords palpated.  No ecchymosis, erythema, edema, or cardinal signs of infection bilateral lower extremities.     Neurological:      Mental Status: She is alert and oriented to person, place, and time.      Sensory: No sensory deficit.      Motor: No tremor, atrophy or abnormal muscle tone.      Gait: Gait normal.      Deep Tendon Reflexes:      Reflex Scores:       Patellar reflexes are 2+ on the right side and 2+ on the left side.       Achilles reflexes are 2+ on the right side and 2+ on the left side.     Comments: Dysesthesia plantar distal left great toe.     Negative allodynia.    Negative tinel sign to percussion sural, superficial peroneal, deep  peroneal, saphenous, and posterior tibial nerves right and left ankles and feet.     Psychiatric:         Behavior: Behavior is cooperative.               Assessment:       Encounter Diagnoses   Name Primary?    Plantar fasciitis Yes    Foot pain, left     Equinus contracture of ankle          Plan:       Evelia was seen today for toe pain.    Diagnoses and all orders for this visit:    Plantar fasciitis    Foot pain, left    Equinus contracture of ankle      I counseled the patient on her conditions, their implications and medical management.        Patient will stretch the tendo achilles complex three times daily as demonstrated in the office.  Literature was dispensed illustrating proper stretching technique.    I applied a plantar rest strapping to the patient's left foot to offload symptomatic area, support the arch, and relieve pain.    Patient will obtain over the counter arch supports and wear them in shoes whenever possible.  Athletic shoes intended for walking or running are usually best.    Discussed conservative treatment with shoes of adequate dimensions, material, and style to alleviate symptoms and delay or prevent surgical intervention.    Custom orthotics          Follow up in about 1 month (around 10/29/2020).

## 2020-11-16 ENCOUNTER — PATIENT MESSAGE (OUTPATIENT)
Dept: PODIATRY | Facility: CLINIC | Age: 40
End: 2020-11-16

## 2020-11-16 ENCOUNTER — PATIENT MESSAGE (OUTPATIENT)
Dept: INTERNAL MEDICINE | Facility: CLINIC | Age: 40
End: 2020-11-16

## 2020-11-18 ENCOUNTER — TELEPHONE (OUTPATIENT)
Dept: INTERNAL MEDICINE | Facility: CLINIC | Age: 40
End: 2020-11-18

## 2020-11-18 NOTE — TELEPHONE ENCOUNTER
Carrol,     Can you change the location of her lab work scheduled for the beginning of next month to the [facility] for the doctor?    Thank you!

## 2020-12-04 ENCOUNTER — CLINICAL SUPPORT (OUTPATIENT)
Dept: URGENT CARE | Facility: CLINIC | Age: 40
End: 2020-12-04
Payer: COMMERCIAL

## 2020-12-04 DIAGNOSIS — R05.9 COUGH: Primary | ICD-10-CM

## 2020-12-04 DIAGNOSIS — R05.9 COUGH: ICD-10-CM

## 2020-12-04 DIAGNOSIS — U07.1 COVID-19 VIRUS DETECTED: ICD-10-CM

## 2020-12-04 LAB
CTP QC/QA: YES
SARS-COV-2 RDRP RESP QL NAA+PROBE: POSITIVE

## 2020-12-04 PROCEDURE — U0002 COVID-19 LAB TEST NON-CDC: HCPCS | Mod: QW,S$GLB,, | Performed by: INTERNAL MEDICINE

## 2020-12-04 PROCEDURE — U0002: ICD-10-PCS | Mod: QW,S$GLB,, | Performed by: INTERNAL MEDICINE

## 2020-12-05 ENCOUNTER — TELEPHONE (OUTPATIENT)
Dept: PRIMARY CARE CLINIC | Facility: OTHER | Age: 40
End: 2020-12-05

## 2020-12-05 NOTE — TELEPHONE ENCOUNTER
Call #2: Spoke to patient regarding positive result, symptom onset 12/3/2020.  Work exposure.  KG-12/5/2020

## 2020-12-05 NOTE — PATIENT INSTRUCTIONS
 .CDC Testing and Quarantine Guidelines for patients with exposure to a known-positive COVID-19 person:  o A 'close exposure' is defined as anyone who has had an exposure (masked or unmasked) to a known COVID -19 positive person within 6 ft for longer than 15 minutes. If your exposure meets this definition you are required by CDC guidelines to quarantine for at least 7-10 days from time of exposure. The CDC states that a test can be performed for an asymptomatic patient (someone who does not have any symptoms) after a close exposure, and that a test should be done if you develop symptoms after a close exposure as described above.  Specifically, you can test at day 5 or later if asymptomatic, in order to get released from quarantine on day 7 or later.  If you develop symptoms sooner, you should test when your symptoms start.  o If you meet the definition of a close exposure, it will not matter whether you are experiencing symptoms- a quarantine for at least 7-10 days after a close exposure is required by CDC guidelines.  o Please note, if you decide to test as an asymptomatic during your quarantine and you are positive, you will be restarting your quarantine and moving from a possible 10 day quarantine (if you do not test), to a 11 day or greater quarantine.  o The CDC also suggests people still monitor for symptoms for a full 14 days and remember that the shorter quarantine options do not replace initial CDC guidance.  The CDC continues to recommend quarantining for 14 days as the best way to reduce risk for spreading COVID-19 - however, this is only a recommendation.  o If your exposure does not meet the above definition, you can return to your normal daily activities to include social distancing, wearing a mask and frequent handwashing.      NEGATIVE COVID TEST  o You have tested negative for COVID-19 today.  If you did not have a close exposure (as defined below) you can return to your normal daily activities  "to include social distancing, wearing a mask and frequent handwashing.  o A "close exposure" is defined as anyone who has had an exposure (masked or unmasked) to a known COVID -19 positive person within 6 ft for longer than 15 minutes. If your exposure meets this definition, you are required by CDC guidelines to quarantine for at least 7-10 days from time of exposure.  o The CDC states that a test can be performed for an asymptomatic patient (someone who does not have any symptoms) after a close exposure, and that a test should be done if you develop symptoms after a close exposure as described above.  o Specifically, you can test at day 5 or later if asymptomatic in order to get released from quarantine on day 7 or later.  If you develop symptoms sooner, you should test when your symptoms start.  o If you developed symptoms since the exposure, and your test was negative today and less than 5 days from your exposure, you still have to quarantine for 7-10 days from the date of the exposure.  o The 7-10 day quarantine begins from the day you were exposed, not the day of your test.  For example, if your exposure was on a Monday, and you waited until Friday of the same week to get tested and it was negative, your 7-10 day quarantine begins from that Monday, not the Friday you tested negative.  o Please note, if you decide to test as an asymptomatic during your quarantine and you are positive, you will be restarting your quarantine and moving from a possible 10 day quarantine (if you do not test), to a 11 day or greater quarantine.           POSITIVE COVID TEST  o You have tested positive for COVID-19 today.  Please note that patients who test positive for COVID-19 are required by the CDC to undergo isolation for 10 days after their symptoms first began.  o This isolation starts from the day you first developed symptoms, not the day of your positive test. For example, if your symptoms began on a Monday but tested " positive on the following Wednesday, your 10-day isolation begins from that Monday, not the Wednesday you tested positive.  o However, if you are asymptomatic (a person who does not have any symptoms) and COVID-19 positive, your 10-day isolation begins on the day you tested positive, regardless of exposure date.  o Also, per the CDC guidelines, once your 10 days have passed, and you have not had fever greater than 100.4F in the last 24 hours without taking any fever reducers such as Tylenol (Acetaminophen) or Motrin (Ibuprofen), you may return to your normal activities including social distancing, wearing masks, and frequent handwashing - YOU DO NOT NEED ANOTHER TEST IN ORDER TO END YOUR QUARANTINE.

## 2020-12-18 ENCOUNTER — LAB VISIT (OUTPATIENT)
Dept: LAB | Facility: HOSPITAL | Age: 40
End: 2020-12-18
Attending: INTERNAL MEDICINE
Payer: COMMERCIAL

## 2020-12-18 DIAGNOSIS — R79.89 ELEVATED TSH: ICD-10-CM

## 2020-12-18 LAB
T4 FREE SERPL-MCNC: 0.97 NG/DL (ref 0.71–1.51)
TSH SERPL DL<=0.005 MIU/L-ACNC: 1.88 UIU/ML (ref 0.4–4)

## 2020-12-18 PROCEDURE — 84443 ASSAY THYROID STIM HORMONE: CPT

## 2020-12-18 PROCEDURE — 84439 ASSAY OF FREE THYROXINE: CPT

## 2020-12-18 PROCEDURE — 36415 COLL VENOUS BLD VENIPUNCTURE: CPT | Mod: PO

## 2020-12-21 ENCOUNTER — PATIENT MESSAGE (OUTPATIENT)
Dept: INTERNAL MEDICINE | Facility: CLINIC | Age: 40
End: 2020-12-21

## 2020-12-29 ENCOUNTER — OFFICE VISIT (OUTPATIENT)
Dept: OPTOMETRY | Facility: CLINIC | Age: 40
End: 2020-12-29
Payer: COMMERCIAL

## 2020-12-29 DIAGNOSIS — H52.13 MYOPIA WITH ASTIGMATISM, BILATERAL: Primary | ICD-10-CM

## 2020-12-29 DIAGNOSIS — H10.13 ALLERGIC CONJUNCTIVITIS OF BOTH EYES: ICD-10-CM

## 2020-12-29 DIAGNOSIS — H52.203 MYOPIA WITH ASTIGMATISM, BILATERAL: Primary | ICD-10-CM

## 2020-12-29 PROCEDURE — 92014 PR EYE EXAM, EST PATIENT,COMPREHESV: ICD-10-PCS | Mod: S$GLB,,, | Performed by: OPTOMETRIST

## 2020-12-29 PROCEDURE — 99999 PR PBB SHADOW E&M-EST. PATIENT-LVL II: CPT | Mod: PBBFAC,,, | Performed by: OPTOMETRIST

## 2020-12-29 PROCEDURE — 1126F PR PAIN SEVERITY QUANTIFIED, NO PAIN PRESENT: ICD-10-PCS | Mod: S$GLB,,, | Performed by: OPTOMETRIST

## 2020-12-29 PROCEDURE — 92014 COMPRE OPH EXAM EST PT 1/>: CPT | Mod: S$GLB,,, | Performed by: OPTOMETRIST

## 2020-12-29 PROCEDURE — 92015 DETERMINE REFRACTIVE STATE: CPT | Mod: S$GLB,,, | Performed by: OPTOMETRIST

## 2020-12-29 PROCEDURE — 1126F AMNT PAIN NOTED NONE PRSNT: CPT | Mod: S$GLB,,, | Performed by: OPTOMETRIST

## 2020-12-29 PROCEDURE — 99999 PR PBB SHADOW E&M-EST. PATIENT-LVL II: ICD-10-PCS | Mod: PBBFAC,,, | Performed by: OPTOMETRIST

## 2020-12-29 PROCEDURE — 92015 PR REFRACTION: ICD-10-PCS | Mod: S$GLB,,, | Performed by: OPTOMETRIST

## 2020-12-29 NOTE — PROGRESS NOTES
HPI     YARA:[birth year]/2019  Glasses? Yes   Contacts? no  H/o eye surgery, injections or laser: no  H/o eye injury: no  Known eye conditions? no  Family h/o eye conditions? MGM-glaucoma , cataracts   Eye gtts?no    (-) Flashes (-) Floaters (-) Mucous   (-) Tearing (+) Itching allergy season  (-) Burning   (-) Headaches (-) Eye Pain/discomfort (-) Irritation   (-) Redness (-) Double vision (-) Blurry vision    Diabetic? (-)  A1c?  (Hemoglobin A1C       Date                     Value               Ref Range             Status                08/13/2020               5.4                 4.0 - 5.6 %           Final              Comment:    ADA Screening Guidelines:  5.7-6.4%  Consistent with   prediabetes  >or=6.5%  Consistent with diabetes  High levels of fetal   hemoglobin interfere with the HbA1C  assay. Heterozygous hemoglobin   variants (HbS, HgC, etc)do  not significantly interfere with this assay.     However, presence of multiple variants may affect accuracy.         07/17/2019               5.3                 4.0 - 5.6 %           Final              Comment:    ADA Screening Guidelines:  5.7-6.4%  Consistent with   prediabetes  >or=6.5%  Consistent with diabetes  High levels of fetal   hemoglobin interfere with the HbA1C  assay. Heterozygous hemoglobin   variants (HbS, HgC, etc)do  not significantly interfere with this assay.     However, presence of multiple variants may affect accuracy.         09/11/2018               5.0                 4.0 - 5.6 %           Final              Comment:    ADA Screening Guidelines:  5.7-6.4%  Consistent with   prediabetes  >or=6.5%  Consistent with diabetes  High levels of fetal   hemoglobin interfere with the HbA1C  assay. Heterozygous hemoglobin   variants (HbS, HgC, etc)do  not significantly interfere with this assay.     However, presence of multiple variants may affect accuracy.    ----------)        Last edited by Sherrell Bunn on 12/29/2020  2:38 PM. (History)             Assessment /Plan     For exam results, see Encounter Report.    Myopia with astigmatism, bilateral    Allergic conjunctivitis of both eyes      1. SRx released to patient. Patient educated on lens options. Normal ocular health. RTC 1 year for routine exam.   2. Recommend Zaditor or Alaway bid OU and cool compresses to help soothe itching. Patient advised to RTC if condition gets worse.

## 2021-02-02 ENCOUNTER — OFFICE VISIT (OUTPATIENT)
Dept: ALLERGY | Facility: CLINIC | Age: 41
End: 2021-02-02
Payer: COMMERCIAL

## 2021-02-02 VITALS — WEIGHT: 139.13 LBS | HEIGHT: 62 IN | BODY MASS INDEX: 25.6 KG/M2

## 2021-02-02 DIAGNOSIS — J30.89 ALLERGIC RHINITIS DUE TO DUST MITE: Primary | ICD-10-CM

## 2021-02-02 PROCEDURE — 1126F PR PAIN SEVERITY QUANTIFIED, NO PAIN PRESENT: ICD-10-PCS | Mod: S$GLB,,, | Performed by: ALLERGY & IMMUNOLOGY

## 2021-02-02 PROCEDURE — 99204 OFFICE O/P NEW MOD 45 MIN: CPT | Mod: S$GLB,,, | Performed by: ALLERGY & IMMUNOLOGY

## 2021-02-02 PROCEDURE — 99999 PR PBB SHADOW E&M-EST. PATIENT-LVL II: ICD-10-PCS | Mod: PBBFAC,,, | Performed by: ALLERGY & IMMUNOLOGY

## 2021-02-02 PROCEDURE — 99204 PR OFFICE/OUTPT VISIT, NEW, LEVL IV, 45-59 MIN: ICD-10-PCS | Mod: S$GLB,,, | Performed by: ALLERGY & IMMUNOLOGY

## 2021-02-02 PROCEDURE — 1126F AMNT PAIN NOTED NONE PRSNT: CPT | Mod: S$GLB,,, | Performed by: ALLERGY & IMMUNOLOGY

## 2021-02-02 PROCEDURE — 3008F BODY MASS INDEX DOCD: CPT | Mod: CPTII,S$GLB,, | Performed by: ALLERGY & IMMUNOLOGY

## 2021-02-02 PROCEDURE — 99999 PR PBB SHADOW E&M-EST. PATIENT-LVL II: CPT | Mod: PBBFAC,,, | Performed by: ALLERGY & IMMUNOLOGY

## 2021-02-02 PROCEDURE — 3008F PR BODY MASS INDEX (BMI) DOCUMENTED: ICD-10-PCS | Mod: CPTII,S$GLB,, | Performed by: ALLERGY & IMMUNOLOGY

## 2021-02-02 RX ORDER — AZELASTINE 1 MG/ML
1 SPRAY, METERED NASAL 2 TIMES DAILY
Qty: 30 ML | Refills: 11 | Status: SHIPPED | OUTPATIENT
Start: 2021-02-02 | End: 2022-02-02

## 2021-03-18 ENCOUNTER — IMMUNIZATION (OUTPATIENT)
Dept: INTERNAL MEDICINE | Facility: CLINIC | Age: 41
End: 2021-03-18
Payer: COMMERCIAL

## 2021-03-18 DIAGNOSIS — Z23 NEED FOR VACCINATION: Primary | ICD-10-CM

## 2021-03-18 PROCEDURE — 0001A COVID-19, MRNA, LNP-S, PF, 30 MCG/0.3 ML DOSE VACCINE: ICD-10-PCS | Mod: CV19,S$GLB,, | Performed by: INTERNAL MEDICINE

## 2021-03-18 PROCEDURE — 91300 COVID-19, MRNA, LNP-S, PF, 30 MCG/0.3 ML DOSE VACCINE: CPT | Mod: S$GLB,,, | Performed by: INTERNAL MEDICINE

## 2021-03-18 PROCEDURE — 91300 COVID-19, MRNA, LNP-S, PF, 30 MCG/0.3 ML DOSE VACCINE: ICD-10-PCS | Mod: S$GLB,,, | Performed by: INTERNAL MEDICINE

## 2021-03-18 PROCEDURE — 0001A COVID-19, MRNA, LNP-S, PF, 30 MCG/0.3 ML DOSE VACCINE: CPT | Mod: CV19,S$GLB,, | Performed by: INTERNAL MEDICINE

## 2021-04-08 ENCOUNTER — IMMUNIZATION (OUTPATIENT)
Dept: INTERNAL MEDICINE | Facility: CLINIC | Age: 41
End: 2021-04-08
Payer: COMMERCIAL

## 2021-04-08 DIAGNOSIS — Z23 NEED FOR VACCINATION: Primary | ICD-10-CM

## 2021-04-08 PROCEDURE — 0002A COVID-19, MRNA, LNP-S, PF, 30 MCG/0.3 ML DOSE VACCINE: CPT | Mod: CV19,S$GLB,, | Performed by: INTERNAL MEDICINE

## 2021-04-08 PROCEDURE — 91300 COVID-19, MRNA, LNP-S, PF, 30 MCG/0.3 ML DOSE VACCINE: CPT | Mod: S$GLB,,, | Performed by: INTERNAL MEDICINE

## 2021-04-08 PROCEDURE — 91300 COVID-19, MRNA, LNP-S, PF, 30 MCG/0.3 ML DOSE VACCINE: ICD-10-PCS | Mod: S$GLB,,, | Performed by: INTERNAL MEDICINE

## 2021-04-08 PROCEDURE — 0002A COVID-19, MRNA, LNP-S, PF, 30 MCG/0.3 ML DOSE VACCINE: ICD-10-PCS | Mod: CV19,S$GLB,, | Performed by: INTERNAL MEDICINE

## 2021-06-27 ENCOUNTER — HOSPITAL ENCOUNTER (EMERGENCY)
Facility: HOSPITAL | Age: 41
Discharge: HOME | End: 2021-06-28
Attending: EMERGENCY MEDICINE
Payer: COMMERCIAL

## 2021-06-27 DIAGNOSIS — R19.7 DIARRHEA, UNSPECIFIED TYPE: Primary | ICD-10-CM

## 2021-06-27 LAB
ALBUMIN SERPL BCP-MCNC: 4.4 G/DL (ref 3.5–5.2)
ALP SERPL-CCNC: 63 U/L (ref 55–135)
ALT SERPL W/O P-5'-P-CCNC: 22 U/L (ref 10–44)
ANION GAP SERPL CALC-SCNC: 7 MMOL/L (ref 8–16)
AST SERPL-CCNC: 18 U/L (ref 10–40)
B-HCG UR QL: NEGATIVE
BASOPHILS # BLD AUTO: 0.02 K/UL (ref 0–0.2)
BASOPHILS NFR BLD: 0.2 % (ref 0–1.9)
BILIRUB SERPL-MCNC: 0.8 MG/DL (ref 0.1–1)
BUN SERPL-MCNC: 12 MG/DL (ref 6–20)
CALCIUM SERPL-MCNC: 9 MG/DL (ref 8.7–10.5)
CHLORIDE SERPL-SCNC: 106 MMOL/L (ref 95–110)
CO2 SERPL-SCNC: 24 MMOL/L (ref 23–29)
CREAT SERPL-MCNC: 0.7 MG/DL (ref 0.5–1.4)
CTP QC/QA: YES
CTP QC/QA: YES
DIFFERENTIAL METHOD: ABNORMAL
EOSINOPHIL # BLD AUTO: 0.3 K/UL (ref 0–0.5)
EOSINOPHIL NFR BLD: 3 % (ref 0–8)
ERYTHROCYTE [DISTWIDTH] IN BLOOD BY AUTOMATED COUNT: 12.2 % (ref 11.5–14.5)
EST. GFR  (AFRICAN AMERICAN): >60 ML/MIN/1.73 M^2
EST. GFR  (NON AFRICAN AMERICAN): >60 ML/MIN/1.73 M^2
GLUCOSE SERPL-MCNC: 91 MG/DL (ref 70–110)
HCT VFR BLD AUTO: 40.6 % (ref 37–48.5)
HGB BLD-MCNC: 13.7 G/DL (ref 12–16)
IMM GRANULOCYTES # BLD AUTO: 0.02 K/UL (ref 0–0.04)
IMM GRANULOCYTES NFR BLD AUTO: 0.2 % (ref 0–0.5)
LYMPHOCYTES # BLD AUTO: 2 K/UL (ref 1–4.8)
LYMPHOCYTES NFR BLD: 21.9 % (ref 18–48)
MCH RBC QN AUTO: 31.2 PG (ref 27–31)
MCHC RBC AUTO-ENTMCNC: 33.7 G/DL (ref 32–36)
MCV RBC AUTO: 93 FL (ref 82–98)
MONOCYTES # BLD AUTO: 0.6 K/UL (ref 0.3–1)
MONOCYTES NFR BLD: 6.9 % (ref 4–15)
NEUTROPHILS # BLD AUTO: 6.1 K/UL (ref 1.8–7.7)
NEUTROPHILS NFR BLD: 67.8 % (ref 38–73)
NRBC BLD-RTO: 0 /100 WBC
PLATELET # BLD AUTO: 242 K/UL (ref 150–450)
PMV BLD AUTO: 9.6 FL (ref 9.2–12.9)
POTASSIUM SERPL-SCNC: 3.8 MMOL/L (ref 3.5–5.1)
PROT SERPL-MCNC: 8 G/DL (ref 6–8.4)
RBC # BLD AUTO: 4.39 M/UL (ref 4–5.4)
SARS-COV-2 RDRP RESP QL NAA+PROBE: NEGATIVE
SODIUM SERPL-SCNC: 137 MMOL/L (ref 136–145)
WBC # BLD AUTO: 9 K/UL (ref 3.9–12.7)

## 2021-06-27 PROCEDURE — 85025 COMPLETE CBC W/AUTO DIFF WBC: CPT | Performed by: EMERGENCY MEDICINE

## 2021-06-27 PROCEDURE — 63600175 PHARM REV CODE 636 W HCPCS: Performed by: EMERGENCY MEDICINE

## 2021-06-27 PROCEDURE — U0002 COVID-19 LAB TEST NON-CDC: HCPCS | Performed by: EMERGENCY MEDICINE

## 2021-06-27 PROCEDURE — 99284 EMERGENCY DEPT VISIT MOD MDM: CPT | Mod: 25

## 2021-06-27 PROCEDURE — 96360 HYDRATION IV INFUSION INIT: CPT

## 2021-06-27 PROCEDURE — 80053 COMPREHEN METABOLIC PANEL: CPT | Performed by: EMERGENCY MEDICINE

## 2021-06-27 PROCEDURE — 96361 HYDRATE IV INFUSION ADD-ON: CPT

## 2021-06-27 PROCEDURE — 25000003 PHARM REV CODE 250: Performed by: EMERGENCY MEDICINE

## 2021-06-27 PROCEDURE — 81025 URINE PREGNANCY TEST: CPT | Performed by: EMERGENCY MEDICINE

## 2021-06-27 RX ORDER — HYOSCYAMINE SULFATE 0.12 MG/1
0.12 TABLET SUBLINGUAL
Status: COMPLETED | OUTPATIENT
Start: 2021-06-27 | End: 2021-06-27

## 2021-06-27 RX ADMIN — SODIUM CHLORIDE, SODIUM LACTATE, POTASSIUM CHLORIDE, AND CALCIUM CHLORIDE 1000 ML: .6; .31; .03; .02 INJECTION, SOLUTION INTRAVENOUS at 11:06

## 2021-06-27 RX ADMIN — HYOSCYAMINE SULFATE 0.12 MG: 0.12 TABLET ORAL; SUBLINGUAL at 11:06

## 2021-06-28 VITALS
BODY MASS INDEX: 24.29 KG/M2 | OXYGEN SATURATION: 99 % | DIASTOLIC BLOOD PRESSURE: 72 MMHG | WEIGHT: 132 LBS | HEIGHT: 62 IN | SYSTOLIC BLOOD PRESSURE: 126 MMHG | TEMPERATURE: 98 F | RESPIRATION RATE: 18 BRPM | HEART RATE: 78 BPM

## 2021-06-28 RX ORDER — HYOSCYAMINE SULFATE 0.125 MG
125 TABLET ORAL EVERY 6 HOURS PRN
Qty: 30 TABLET | Refills: 0 | Status: SHIPPED | OUTPATIENT
Start: 2021-06-28 | End: 2021-07-16

## 2021-07-16 ENCOUNTER — OFFICE VISIT (OUTPATIENT)
Dept: GASTROENTEROLOGY | Facility: CLINIC | Age: 41
End: 2021-07-16
Payer: COMMERCIAL

## 2021-07-16 ENCOUNTER — TELEPHONE (OUTPATIENT)
Dept: ENDOSCOPY | Facility: HOSPITAL | Age: 41
End: 2021-07-16

## 2021-07-16 VITALS — BODY MASS INDEX: 25.28 KG/M2 | HEIGHT: 62 IN | WEIGHT: 137.38 LBS

## 2021-07-16 DIAGNOSIS — R10.9 ABDOMINAL PAIN, UNSPECIFIED ABDOMINAL LOCATION: Primary | ICD-10-CM

## 2021-07-16 DIAGNOSIS — R19.7 DIARRHEA, UNSPECIFIED TYPE: ICD-10-CM

## 2021-07-16 PROCEDURE — 99999 PR PBB SHADOW E&M-EST. PATIENT-LVL III: ICD-10-PCS | Mod: PBBFAC,,, | Performed by: INTERNAL MEDICINE

## 2021-07-16 PROCEDURE — 3008F BODY MASS INDEX DOCD: CPT | Mod: CPTII,S$GLB,, | Performed by: INTERNAL MEDICINE

## 2021-07-16 PROCEDURE — 99999 PR PBB SHADOW E&M-EST. PATIENT-LVL III: CPT | Mod: PBBFAC,,, | Performed by: INTERNAL MEDICINE

## 2021-07-16 PROCEDURE — 3008F PR BODY MASS INDEX (BMI) DOCUMENTED: ICD-10-PCS | Mod: CPTII,S$GLB,, | Performed by: INTERNAL MEDICINE

## 2021-07-16 PROCEDURE — 99204 PR OFFICE/OUTPT VISIT, NEW, LEVL IV, 45-59 MIN: ICD-10-PCS | Mod: S$GLB,,, | Performed by: INTERNAL MEDICINE

## 2021-07-16 PROCEDURE — 99204 OFFICE O/P NEW MOD 45 MIN: CPT | Mod: S$GLB,,, | Performed by: INTERNAL MEDICINE

## 2021-07-16 RX ORDER — DICYCLOMINE HYDROCHLORIDE 10 MG/1
10 CAPSULE ORAL 4 TIMES DAILY PRN
Qty: 120 CAPSULE | Refills: 3 | Status: SHIPPED | OUTPATIENT
Start: 2021-07-16 | End: 2021-08-15

## 2021-07-19 ENCOUNTER — HOSPITAL ENCOUNTER (OUTPATIENT)
Facility: HOSPITAL | Age: 41
Discharge: HOME | End: 2021-07-19
Attending: INTERNAL MEDICINE | Admitting: INTERNAL MEDICINE
Payer: COMMERCIAL

## 2021-07-19 ENCOUNTER — ANESTHESIA EVENT (OUTPATIENT)
Dept: ENDOSCOPY | Facility: HOSPITAL | Age: 41
End: 2021-07-19
Payer: COMMERCIAL

## 2021-07-19 ENCOUNTER — ANESTHESIA (OUTPATIENT)
Dept: ENDOSCOPY | Facility: HOSPITAL | Age: 41
End: 2021-07-19
Payer: COMMERCIAL

## 2021-07-19 VITALS
HEART RATE: 69 BPM | RESPIRATION RATE: 16 BRPM | HEIGHT: 62 IN | BODY MASS INDEX: 25.03 KG/M2 | TEMPERATURE: 98 F | DIASTOLIC BLOOD PRESSURE: 73 MMHG | WEIGHT: 136 LBS | OXYGEN SATURATION: 100 % | SYSTOLIC BLOOD PRESSURE: 109 MMHG

## 2021-07-19 DIAGNOSIS — R10.9 ABDOMINAL PAIN, UNSPECIFIED ABDOMINAL LOCATION: Primary | ICD-10-CM

## 2021-07-19 LAB
B-HCG UR QL: NEGATIVE
CTP QC/QA: YES

## 2021-07-19 PROCEDURE — 88305 TISSUE EXAM BY PATHOLOGIST: CPT | Mod: 26,,, | Performed by: STUDENT IN AN ORGANIZED HEALTH CARE EDUCATION/TRAINING PROGRAM

## 2021-07-19 PROCEDURE — 25000003 PHARM REV CODE 250: Performed by: NURSE ANESTHETIST, CERTIFIED REGISTERED

## 2021-07-19 PROCEDURE — 27201012 HC FORCEPS, HOT/COLD, DISP: Performed by: INTERNAL MEDICINE

## 2021-07-19 PROCEDURE — 88342 CHG IMMUNOCYTOCHEMISTRY: ICD-10-PCS | Mod: 26,,, | Performed by: STUDENT IN AN ORGANIZED HEALTH CARE EDUCATION/TRAINING PROGRAM

## 2021-07-19 PROCEDURE — 37000009 HC ANESTHESIA EA ADD 15 MINS: Performed by: INTERNAL MEDICINE

## 2021-07-19 PROCEDURE — 63600175 PHARM REV CODE 636 W HCPCS: Performed by: NURSE ANESTHETIST, CERTIFIED REGISTERED

## 2021-07-19 PROCEDURE — 43239 EGD BIOPSY SINGLE/MULTIPLE: CPT | Performed by: INTERNAL MEDICINE

## 2021-07-19 PROCEDURE — 88305 TISSUE EXAM BY PATHOLOGIST: ICD-10-PCS | Mod: 26,,, | Performed by: STUDENT IN AN ORGANIZED HEALTH CARE EDUCATION/TRAINING PROGRAM

## 2021-07-19 PROCEDURE — 25000003 PHARM REV CODE 250: Performed by: INTERNAL MEDICINE

## 2021-07-19 PROCEDURE — 88342 IMHCHEM/IMCYTCHM 1ST ANTB: CPT | Mod: 26,,, | Performed by: STUDENT IN AN ORGANIZED HEALTH CARE EDUCATION/TRAINING PROGRAM

## 2021-07-19 PROCEDURE — 37000008 HC ANESTHESIA 1ST 15 MINUTES: Performed by: INTERNAL MEDICINE

## 2021-07-19 PROCEDURE — 43239 PR EGD, FLEX, W/BIOPSY, SGL/MULTI: ICD-10-PCS | Mod: ,,, | Performed by: INTERNAL MEDICINE

## 2021-07-19 PROCEDURE — 43239 EGD BIOPSY SINGLE/MULTIPLE: CPT | Mod: ,,, | Performed by: INTERNAL MEDICINE

## 2021-07-19 PROCEDURE — 88342 IMHCHEM/IMCYTCHM 1ST ANTB: CPT | Performed by: STUDENT IN AN ORGANIZED HEALTH CARE EDUCATION/TRAINING PROGRAM

## 2021-07-19 PROCEDURE — 88305 TISSUE EXAM BY PATHOLOGIST: CPT | Performed by: STUDENT IN AN ORGANIZED HEALTH CARE EDUCATION/TRAINING PROGRAM

## 2021-07-19 RX ORDER — PROPOFOL 10 MG/ML
VIAL (ML) INTRAVENOUS
Status: DISCONTINUED | OUTPATIENT
Start: 2021-07-19 | End: 2021-07-19

## 2021-07-19 RX ORDER — SODIUM CHLORIDE 9 MG/ML
INJECTION, SOLUTION INTRAVENOUS CONTINUOUS
Status: DISCONTINUED | OUTPATIENT
Start: 2021-07-19 | End: 2021-07-19 | Stop reason: HOSPADM

## 2021-07-19 RX ORDER — LIDOCAINE HCL/PF 100 MG/5ML
SYRINGE (ML) INTRAVENOUS
Status: DISCONTINUED | OUTPATIENT
Start: 2021-07-19 | End: 2021-07-19

## 2021-07-19 RX ORDER — PROPOFOL 10 MG/ML
VIAL (ML) INTRAVENOUS CONTINUOUS PRN
Status: DISCONTINUED | OUTPATIENT
Start: 2021-07-19 | End: 2021-07-19

## 2021-07-19 RX ADMIN — PROPOFOL 150 MCG/KG/MIN: 10 INJECTION, EMULSION INTRAVENOUS at 03:07

## 2021-07-19 RX ADMIN — SODIUM CHLORIDE 20 ML/HR: 0.9 INJECTION, SOLUTION INTRAVENOUS at 03:07

## 2021-07-19 RX ADMIN — LIDOCAINE HYDROCHLORIDE 50 MG: 20 INJECTION, SOLUTION INTRAVENOUS at 03:07

## 2021-07-19 RX ADMIN — PROPOFOL 50 MG: 10 INJECTION, EMULSION INTRAVENOUS at 03:07

## 2021-07-20 ENCOUNTER — TELEPHONE (OUTPATIENT)
Dept: ENDOSCOPY | Facility: HOSPITAL | Age: 41
End: 2021-07-20

## 2021-07-27 ENCOUNTER — PATIENT MESSAGE (OUTPATIENT)
Dept: GASTROENTEROLOGY | Facility: CLINIC | Age: 41
End: 2021-07-27

## 2021-07-27 LAB
FINAL PATHOLOGIC DIAGNOSIS: NORMAL
GROSS: NORMAL
Lab: NORMAL

## 2021-07-28 RX ORDER — CLARITHROMYCIN 500 MG/1
500 TABLET, FILM COATED ORAL EVERY 12 HOURS
Qty: 28 TABLET | Refills: 0 | Status: SHIPPED | OUTPATIENT
Start: 2021-07-28 | End: 2021-08-11

## 2021-07-28 RX ORDER — AMOXICILLIN 500 MG/1
1000 TABLET, FILM COATED ORAL EVERY 12 HOURS
Qty: 56 TABLET | Refills: 0 | Status: SHIPPED | OUTPATIENT
Start: 2021-07-28 | End: 2021-08-11

## 2021-07-28 RX ORDER — OMEPRAZOLE 40 MG/1
40 CAPSULE, DELAYED RELEASE ORAL
Qty: 28 CAPSULE | Refills: 0 | Status: SHIPPED | OUTPATIENT
Start: 2021-07-28 | End: 2021-10-08

## 2021-07-29 ENCOUNTER — PATIENT MESSAGE (OUTPATIENT)
Dept: ALLERGY | Facility: CLINIC | Age: 41
End: 2021-07-29

## 2021-07-29 RX ORDER — LORATADINE 10 MG/1
10 TABLET ORAL DAILY
Qty: 30 TABLET | Refills: 2 | Status: SHIPPED | OUTPATIENT
Start: 2021-07-29 | End: 2021-10-08

## 2021-07-29 RX ORDER — AMOXICILLIN 500 MG/1
1000 CAPSULE ORAL 2 TIMES DAILY
COMMUNITY
Start: 2021-07-28 | End: 2021-10-08

## 2021-08-11 ENCOUNTER — PATIENT MESSAGE (OUTPATIENT)
Dept: GASTROENTEROLOGY | Facility: CLINIC | Age: 41
End: 2021-08-11

## 2021-08-15 NOTE — TELEPHONE ENCOUNTER
----- Message from Safia Meeks sent at 1/23/2020  8:50 AM CST -----  Contact: self   Pt is calling to see if Dr Garcia would be able to take her sister-in-law on as a new patient. Please call and advise  
I spoke with patient and let her know the next available appointment will be in May, she let me know she will Establish care with another Provider   
Attending with

## 2021-08-26 ENCOUNTER — PATIENT MESSAGE (OUTPATIENT)
Dept: GASTROENTEROLOGY | Facility: CLINIC | Age: 41
End: 2021-08-26

## 2021-08-26 DIAGNOSIS — A04.8 H. PYLORI INFECTION: Primary | ICD-10-CM

## 2021-09-20 ENCOUNTER — PATIENT MESSAGE (OUTPATIENT)
Dept: GASTROENTEROLOGY | Facility: CLINIC | Age: 41
End: 2021-09-20

## 2021-09-21 DIAGNOSIS — Z00.00 ROUTINE GENERAL MEDICAL EXAMINATION AT A HEALTH CARE FACILITY: Primary | ICD-10-CM

## 2021-10-08 ENCOUNTER — HOSPITAL ENCOUNTER (OUTPATIENT)
Dept: CARDIOLOGY | Facility: CLINIC | Age: 41
Discharge: HOME | End: 2021-10-08
Payer: COMMERCIAL

## 2021-10-08 ENCOUNTER — LAB VISIT (OUTPATIENT)
Dept: LAB | Facility: HOSPITAL | Age: 41
End: 2021-10-08
Attending: INTERNAL MEDICINE
Payer: COMMERCIAL

## 2021-10-08 ENCOUNTER — HOSPITAL ENCOUNTER (OUTPATIENT)
Dept: RADIOLOGY | Facility: HOSPITAL | Age: 41
Discharge: HOME | End: 2021-10-08
Attending: INTERNAL MEDICINE
Payer: COMMERCIAL

## 2021-10-08 ENCOUNTER — OFFICE VISIT (OUTPATIENT)
Dept: INTERNAL MEDICINE | Facility: CLINIC | Age: 41
End: 2021-10-08
Payer: COMMERCIAL

## 2021-10-08 ENCOUNTER — PATIENT MESSAGE (OUTPATIENT)
Dept: GASTROENTEROLOGY | Facility: CLINIC | Age: 41
End: 2021-10-08

## 2021-10-08 ENCOUNTER — CLINICAL SUPPORT (OUTPATIENT)
Dept: INTERNAL MEDICINE | Facility: CLINIC | Age: 41
End: 2021-10-08
Payer: COMMERCIAL

## 2021-10-08 VITALS
WEIGHT: 137 LBS | DIASTOLIC BLOOD PRESSURE: 69 MMHG | HEIGHT: 62 IN | HEART RATE: 68 BPM | BODY MASS INDEX: 25.21 KG/M2 | SYSTOLIC BLOOD PRESSURE: 117 MMHG

## 2021-10-08 VITALS — BODY MASS INDEX: 25.76 KG/M2 | WEIGHT: 140 LBS | HEIGHT: 62 IN

## 2021-10-08 DIAGNOSIS — Z00.00 ROUTINE GENERAL MEDICAL EXAMINATION AT A HEALTH CARE FACILITY: Primary | ICD-10-CM

## 2021-10-08 DIAGNOSIS — R79.89 ELEVATED TSH: ICD-10-CM

## 2021-10-08 DIAGNOSIS — Z00.00 ROUTINE GENERAL MEDICAL EXAMINATION AT A HEALTH CARE FACILITY: ICD-10-CM

## 2021-10-08 DIAGNOSIS — A04.8 H. PYLORI INFECTION: ICD-10-CM

## 2021-10-08 DIAGNOSIS — Z86.19 HISTORY OF HELICOBACTER PYLORI INFECTION: ICD-10-CM

## 2021-10-08 DIAGNOSIS — Z00.00 ANNUAL PHYSICAL EXAM: Primary | ICD-10-CM

## 2021-10-08 LAB
ALBUMIN SERPL BCP-MCNC: 4.1 G/DL (ref 3.5–5.2)
ALP SERPL-CCNC: 57 U/L (ref 55–135)
ALT SERPL W/O P-5'-P-CCNC: 15 U/L (ref 10–44)
ANION GAP SERPL CALC-SCNC: 10 MMOL/L (ref 8–16)
AST SERPL-CCNC: 15 U/L (ref 10–40)
BILIRUB SERPL-MCNC: 0.8 MG/DL (ref 0.1–1)
BUN SERPL-MCNC: 14 MG/DL (ref 6–20)
CALCIUM SERPL-MCNC: 9.5 MG/DL (ref 8.7–10.5)
CHLORIDE SERPL-SCNC: 105 MMOL/L (ref 95–110)
CHOLEST SERPL-MCNC: 205 MG/DL (ref 120–199)
CHOLEST/HDLC SERPL: 3.2 {RATIO} (ref 2–5)
CO2 SERPL-SCNC: 22 MMOL/L (ref 23–29)
CREAT SERPL-MCNC: 0.7 MG/DL (ref 0.5–1.4)
ERYTHROCYTE [DISTWIDTH] IN BLOOD BY AUTOMATED COUNT: 12.3 % (ref 11.5–14.5)
EST. GFR  (AFRICAN AMERICAN): >60 ML/MIN/1.73 M^2
EST. GFR  (NON AFRICAN AMERICAN): >60 ML/MIN/1.73 M^2
ESTIMATED AVG GLUCOSE: 111 MG/DL (ref 68–131)
GLUCOSE SERPL-MCNC: 90 MG/DL (ref 70–110)
HBA1C MFR BLD: 5.5 % (ref 4–5.6)
HCT VFR BLD AUTO: 38.7 % (ref 37–48.5)
HDLC SERPL-MCNC: 65 MG/DL (ref 40–75)
HDLC SERPL: 31.7 % (ref 20–50)
HGB BLD-MCNC: 13.2 G/DL (ref 12–16)
LDLC SERPL CALC-MCNC: 127.6 MG/DL (ref 63–159)
MCH RBC QN AUTO: 31.5 PG (ref 27–31)
MCHC RBC AUTO-ENTMCNC: 34.1 G/DL (ref 32–36)
MCV RBC AUTO: 92 FL (ref 82–98)
NONHDLC SERPL-MCNC: 140 MG/DL
PLATELET # BLD AUTO: 237 K/UL (ref 150–450)
PMV BLD AUTO: 9.9 FL (ref 9.2–12.9)
POTASSIUM SERPL-SCNC: 4.2 MMOL/L (ref 3.5–5.1)
PROT SERPL-MCNC: 7.8 G/DL (ref 6–8.4)
RBC # BLD AUTO: 4.19 M/UL (ref 4–5.4)
SODIUM SERPL-SCNC: 137 MMOL/L (ref 136–145)
TRIGL SERPL-MCNC: 62 MG/DL (ref 30–150)
TSH SERPL DL<=0.005 MIU/L-ACNC: 1.44 UIU/ML (ref 0.4–4)
WBC # BLD AUTO: 4.84 K/UL (ref 3.9–12.7)

## 2021-10-08 PROCEDURE — 3078F DIAST BP <80 MM HG: CPT | Mod: CPTII,S$GLB,, | Performed by: INTERNAL MEDICINE

## 2021-10-08 PROCEDURE — 71046 X-RAY EXAM CHEST 2 VIEWS: CPT | Mod: TC,FY

## 2021-10-08 PROCEDURE — 36415 COLL VENOUS BLD VENIPUNCTURE: CPT | Performed by: INTERNAL MEDICINE

## 2021-10-08 PROCEDURE — 93010 EKG 12-LEAD: ICD-10-PCS | Mod: S$GLB,,, | Performed by: INTERNAL MEDICINE

## 2021-10-08 PROCEDURE — 80053 COMPREHEN METABOLIC PANEL: CPT | Performed by: INTERNAL MEDICINE

## 2021-10-08 PROCEDURE — 99999 PR PBB SHADOW E&M-EST. PATIENT-LVL III: ICD-10-PCS | Mod: PBBFAC,,, | Performed by: INTERNAL MEDICINE

## 2021-10-08 PROCEDURE — 93005 EKG 12-LEAD: ICD-10-PCS | Mod: S$GLB,,, | Performed by: INTERNAL MEDICINE

## 2021-10-08 PROCEDURE — 77063 BREAST TOMOSYNTHESIS BI: CPT | Mod: 26,,, | Performed by: RADIOLOGY

## 2021-10-08 PROCEDURE — 3074F SYST BP LT 130 MM HG: CPT | Mod: CPTII,S$GLB,, | Performed by: INTERNAL MEDICINE

## 2021-10-08 PROCEDURE — 3078F PR MOST RECENT DIASTOLIC BLOOD PRESSURE < 80 MM HG: ICD-10-PCS | Mod: CPTII,S$GLB,, | Performed by: INTERNAL MEDICINE

## 2021-10-08 PROCEDURE — 3044F PR MOST RECENT HEMOGLOBIN A1C LEVEL <7.0%: ICD-10-PCS | Mod: CPTII,S$GLB,, | Performed by: INTERNAL MEDICINE

## 2021-10-08 PROCEDURE — 87338 HPYLORI STOOL AG IA: CPT | Performed by: INTERNAL MEDICINE

## 2021-10-08 PROCEDURE — 99396 PREV VISIT EST AGE 40-64: CPT | Mod: S$GLB,,, | Performed by: INTERNAL MEDICINE

## 2021-10-08 PROCEDURE — 93010 ELECTROCARDIOGRAM REPORT: CPT | Mod: S$GLB,,, | Performed by: INTERNAL MEDICINE

## 2021-10-08 PROCEDURE — 77067 MAMMO DIGITAL SCREENING BILAT WITH TOMO: ICD-10-PCS | Mod: 26,,, | Performed by: RADIOLOGY

## 2021-10-08 PROCEDURE — 1159F PR MEDICATION LIST DOCUMENTED IN MEDICAL RECORD: ICD-10-PCS | Mod: CPTII,S$GLB,, | Performed by: INTERNAL MEDICINE

## 2021-10-08 PROCEDURE — 84443 ASSAY THYROID STIM HORMONE: CPT | Performed by: INTERNAL MEDICINE

## 2021-10-08 PROCEDURE — 77067 SCR MAMMO BI INCL CAD: CPT | Mod: TC

## 2021-10-08 PROCEDURE — 85027 COMPLETE CBC AUTOMATED: CPT | Performed by: INTERNAL MEDICINE

## 2021-10-08 PROCEDURE — 71046 XR CHEST PA AND LATERAL: ICD-10-PCS | Mod: 26,,, | Performed by: RADIOLOGY

## 2021-10-08 PROCEDURE — 99396 PR PREVENTIVE VISIT,EST,40-64: ICD-10-PCS | Mod: S$GLB,,, | Performed by: INTERNAL MEDICINE

## 2021-10-08 PROCEDURE — 83036 HEMOGLOBIN GLYCOSYLATED A1C: CPT | Performed by: INTERNAL MEDICINE

## 2021-10-08 PROCEDURE — 1159F MED LIST DOCD IN RCRD: CPT | Mod: CPTII,S$GLB,, | Performed by: INTERNAL MEDICINE

## 2021-10-08 PROCEDURE — 3074F PR MOST RECENT SYSTOLIC BLOOD PRESSURE < 130 MM HG: ICD-10-PCS | Mod: CPTII,S$GLB,, | Performed by: INTERNAL MEDICINE

## 2021-10-08 PROCEDURE — 93005 ELECTROCARDIOGRAM TRACING: CPT | Mod: S$GLB,,, | Performed by: INTERNAL MEDICINE

## 2021-10-08 PROCEDURE — 3008F PR BODY MASS INDEX (BMI) DOCUMENTED: ICD-10-PCS | Mod: CPTII,S$GLB,, | Performed by: INTERNAL MEDICINE

## 2021-10-08 PROCEDURE — 3008F BODY MASS INDEX DOCD: CPT | Mod: CPTII,S$GLB,, | Performed by: INTERNAL MEDICINE

## 2021-10-08 PROCEDURE — 71046 X-RAY EXAM CHEST 2 VIEWS: CPT | Mod: 26,,, | Performed by: RADIOLOGY

## 2021-10-08 PROCEDURE — 3044F HG A1C LEVEL LT 7.0%: CPT | Mod: CPTII,S$GLB,, | Performed by: INTERNAL MEDICINE

## 2021-10-08 PROCEDURE — 77067 SCR MAMMO BI INCL CAD: CPT | Mod: 26,,, | Performed by: RADIOLOGY

## 2021-10-08 PROCEDURE — 80061 LIPID PANEL: CPT | Performed by: INTERNAL MEDICINE

## 2021-10-08 PROCEDURE — 99999 PR PBB SHADOW E&M-EST. PATIENT-LVL III: CPT | Mod: PBBFAC,,, | Performed by: INTERNAL MEDICINE

## 2021-10-08 PROCEDURE — 77063 MAMMO DIGITAL SCREENING BILAT WITH TOMO: ICD-10-PCS | Mod: 26,,, | Performed by: RADIOLOGY

## 2021-10-15 ENCOUNTER — PATIENT MESSAGE (OUTPATIENT)
Dept: GASTROENTEROLOGY | Facility: CLINIC | Age: 41
End: 2021-10-15
Payer: COMMERCIAL

## 2021-10-15 LAB
H PYLORI AG STL QL IA: NORMAL
SPECIMEN SOURCE: 1

## 2021-12-07 ENCOUNTER — IMMUNIZATION (OUTPATIENT)
Dept: INTERNAL MEDICINE | Facility: CLINIC | Age: 41
End: 2021-12-07
Payer: COMMERCIAL

## 2021-12-07 DIAGNOSIS — Z23 NEED FOR VACCINATION: Primary | ICD-10-CM

## 2021-12-07 PROCEDURE — 0004A COVID-19, MRNA, LNP-S, PF, 30 MCG/0.3 ML DOSE VACCINE: CPT | Mod: CV19,PBBFAC | Performed by: INTERNAL MEDICINE

## 2022-01-03 ENCOUNTER — LAB VISIT (OUTPATIENT)
Dept: PRIMARY CARE CLINIC | Facility: OTHER | Age: 42
End: 2022-01-03
Payer: COMMERCIAL

## 2022-01-03 DIAGNOSIS — R05.9 COUGH: ICD-10-CM

## 2022-01-03 PROCEDURE — U0003 INFECTIOUS AGENT DETECTION BY NUCLEIC ACID (DNA OR RNA); SEVERE ACUTE RESPIRATORY SYNDROME CORONAVIRUS 2 (SARS-COV-2) (CORONAVIRUS DISEASE [COVID-19]), AMPLIFIED PROBE TECHNIQUE, MAKING USE OF HIGH THROUGHPUT TECHNOLOGIES AS DESCRIBED BY CMS-2020-01-R: HCPCS | Performed by: INTERNAL MEDICINE

## 2022-01-06 LAB
SARS-COV-2 RNA RESP QL NAA+PROBE: DETECTED
SARS-COV-2- CYCLE NUMBER: 26

## 2022-01-13 ENCOUNTER — OFFICE VISIT (OUTPATIENT)
Dept: PODIATRY | Facility: CLINIC | Age: 42
End: 2022-01-13
Payer: COMMERCIAL

## 2022-01-13 VITALS
BODY MASS INDEX: 25.76 KG/M2 | HEIGHT: 62 IN | WEIGHT: 140 LBS | DIASTOLIC BLOOD PRESSURE: 56 MMHG | HEART RATE: 82 BPM | SYSTOLIC BLOOD PRESSURE: 122 MMHG

## 2022-01-13 DIAGNOSIS — M79.671 FOOT PAIN, BILATERAL: ICD-10-CM

## 2022-01-13 DIAGNOSIS — M77.9 CAPSULITIS: ICD-10-CM

## 2022-01-13 DIAGNOSIS — M79.672 FOOT PAIN, BILATERAL: ICD-10-CM

## 2022-01-13 DIAGNOSIS — Q82.8 POROKERATOSIS: Primary | ICD-10-CM

## 2022-01-13 PROCEDURE — 1159F MED LIST DOCD IN RCRD: CPT | Mod: CPTII,S$GLB,, | Performed by: PODIATRIST

## 2022-01-13 PROCEDURE — 3074F PR MOST RECENT SYSTOLIC BLOOD PRESSURE < 130 MM HG: ICD-10-PCS | Mod: CPTII,S$GLB,, | Performed by: PODIATRIST

## 2022-01-13 PROCEDURE — 3008F PR BODY MASS INDEX (BMI) DOCUMENTED: ICD-10-PCS | Mod: CPTII,S$GLB,, | Performed by: PODIATRIST

## 2022-01-13 PROCEDURE — 1159F PR MEDICATION LIST DOCUMENTED IN MEDICAL RECORD: ICD-10-PCS | Mod: CPTII,S$GLB,, | Performed by: PODIATRIST

## 2022-01-13 PROCEDURE — 99999 PR PBB SHADOW E&M-EST. PATIENT-LVL III: ICD-10-PCS | Mod: PBBFAC,,, | Performed by: PODIATRIST

## 2022-01-13 PROCEDURE — 99213 OFFICE O/P EST LOW 20 MIN: CPT | Mod: S$GLB,,, | Performed by: PODIATRIST

## 2022-01-13 PROCEDURE — 99213 PR OFFICE/OUTPT VISIT, EST, LEVL III, 20-29 MIN: ICD-10-PCS | Mod: S$GLB,,, | Performed by: PODIATRIST

## 2022-01-13 PROCEDURE — 3008F BODY MASS INDEX DOCD: CPT | Mod: CPTII,S$GLB,, | Performed by: PODIATRIST

## 2022-01-13 PROCEDURE — 3078F PR MOST RECENT DIASTOLIC BLOOD PRESSURE < 80 MM HG: ICD-10-PCS | Mod: CPTII,S$GLB,, | Performed by: PODIATRIST

## 2022-01-13 PROCEDURE — 99999 PR PBB SHADOW E&M-EST. PATIENT-LVL III: CPT | Mod: PBBFAC,,, | Performed by: PODIATRIST

## 2022-01-13 PROCEDURE — 3078F DIAST BP <80 MM HG: CPT | Mod: CPTII,S$GLB,, | Performed by: PODIATRIST

## 2022-01-13 PROCEDURE — 3074F SYST BP LT 130 MM HG: CPT | Mod: CPTII,S$GLB,, | Performed by: PODIATRIST

## 2022-01-13 NOTE — PROGRESS NOTES
Subjective:      Patient ID: Evelia Mishra is a 41 y.o. female.    Chief Complaint: Bunions (L foot)    Sharp throbbing deep pain in the forefoot right and left.  Gradual onset with skin lesions associated.  Aggravated by increased pressure prolonged standing some shoes.  No prior medical treatment.  No self-treatment.  Patient denies trauma and surgery both feet    Review of Systems   Constitutional: Negative for chills, diaphoresis, fever, malaise/fatigue and night sweats.   Cardiovascular: Negative for claudication, cyanosis, leg swelling and syncope.   Skin: Positive for suspicious lesions. Negative for color change, dry skin, nail changes, rash and unusual hair distribution.   Musculoskeletal: Positive for joint pain. Negative for falls, joint swelling, muscle cramps, muscle weakness and stiffness.   Gastrointestinal: Negative for constipation, diarrhea, nausea and vomiting.   Neurological: Negative for brief paralysis, disturbances in coordination, focal weakness, numbness, paresthesias, sensory change and tremors.           Objective:      Physical Exam  Constitutional:       General: She is not in acute distress.     Appearance: She is well-developed and well-nourished. She is not diaphoretic.   Cardiovascular:      Pulses:           Popliteal pulses are 2+ on the right side and 2+ on the left side.        Dorsalis pedis pulses are 2+ on the right side and 2+ on the left side.        Posterior tibial pulses are 2+ on the right side and 2+ on the left side.      Comments: Capillary refill 3 seconds all toes/distal feet, all toes/both feet warm to touch.      Negative lymphadenopathy bilateral popliteal fossa and tarsal tunnel.      Negavie lower extremity edema bilateral.    Musculoskeletal:      Right ankle: No swelling, deformity, ecchymosis or lacerations. Normal range of motion. Normal pulse.      Right Achilles Tendon: Normal. No pain or defects. Mejia's test negative.      Comments: Pain to  palpation inferior mtpj 2 right, 5 left   without evidence of trauma or infection.      Ankle dorsiflexion decreased at <10 degrees bilateral with moderate increase with knee flexion bilateral.    Otherwise, Normal angle, base, station of gait. All ten toes without clubbing, cyanosis, or signs of ischemia.  No pain to palpation bilateral lower extremities.  Range of motion, stability, muscle strength, and muscle tone normal bilateral feet and legs.    Lymphadenopathy:      Lower Body: No right inguinal adenopathy. No left inguinal adenopathy.      Comments: Negative lymphadenopathy bilateral popliteal fossa and tarsal tunnel.    Negative lymphangitic streaking bilateral feet/ankles/legs.   Skin:     General: Skin is warm, dry and intact.      Capillary Refill: Capillary refill takes 2 to 3 seconds.      Coloration: Skin is not pale.      Findings: No abrasion, bruising, burn, ecchymosis, erythema, laceration, lesion or rash.      Nails: There is no clubbing or cyanosis.      Comments: Focal hyperkeratotic lesion consisting entirely of hyperkeratotic tissue without open skin, drainage, pus, fluctuance, malodor, or signs of infection plantar mtpj 2 right, 5 left lateral midfoot right.    Otherwise, .  Skin is normal age and health appropriate color, turgor, texture, and temperature bilateral lower extremities without ulceration, hyperpigmentation, discoloration, masses nodules or cords palpated.  No ecchymosis, erythema, edema, or cardinal signs of infection bilateral lower extremities.       Neurological:      Mental Status: She is alert and oriented to person, place, and time.      Sensory: No sensory deficit.      Motor: No tremor, atrophy or abnormal muscle tone.      Gait: Gait normal.      Deep Tendon Reflexes: Strength normal.      Reflex Scores:       Patellar reflexes are 2+ on the right side and 2+ on the left side.       Achilles reflexes are 2+ on the right side and 2+ on the left side.     Comments:  Negative tinel sign to percussion sural, superficial peroneal, deep peroneal, saphenous, and posterior tibial nerves right and left ankles and feet.       Psychiatric:         Mood and Affect: Mood and affect normal.         Behavior: Behavior is cooperative.               Assessment:       Encounter Diagnoses   Name Primary?    Porokeratosis Yes    Foot pain, bilateral     Capsulitis          Plan:       Evelia was seen today for bunions.    Diagnoses and all orders for this visit:    Porokeratosis    Foot pain, bilateral    Capsulitis      I counseled the patient on her conditions, their implications and medical management.         Stretch the calves were good over-the-counter arch supports to reduce forefoot pressure.    Wear most comfortable athletic shoes for good shock absorption arch support.    Topical urea cream twice daily to help with symptoms.  Periodic maintenance with pumice stone.  Follow as needed.          Follow up if symptoms worsen or fail to improve.

## 2022-02-07 NOTE — PROGRESS NOTES
"  Chief Complaint   Patient presents with    Ear Fullness              HPI: Evelia Mishra is a 41 y.o. female who is self-referred for bilateral occasional ear pain, ear stuffiness",ear fullness and ear popping which has been present for several weeks ago..  She reports the symptoms have been are intermittent episodes.  She has been experiencing nasal congestion, post nasal drip, rhinorrhea and hearing loss.  The patient reports symptoms of allergic rhinitis/chronic rhinitis including congestions, postnasal drip, rhinorrhea, sneezing, itching, and sinus pressure.  She has azelastine and Claritin which she takes occasionally but not daily or consistently.  She does have a long history of allergies for which she has previously had allergy testing when she lived in [place].  Her allergies in [place] have been better but she does still wasserman with flare ups intermittently.  She has seen Allergy immunology previously.  She reports that the ear symptoms began on a plane flight in early January and during descent she started to notice pain in the left ear especially.  The left ear has now continued to have the fullness and a feeling that something is moving in the ear though it has improved over time.  She did not seek any treatment at that time.    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     Pelvic muscle wasting 10/7/2013    Seasonal allergies 7/17/2019    Subclinical hypothyroidism 02/14/2018    pregnancy related.    Vitamin D insufficiency 1/22/2018     Social History     Socioeconomic History    Marital status:    Occupational History    Occupation: physician "     Employer: [employer]     Comment: Hospital medicine   Tobacco Use    Smoking status: Never Smoker    Smokeless tobacco: Never Used   Substance and Sexual Activity    Alcohol use: No     Alcohol/week: 0.0 standard drinks    Drug use: No    Sexual activity: Not Currently     Partners: Male     Birth control/protection: None   Other Topics Concern    Are you pregnant or think you may be? No    Breast-feeding No     Past Surgical History:   Procedure Laterality Date    DIAGNOSTIC LAPAROSCOPY N/A 8/22/2018    Procedure: LAPAROSCOPY, DIAGNOSTIC;  Surgeon: Mark Parrish MD;  Location: [facility] OR;  Service: OB/GYN;  Laterality: N/A;    ESOPHAGOGASTRODUODENOSCOPY N/A 7/19/2021    Procedure: EGD (ESOPHAGOGASTRODUODENOSCOPY);  Surgeon: Mitul Shoemaker MD;  Location: [facility] ENDO;  Service: Endoscopy;  Laterality: N/A;    LAPAROSCOPIC TREATMENT OF ECTOPIC PREGNANCY Left 8/22/2018    Procedure: REMOVAL, ECTOPIC PREGNANCY, LAPAROSCOPIC;  Surgeon: Mark Parrish MD;  Location: [facility] OR;  Service: OB/GYN;  Laterality: Left;     Family History   Problem Relation Age of Onset    Hyperlipidemia Father     Diabetes Maternal Grandmother     Cataracts Maternal Grandmother     Diabetes Paternal Grandmother     Diabetes Maternal Uncle     Diabetes Paternal Uncle     Hypothyroidism Paternal Cousin     Thyroid disease Neg Hx     Breast cancer Neg Hx     Colon cancer Neg Hx     Ovarian cancer Neg Hx            Review of Systems  General: negative for chills, fever or weight loss  Psychological: negative for mood changes or depression  Ophthalmic: negative for blurry vision, photophobia or eye pain  ENT: see HPI  Respiratory: no cough, shortness of breath, or wheezing  Cardiovascular: no chest pain or dyspnea on exertion  Gastrointestinal: no abdominal pain, change in bowel habits, or black/ bloody stools  Musculoskeletal: negative for gait disturbance or muscular  weakness  Neurological: no syncope or seizures; no ataxia  Dermatological: negative for pruritis,  rash and jaundice  Hematologic/lymphatic: no easy bruising, no new adenopathy      Physical Exam:    Vitals:    02/08/22 0837   BP: 116/77   Pulse: 71   Resp: 16       Constitutional: Well appearing / communicating without difficutly.  NAD.  Eyes: EOM I Bilaterally  Head/Face: Normocephalic.  Negative paranasal sinus pressure/tenderness.  Salivary glands WNL.  House Brackmann I Bilaterally.    Right Ear: Auricle normal appearance. External Auditory Canal within normal limits no lesions or masses,TM within normal limits, no effusion noted.    Left Ear: Auricle normal appearance. External Auditory Canal within normal limits no lesions or masses, TM within normal limits, no effusion noted.  Nose:  Slight nasal septal deviation to the left anteriorly.  No spurs noted.. Inferior Turbinates 3+ bilaterally, with left side being slightly more enlarged and some polypoid change to the mucosa. No septal perforation. No masses/lesions. External nasal skin appears normal without masses/lesions.  Oral Cavity: Gingiva/lips within normal limits.  Dentition/gingiva healthy appearing. Mucus membranes moist. Floor of mouth soft, no masses palpated. Oral Tongue mobile. Hard Palate appears normal.    Oropharynx: Base of tongue appears normal. No masses/lesions noted. Tonsillar fossa/pharyngeal wall without lesions. Posterior oropharynx WNL.  Soft palate without masses. Midline uvula.   Neck/Lymphatic: No LAD I-VI bilaterally.  No thyromegaly.  No masses noted on exam.    Mirror laryngoscopy/nasopharyngoscopy: Active gag reflex.  Unable to perform.    Neuro/Psychiatric: AOx3.  Normal mood and affect.   Cardiovascular: Normal carotid pulses bilaterally, no increasing jugular venous distention noted at cervical region bilaterally.    Respiratory: Normal respiratory effort, no stridor, no retractions noted.    Audiogram: interpreted by me and  reviewed with the patient today.           Assessment:    ICD-10-CM ICD-9-CM    1. Allergic rhinitis, unspecified seasonality, unspecified trigger  J30.9 477.9    2. Dysfunction of Eustachian tube, unspecified laterality  H69.80 381.81    3. Hearing loss, unspecified hearing loss type, unspecified laterality  H91.90 389.9    4. Hypertrophy of both inferior nasal turbinates  J34.3 478.0    5. Deviated nasal septum  J34.2 470      The primary encounter diagnosis was Allergic rhinitis, unspecified seasonality, unspecified trigger. Diagnoses of Dysfunction of Eustachian tube, unspecified laterality, Hearing loss, unspecified hearing loss type, unspecified laterality, Hypertrophy of both inferior nasal turbinates, and Deviated nasal septum were also pertinent to this visit.      Plan:  The patient was given a prescription for a nasal steroid and/or nasal antihistamine nasal spray and we discussed in detail the proper mechanism of use directing the spray away from the nasal septum.  The patient was also instructed to take a daily oral antihistamine (Claritin, Zyrtec, Allegra, or Xyzal).    In addition, we also discussed that it will take 3-4 weeks of daily use to achieve maximal effectiveness.  The patient will please call in 3-4 weeks with their progress.  If allergy symptoms persist at that time, we could consider additional medications, for workup of her sinus issues and discussion of surgical options, and possibly allergy testing.    We had a long discussion regarding the anatomy and function of the eustachian tube.  We discussed that the eustachian tube acts as a pump to keep the appropriate amount of pressure behind the ear drum. I discussed auto-insufflation exercises.     I gave the patient a prescription for a nasal steroid spray to be used on a daily basis, and we discussed that it will take 2-3 weeks of daily use to achieve maximal effectiveness.    Follow up in 4-6 months.      Kimberley Jarrett MD

## 2022-02-08 ENCOUNTER — CLINICAL SUPPORT (OUTPATIENT)
Dept: OTOLARYNGOLOGY | Facility: CLINIC | Age: 42
End: 2022-02-08
Payer: COMMERCIAL

## 2022-02-08 ENCOUNTER — OFFICE VISIT (OUTPATIENT)
Dept: OTOLARYNGOLOGY | Facility: CLINIC | Age: 42
End: 2022-02-08
Payer: COMMERCIAL

## 2022-02-08 VITALS
HEART RATE: 71 BPM | WEIGHT: 146.81 LBS | SYSTOLIC BLOOD PRESSURE: 116 MMHG | DIASTOLIC BLOOD PRESSURE: 77 MMHG | HEIGHT: 62 IN | RESPIRATION RATE: 16 BRPM | BODY MASS INDEX: 27.02 KG/M2

## 2022-02-08 DIAGNOSIS — H91.90 HEARING LOSS, UNSPECIFIED HEARING LOSS TYPE, UNSPECIFIED LATERALITY: ICD-10-CM

## 2022-02-08 DIAGNOSIS — J30.9 ALLERGIC RHINITIS, UNSPECIFIED SEASONALITY, UNSPECIFIED TRIGGER: Primary | Chronic | ICD-10-CM

## 2022-02-08 DIAGNOSIS — Z01.818 PRE-PROCEDURAL EXAMINATION: ICD-10-CM

## 2022-02-08 DIAGNOSIS — J34.2 DEVIATED NASAL SEPTUM: Chronic | ICD-10-CM

## 2022-02-08 DIAGNOSIS — J34.3 HYPERTROPHY OF BOTH INFERIOR NASAL TURBINATES: Chronic | ICD-10-CM

## 2022-02-08 DIAGNOSIS — H69.90 DYSFUNCTION OF EUSTACHIAN TUBE, UNSPECIFIED LATERALITY: Chronic | ICD-10-CM

## 2022-02-08 DIAGNOSIS — H93.293 ABNORMAL AUDITORY PERCEPTION, BILATERAL: Primary | ICD-10-CM

## 2022-02-08 PROCEDURE — 99214 OFFICE O/P EST MOD 30 MIN: CPT | Mod: S$GLB,,, | Performed by: OTOLARYNGOLOGY

## 2022-02-08 PROCEDURE — 99999 PR PBB SHADOW E&M-EST. PATIENT-LVL III: ICD-10-PCS | Mod: PBBFAC,,, | Performed by: OTOLARYNGOLOGY

## 2022-02-08 PROCEDURE — 1159F PR MEDICATION LIST DOCUMENTED IN MEDICAL RECORD: ICD-10-PCS | Mod: CPTII,S$GLB,, | Performed by: OTOLARYNGOLOGY

## 2022-02-08 PROCEDURE — 3078F PR MOST RECENT DIASTOLIC BLOOD PRESSURE < 80 MM HG: ICD-10-PCS | Mod: CPTII,S$GLB,, | Performed by: OTOLARYNGOLOGY

## 2022-02-08 PROCEDURE — 3074F SYST BP LT 130 MM HG: CPT | Mod: CPTII,S$GLB,, | Performed by: OTOLARYNGOLOGY

## 2022-02-08 PROCEDURE — 99999 PR PBB SHADOW E&M-EST. PATIENT-LVL III: CPT | Mod: PBBFAC,,, | Performed by: OTOLARYNGOLOGY

## 2022-02-08 PROCEDURE — 1160F RVW MEDS BY RX/DR IN RCRD: CPT | Mod: CPTII,S$GLB,, | Performed by: OTOLARYNGOLOGY

## 2022-02-08 PROCEDURE — 3078F DIAST BP <80 MM HG: CPT | Mod: CPTII,S$GLB,, | Performed by: OTOLARYNGOLOGY

## 2022-02-08 PROCEDURE — 1160F PR REVIEW ALL MEDS BY PRESCRIBER/CLIN PHARMACIST DOCUMENTED: ICD-10-PCS | Mod: CPTII,S$GLB,, | Performed by: OTOLARYNGOLOGY

## 2022-02-08 PROCEDURE — 92557 COMPREHENSIVE HEARING TEST: CPT | Mod: S$GLB,,, | Performed by: AUDIOLOGIST

## 2022-02-08 PROCEDURE — 92557 PR COMPREHENSIVE HEARING TEST: ICD-10-PCS | Mod: S$GLB,,, | Performed by: AUDIOLOGIST

## 2022-02-08 PROCEDURE — 3074F PR MOST RECENT SYSTOLIC BLOOD PRESSURE < 130 MM HG: ICD-10-PCS | Mod: CPTII,S$GLB,, | Performed by: OTOLARYNGOLOGY

## 2022-02-08 PROCEDURE — 3008F PR BODY MASS INDEX (BMI) DOCUMENTED: ICD-10-PCS | Mod: CPTII,S$GLB,, | Performed by: OTOLARYNGOLOGY

## 2022-02-08 PROCEDURE — 99999 PR PBB SHADOW E&M-EST. PATIENT-LVL I: ICD-10-PCS | Mod: PBBFAC,,, | Performed by: AUDIOLOGIST

## 2022-02-08 PROCEDURE — 92567 TYMPANOMETRY: CPT | Mod: S$GLB,,, | Performed by: AUDIOLOGIST

## 2022-02-08 PROCEDURE — 99214 PR OFFICE/OUTPT VISIT, EST, LEVL IV, 30-39 MIN: ICD-10-PCS | Mod: S$GLB,,, | Performed by: OTOLARYNGOLOGY

## 2022-02-08 PROCEDURE — 3008F BODY MASS INDEX DOCD: CPT | Mod: CPTII,S$GLB,, | Performed by: OTOLARYNGOLOGY

## 2022-02-08 PROCEDURE — 99999 PR PBB SHADOW E&M-EST. PATIENT-LVL I: CPT | Mod: PBBFAC,,, | Performed by: AUDIOLOGIST

## 2022-02-08 PROCEDURE — 1159F MED LIST DOCD IN RCRD: CPT | Mod: CPTII,S$GLB,, | Performed by: OTOLARYNGOLOGY

## 2022-02-08 PROCEDURE — 92567 PR TYMPA2METRY: ICD-10-PCS | Mod: S$GLB,,, | Performed by: AUDIOLOGIST

## 2022-02-08 RX ORDER — FLUTICASONE PROPIONATE 50 MCG
1 SPRAY, SUSPENSION (ML) NASAL 2 TIMES DAILY
Qty: 11.1 ML | Refills: 11 | Status: SHIPPED | OUTPATIENT
Start: 2022-02-08

## 2022-02-08 RX ORDER — LORATADINE 10 MG/1
10 TABLET ORAL DAILY
Qty: 30 TABLET | Refills: 0 | Status: SHIPPED | OUTPATIENT
Start: 2022-02-08 | End: 2022-05-19 | Stop reason: SDUPTHER

## 2022-02-08 NOTE — PATIENT INSTRUCTIONS
The patient was given a prescription for a nasal steroid and/or nasal antihistamine nasal spray and we discussed in detail the proper mechanism of use directing the spray away from the nasal septum.  The patient was also instructed to take a daily oral antihistamine (Claritin, Zyrtec, Allegra, or Xyzal).    In addition, we also discussed that it will take 3-4 weeks of daily use to achieve maximal effectiveness.  The patient will please call in 3-4 weeks with their progress.  If allergy symptoms persist at that time, we could consider additional medications and possibly allergy testing.    We had a long discussion regarding the anatomy and function of the eustachian tube.  We discussed that the eustachian tube acts as a pump to keep the appropriate amount of pressure behind the ear drum.  I gave the patient a prescription for a nasal steroid spray to be used on a daily basis, and we discussed that it will take 2-3 weeks of daily use to achieve maximal effectiveness.  We also discussed otologic valsalva daily for gently depressurizing the middle ear.      How do you use a Nasal Corticosteroid Spray?    Make sure you understand your dosing instructions. Spray only the number of prescribed sprays in each nostril. Read the package instructions before using your spray the first time.    Most corticosteroid sprays suggest the following steps:    Wash your hands well.    Gently blow your nose to clear the passageway.    Shake the container several times.    Tilt your head slightly downward.  Use the opposite hand from the nostril you will be spraying to hold the spray bottle.    Block one nostril with your finger.  Insert the nasal applicator into the other nostril.    Aim the spray toward the outer wall of the nostril.  Inhale slowly through the nose and press the .    Breathe out and repeat to apply the prescribed number of sprays.  Repeat these steps for the other nostril.     Avoid sneezing or blowing  your nose right after spraying.

## 2022-02-08 NOTE — PROGRESS NOTES
Evelia Mishra was seen today in the clinic for an audiologic evaluation. Her main complaint was ear fullness in both ears but mostly the left ear.    Tympanometry revealed a Type A tympanogram for both ears.  Audiogram results revealed hearing within normal limits bilaterally.  Speech reception thresholds were obtained at 5dB for the right ear and 10dB for the left ear.  Speech discrimination scores were 100% for both ears.    Recommendations:  1. Otologic evaluation  2. Follow up hearing test as needed  3. Hearing protection in noise

## 2022-04-10 ENCOUNTER — PATIENT MESSAGE (OUTPATIENT)
Dept: ALLERGY | Facility: CLINIC | Age: 42
End: 2022-04-10
Payer: COMMERCIAL

## 2022-05-17 ENCOUNTER — PATIENT MESSAGE (OUTPATIENT)
Dept: GASTROENTEROLOGY | Facility: CLINIC | Age: 42
End: 2022-05-17
Payer: COMMERCIAL

## 2022-05-17 DIAGNOSIS — R14.0 BLOATING: Primary | ICD-10-CM

## 2022-05-18 ENCOUNTER — PATIENT MESSAGE (OUTPATIENT)
Dept: GASTROENTEROLOGY | Facility: CLINIC | Age: 42
End: 2022-05-18
Payer: COMMERCIAL

## 2022-05-19 ENCOUNTER — LAB VISIT (OUTPATIENT)
Dept: LAB | Facility: HOSPITAL | Age: 42
End: 2022-05-19
Attending: INTERNAL MEDICINE
Payer: COMMERCIAL

## 2022-05-19 ENCOUNTER — LAB VISIT (OUTPATIENT)
Dept: LAB | Facility: HOSPITAL | Age: 42
End: 2022-05-19
Attending: ALLERGY & IMMUNOLOGY
Payer: COMMERCIAL

## 2022-05-19 ENCOUNTER — OFFICE VISIT (OUTPATIENT)
Dept: ALLERGY | Facility: CLINIC | Age: 42
End: 2022-05-19
Payer: COMMERCIAL

## 2022-05-19 VITALS — HEIGHT: 64 IN | BODY MASS INDEX: 24.46 KG/M2 | WEIGHT: 143.31 LBS

## 2022-05-19 DIAGNOSIS — L50.8 CHRONIC URTICARIA: Primary | ICD-10-CM

## 2022-05-19 DIAGNOSIS — L50.9 URTICARIA: ICD-10-CM

## 2022-05-19 DIAGNOSIS — R14.0 BLOATING: ICD-10-CM

## 2022-05-19 DIAGNOSIS — J30.2 SEASONAL ALLERGIES: ICD-10-CM

## 2022-05-19 LAB
ALBUMIN SERPL BCP-MCNC: 4.1 G/DL (ref 3.5–5.2)
ALP SERPL-CCNC: 65 U/L (ref 55–135)
ALT SERPL W/O P-5'-P-CCNC: 22 U/L (ref 10–44)
ANION GAP SERPL CALC-SCNC: 7 MMOL/L (ref 8–16)
AST SERPL-CCNC: 20 U/L (ref 10–40)
BASOPHILS # BLD AUTO: 0.03 K/UL (ref 0–0.2)
BASOPHILS NFR BLD: 0.5 % (ref 0–1.9)
BILIRUB SERPL-MCNC: 0.7 MG/DL (ref 0.1–1)
BUN SERPL-MCNC: 11 MG/DL (ref 6–20)
CALCIUM SERPL-MCNC: 9.5 MG/DL (ref 8.7–10.5)
CHLORIDE SERPL-SCNC: 104 MMOL/L (ref 95–110)
CO2 SERPL-SCNC: 28 MMOL/L (ref 23–29)
CREAT SERPL-MCNC: 0.7 MG/DL (ref 0.5–1.4)
DIFFERENTIAL METHOD: NORMAL
EOSINOPHIL # BLD AUTO: 0.1 K/UL (ref 0–0.5)
EOSINOPHIL NFR BLD: 1.6 % (ref 0–8)
ERYTHROCYTE [DISTWIDTH] IN BLOOD BY AUTOMATED COUNT: 12.2 % (ref 11.5–14.5)
EST. GFR  (AFRICAN AMERICAN): >60 ML/MIN/1.73 M^2
EST. GFR  (NON AFRICAN AMERICAN): >60 ML/MIN/1.73 M^2
GLUCOSE SERPL-MCNC: 91 MG/DL (ref 70–110)
HCT VFR BLD AUTO: 39.1 % (ref 37–48.5)
HGB BLD-MCNC: 13.2 G/DL (ref 12–16)
IMM GRANULOCYTES # BLD AUTO: 0.01 K/UL (ref 0–0.04)
IMM GRANULOCYTES NFR BLD AUTO: 0.2 % (ref 0–0.5)
LYMPHOCYTES # BLD AUTO: 2.1 K/UL (ref 1–4.8)
LYMPHOCYTES NFR BLD: 37.1 % (ref 18–48)
MCH RBC QN AUTO: 30.9 PG (ref 27–31)
MCHC RBC AUTO-ENTMCNC: 33.8 G/DL (ref 32–36)
MCV RBC AUTO: 92 FL (ref 82–98)
MONOCYTES # BLD AUTO: 0.4 K/UL (ref 0.3–1)
MONOCYTES NFR BLD: 7.2 % (ref 4–15)
NEUTROPHILS # BLD AUTO: 3 K/UL (ref 1.8–7.7)
NEUTROPHILS NFR BLD: 53.4 % (ref 38–73)
NRBC BLD-RTO: 0 /100 WBC
PLATELET # BLD AUTO: 278 K/UL (ref 150–450)
PMV BLD AUTO: 10 FL (ref 9.2–12.9)
POTASSIUM SERPL-SCNC: 4.8 MMOL/L (ref 3.5–5.1)
PROT SERPL-MCNC: 7.7 G/DL (ref 6–8.4)
RBC # BLD AUTO: 4.27 M/UL (ref 4–5.4)
SODIUM SERPL-SCNC: 139 MMOL/L (ref 136–145)
THYROGLOB AB SERPL IA-ACNC: <4 IU/ML (ref 0–3.9)
THYROPEROXIDASE IGG SERPL-ACNC: <6 IU/ML
TSH SERPL DL<=0.005 MIU/L-ACNC: 2.19 UIU/ML (ref 0.4–4)
WBC # BLD AUTO: 5.55 K/UL (ref 3.9–12.7)

## 2022-05-19 PROCEDURE — 86003 ALLG SPEC IGE CRUDE XTRC EA: CPT | Performed by: ALLERGY & IMMUNOLOGY

## 2022-05-19 PROCEDURE — 99214 PR OFFICE/OUTPT VISIT, EST, LEVL IV, 30-39 MIN: ICD-10-PCS | Mod: S$GLB,,, | Performed by: ALLERGY & IMMUNOLOGY

## 2022-05-19 PROCEDURE — 88184 FLOWCYTOMETRY/ TC 1 MARKER: CPT | Performed by: ALLERGY & IMMUNOLOGY

## 2022-05-19 PROCEDURE — 84165 PROTEIN E-PHORESIS SERUM: CPT | Performed by: ALLERGY & IMMUNOLOGY

## 2022-05-19 PROCEDURE — 99214 OFFICE O/P EST MOD 30 MIN: CPT | Mod: S$GLB,,, | Performed by: ALLERGY & IMMUNOLOGY

## 2022-05-19 PROCEDURE — 86003 ALLG SPEC IGE CRUDE XTRC EA: CPT | Mod: 59 | Performed by: ALLERGY & IMMUNOLOGY

## 2022-05-19 PROCEDURE — 3008F PR BODY MASS INDEX (BMI) DOCUMENTED: ICD-10-PCS | Mod: CPTII,S$GLB,, | Performed by: ALLERGY & IMMUNOLOGY

## 2022-05-19 PROCEDURE — 99999 PR PBB SHADOW E&M-EST. PATIENT-LVL III: CPT | Mod: PBBFAC,,, | Performed by: ALLERGY & IMMUNOLOGY

## 2022-05-19 PROCEDURE — 86376 MICROSOMAL ANTIBODY EACH: CPT | Performed by: ALLERGY & IMMUNOLOGY

## 2022-05-19 PROCEDURE — 84443 ASSAY THYROID STIM HORMONE: CPT | Performed by: ALLERGY & IMMUNOLOGY

## 2022-05-19 PROCEDURE — 80053 COMPREHEN METABOLIC PANEL: CPT | Performed by: ALLERGY & IMMUNOLOGY

## 2022-05-19 PROCEDURE — 87338 HPYLORI STOOL AG IA: CPT | Performed by: INTERNAL MEDICINE

## 2022-05-19 PROCEDURE — 36415 COLL VENOUS BLD VENIPUNCTURE: CPT | Performed by: ALLERGY & IMMUNOLOGY

## 2022-05-19 PROCEDURE — 1159F MED LIST DOCD IN RCRD: CPT | Mod: CPTII,S$GLB,, | Performed by: ALLERGY & IMMUNOLOGY

## 2022-05-19 PROCEDURE — 1159F PR MEDICATION LIST DOCUMENTED IN MEDICAL RECORD: ICD-10-PCS | Mod: CPTII,S$GLB,, | Performed by: ALLERGY & IMMUNOLOGY

## 2022-05-19 PROCEDURE — 85025 COMPLETE CBC W/AUTO DIFF WBC: CPT | Performed by: ALLERGY & IMMUNOLOGY

## 2022-05-19 PROCEDURE — 84165 PATHOLOGIST INTERPRETATION SPE: ICD-10-PCS | Mod: 26,,, | Performed by: PATHOLOGY

## 2022-05-19 PROCEDURE — 99999 PR PBB SHADOW E&M-EST. PATIENT-LVL III: ICD-10-PCS | Mod: PBBFAC,,, | Performed by: ALLERGY & IMMUNOLOGY

## 2022-05-19 PROCEDURE — 3008F BODY MASS INDEX DOCD: CPT | Mod: CPTII,S$GLB,, | Performed by: ALLERGY & IMMUNOLOGY

## 2022-05-19 PROCEDURE — 84165 PROTEIN E-PHORESIS SERUM: CPT | Mod: 26,,, | Performed by: PATHOLOGY

## 2022-05-19 PROCEDURE — 86800 THYROGLOBULIN ANTIBODY: CPT | Performed by: ALLERGY & IMMUNOLOGY

## 2022-05-19 RX ORDER — LORATADINE 10 MG/1
10 TABLET ORAL DAILY
Qty: 30 TABLET | Refills: 11 | Status: SHIPPED | OUTPATIENT
Start: 2022-05-19 | End: 2023-06-23

## 2022-05-19 RX ORDER — AZELASTINE 1 MG/ML
2 SPRAY, METERED NASAL 2 TIMES DAILY
Qty: 30 ML | Refills: 11 | Status: SHIPPED | OUTPATIENT
Start: 2022-05-19 | End: 2023-06-23

## 2022-05-19 NOTE — PROGRESS NOTES
LV 2/2/21       Patient ID: Evelia Mishra is a 41 y.o. female.    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 Lesions are pruritic, migratory, non-scarring. Occasionally in same place up to 2 days. Denies assoc burning or pain. Mostly arms and legs affected. Does improve w prn loratidine 10 mg, only taken a few times. Sx's do not tend to disturb sleep. W exacerbations of urticaria, no assoc extra cutaneous sx's reported. Exacerbations of urticaria not assoc w rhinitis sx's./  Has been having some increase belching, gastritis sx's. Is being evaluated by GI for poss another H. Pylori infection. O/w review of sx's is unremarkable.  Poss assoc w stress and urticaria but not certain.  Denies assoc btwn urticaria and NSAIDs  No obvious or suspected food or environmental triggers for urticaria. No assoc w temp extremes, activity, physical pressure.        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     Pelvic muscle wasting 10/7/2013    Seasonal allergies 7/17/2019    Subclinical hypothyroidism 02/14/2018    pregnancy related.    Vitamin D insufficiency 1/22/2018     Family History   Problem Relation Age of Onset    Hyperlipidemia Father     Diabetes Maternal Grandmother     Cataracts Maternal Grandmother     Diabetes Paternal Grandmother     Diabetes Maternal Uncle     Diabetes Paternal Uncle     Hypothyroidism Paternal Cousin     Thyroid disease Neg Hx     Breast cancer Neg Hx     Colon cancer Neg Hx     Ovarian cancer Neg Hx    dad w occ rhinitis  Daughter w  chronic rhinitis      Environmental History: Pets in the home: none.  Mikhail: [id number] carpeting  Tobacco Smoke in Home: no     Review of Systems   Constitutional: Negative for appetite change, chills, fatigue and fever.   HENT: Negative for congestion, ear discharge, facial swelling, postnasal drip, sinus pressure, sneezing, sore throat and voice change.    Eyes: Negative for photophobia, pain, discharge, redness and itching.   Respiratory: Negative for cough, shortness of breath and wheezing.    Cardiovascular: Negative for chest pain.   Gastrointestinal: Negative for abdominal pain, constipation, diarrhea, nausea and vomiting.   Genitourinary: Negative for difficulty urinating.   Musculoskeletal: Negative for arthralgias, joint swelling and myalgias.   Skin: Negative for color change and rash.        urticaria   Neurological: Negative for weakness and headaches.   Hematological: Negative for adenopathy. Does not bruise/bleed easily.   Psychiatric/Behavioral: Negative for behavioral problems.        Objective:    Physical Exam  Vitals and nursing note reviewed.   Constitutional:       General: She is not in acute distress.     Appearance: She is well-developed.   HENT:      Head: Normocephalic.      Right Ear: Hearing and external ear normal.      Left Ear: Hearing and external ear normal.      Nose: No septal deviation, mucosal edema or rhinorrhea.      Right Sinus: No maxillary sinus tenderness or frontal sinus tenderness.      Left Sinus: No maxillary sinus tenderness or frontal sinus tenderness.      Comments: Pale, boggy turbinates, L > R     Mouth/Throat:      Pharynx: Uvula midline. No uvula swelling.   Eyes:      General:         Right eye: No discharge.         Left eye: No discharge.   Neck:      Thyroid: No thyromegaly.   Cardiovascular:      Rate and Rhythm: Normal rate.   Pulmonary:      Effort: Pulmonary effort is normal. No respiratory distress.      Breath sounds: No wheezing.   Abdominal:       General: There is no distension.      Palpations: Abdomen is soft.   Musculoskeletal:         General: Normal range of motion.      Cervical back: Normal range of motion.   Skin:     General: Skin is warm.      Findings: No erythema.      Comments: Few urticarial lesions on R arm, lower legs   Neurological:      Mental Status: She is alert and oriented to person, place, and time.   Psychiatric:         Behavior: Behavior normal.         Laboratory:       Assessment:       1. Chronic urticaria , Consider poss spontaneous   2. Seasonal allergies         Plan:       1. Prn vs routine loratidine 10 mg daily. If needed, can titrate up to 20 mg twice daily and add 20 mg twice daily pepcid w loratidine.  2. OK prn benadryl for breakthrough itching disturbing sleep.  3. Check labs: cbc, cmp, tsh, thyroid abs, spep, FcERI. Will also check inhalant immunuCAPs, to try to ID poss AR triggers

## 2022-05-20 LAB
ALBUMIN SERPL ELPH-MCNC: 4.31 G/DL (ref 3.35–5.55)
ALPHA1 GLOB SERPL ELPH-MCNC: 0.29 G/DL (ref 0.17–0.41)
ALPHA2 GLOB SERPL ELPH-MCNC: 0.85 G/DL (ref 0.43–0.99)
B-GLOBULIN SERPL ELPH-MCNC: 0.8 G/DL (ref 0.5–1.1)
GAMMA GLOB SERPL ELPH-MCNC: 1.26 G/DL (ref 0.67–1.58)
PATHOLOGIST INTERPRETATION SPE: NORMAL
PROT SERPL-MCNC: 7.5 G/DL (ref 6–8.4)

## 2022-05-23 LAB
A ALTERNATA IGE QN: <0.1 KU/L
A FUMIGATUS IGE QN: <0.1 KU/L
BAHIA GRASS IGE QN: 4.77 KU/L
CAT DANDER IGE QN: <0.1 KU/L
CEDAR IGE QN: <0.1 KU/L
COMMON RAGWEED IGE QN: 2.4 KU/L
D FARINAE IGE QN: 0.11 KU/L
D PTERONYSS IGE QN: <0.1 KU/L
DEPRECATED A ALTERNATA IGE RAST QL: NORMAL
DEPRECATED A FUMIGATUS IGE RAST QL: NORMAL
DEPRECATED BAHIA GRASS IGE RAST QL: ABNORMAL
DEPRECATED CAT DANDER IGE RAST QL: NORMAL
DEPRECATED CEDAR IGE RAST QL: NORMAL
DEPRECATED COMMON RAGWEED IGE RAST QL: ABNORMAL
DEPRECATED D FARINAE IGE RAST QL: ABNORMAL
DEPRECATED D PTERONYSS IGE RAST QL: NORMAL
DEPRECATED DOG DANDER IGE RAST QL: ABNORMAL
DEPRECATED ELDER IGE RAST QL: NORMAL
DEPRECATED ENGL PLANTAIN IGE RAST QL: NORMAL
DEPRECATED PECAN/HICK TREE IGE RAST QL: NORMAL
DEPRECATED ROACH IGE RAST QL: NORMAL
DEPRECATED TIMOTHY IGE RAST QL: ABNORMAL
DEPRECATED WHITE OAK IGE RAST QL: ABNORMAL
DOG DANDER IGE QN: 0.21 KU/L
ELDER IGE QN: <0.1 KU/L
ENGL PLANTAIN IGE QN: <0.1 KU/L
PECAN/HICK TREE IGE QN: <0.1 KU/L
ROACH IGE QN: <0.1 KU/L
TIMOTHY IGE QN: 4.21 KU/L
WHITE OAK IGE QN: 4.46 KU/L

## 2022-05-24 ENCOUNTER — PATIENT MESSAGE (OUTPATIENT)
Dept: ALLERGY | Facility: CLINIC | Age: 42
End: 2022-05-24
Payer: COMMERCIAL

## 2022-05-25 ENCOUNTER — TELEPHONE (OUTPATIENT)
Dept: SPORTS MEDICINE | Facility: CLINIC | Age: 42
End: 2022-05-25
Payer: COMMERCIAL

## 2022-05-25 DIAGNOSIS — Z71.3 NUTRITIONAL COUNSELING: Primary | ICD-10-CM

## 2022-05-25 LAB
H PYLORI AG STL QL IA: NOT DETECTED
SPECIMEN SOURCE: 1

## 2022-05-25 NOTE — TELEPHONE ENCOUNTER
----- Message from Rosaline Rodriguez RD sent at 5/25/2022 10:08 AM CDT -----  Regarding: Nutrition Referral  Hello    Can you please enter a Nutrition Consult referral ([id number]) for Evelia Mishra for Nutritional Counseling.  Thank you!    Rosaline Rodriguez RD

## 2022-05-27 ENCOUNTER — TELEPHONE (OUTPATIENT)
Dept: INTERNAL MEDICINE | Facility: CLINIC | Age: 42
End: 2022-05-27
Payer: COMMERCIAL

## 2022-05-27 DIAGNOSIS — Z86.19 HISTORY OF HELICOBACTER PYLORI INFECTION: Primary | ICD-10-CM

## 2022-05-28 ENCOUNTER — PATIENT MESSAGE (OUTPATIENT)
Dept: GASTROENTEROLOGY | Facility: CLINIC | Age: 42
End: 2022-05-28
Payer: COMMERCIAL

## 2022-05-30 ENCOUNTER — NUTRITION (OUTPATIENT)
Dept: NUTRITION | Facility: CLINIC | Age: 42
End: 2022-05-30
Payer: COMMERCIAL

## 2022-05-30 DIAGNOSIS — Z71.3 NUTRITIONAL COUNSELING: ICD-10-CM

## 2022-05-30 PROCEDURE — 99999 PR PBB SHADOW E&M-EST. PATIENT-LVL II: ICD-10-PCS | Mod: PBBFAC,,, | Performed by: DIETITIAN, REGISTERED

## 2022-05-30 PROCEDURE — 99999 PR PBB SHADOW E&M-EST. PATIENT-LVL II: CPT | Mod: PBBFAC,,, | Performed by: DIETITIAN, REGISTERED

## 2022-05-30 PROCEDURE — 97802 MEDICAL NUTRITION INDIV IN: CPT | Mod: S$GLB,,, | Performed by: DIETITIAN, REGISTERED

## 2022-05-30 PROCEDURE — 97802 PR MED NUTR THER, 1ST, INDIV, EA 15 MIN: ICD-10-PCS | Mod: S$GLB,,, | Performed by: DIETITIAN, REGISTERED

## 2022-05-30 NOTE — PROGRESS NOTES
"Nutrition Assessment for Initial Medical Nutrition Therapy-- employee wellness consult      Reason for MNT visit: Pt in for education and nutrition counseling regarding desired weight loss and optimizing nutrition for preventative health.       ASSESSMENT    Age: 41 y.o.  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     Pelvic muscle wasting 10/7/2013    Seasonal allergies 7/17/2019    Subclinical hypothyroidism 02/14/2018    pregnancy related.    Vitamin D insufficiency 1/22/2018     Problem List           Resolved    History of positive PPD         Seasonal allergies         Right wrist pain               Medications:   Current Outpatient Medications:     azelastine (ASTELIN) 137 mcg (0.1 %) nasal spray, 2 sprays (274 mcg total) by Nasal route 2 (two) times daily., Disp: 30 mL, Rfl: 11    fluticasone propionate (FLONASE) 50 mcg/actuation nasal spray, 1 spray (50 mcg total) by Each Nostril route 2 (two) times daily., Disp: 11.1 mL, Rfl: 11    loratadine (CLARITIN) 10 mg tablet, Take 1 tablet (10 mg total) by mouth once daily. For urticaria, Disp: 30 tablet, Rfl: 11    Supplements: None    Food allergies  intolerances: NKFA    Labs:  Reviewed   Hemoglobin A1C   Date Value Ref Range Status   10/08/2021 5.5 4.0 - 5.6 % Final     " Comment:     ADA Screening Guidelines:  5.7-6.4%  Consistent with prediabetes  >or=6.5%  Consistent with diabetes    High levels of fetal hemoglobin interfere with the HbA1C  assay. Heterozygous hemoglobin variants (HbS, HgC, etc)do  not significantly interfere with this assay.   However, presence of multiple variants may affect accuracy.     08/13/2020 5.4 4.0 - 5.6 % Final     Comment:     ADA Screening Guidelines:  5.7-6.4%  Consistent with prediabetes  >or=6.5%  Consistent with diabetes  High levels of fetal hemoglobin interfere with the HbA1C  assay. Heterozygous hemoglobin variants (HbS, HgC, etc)do  not significantly interfere with this assay.   However, presence of multiple variants may affect accuracy.     07/17/2019 5.3 4.0 - 5.6 % Final     Comment:     ADA Screening Guidelines:  5.7-6.4%  Consistent with prediabetes  >or=6.5%  Consistent with diabetes  High levels of fetal hemoglobin interfere with the HbA1C  assay. Heterozygous hemoglobin variants (HbS, HgC, etc)do  not significantly interfere with this assay.   However, presence of multiple variants may affect accuracy.       Cholesterol   Date Value Ref Range Status   10/08/2021 205 (H) 120 - 199 mg/dL Final     Comment:     The National Cholesterol Education Program (NCEP) has set the  following guidelines (reference ranges) for Cholesterol:  Optimal.....................<200 mg/dL  Borderline High.............200-239 mg/dL  High........................> or = 240 mg/dL     08/13/2020 187 120 - 199 mg/dL Final     Comment:     The National Cholesterol Education Program (NCEP) has set the  following guidelines (reference ranges) for Cholesterol:  Optimal.....................<200 mg/dL  Borderline High.............200-239 mg/dL  High........................> or = 240 mg/dL     07/17/2019 171 120 - 199 mg/dL Final     Comment:     The National Cholesterol Education Program (NCEP) has set the  following guidelines (reference ranges) for  Cholesterol:  Optimal.....................<200 mg/dL  Borderline High.............200-239 mg/dL  High........................> or = 240 mg/dL       Triglycerides   Date Value Ref Range Status   10/08/2021 62 30 - 150 mg/dL Final     Comment:     The National Cholesterol Education Program (NCEP) has set the  following guidelines (reference values) for triglycerides:  Normal......................<150 mg/dL  Borderline High.............150-199 mg/dL  High........................200-499 mg/dL     08/13/2020 86 30 - 150 mg/dL Final     Comment:     The National Cholesterol Education Program (NCEP) has set the  following guidelines (reference values) for triglycerides:  Normal......................<150 mg/dL  Borderline High.............150-199 mg/dL  High........................200-499 mg/dL     07/17/2019 55 30 - 150 mg/dL Final     Comment:     The National Cholesterol Education Program (NCEP) has set the  following guidelines (reference values) for triglycerides:  Normal......................<150 mg/dL  Borderline High.............150-199 mg/dL  High........................200-499 mg/dL       HDL   Date Value Ref Range Status   10/08/2021 65 40 - 75 mg/dL Final     Comment:     The National Cholesterol Education Program (NCEP) has set the  following guidelines (reference values) for HDL Cholesterol:  Low...............<40 mg/dL  Optimal...........>60 mg/dL     08/13/2020 51 40 - 75 mg/dL Final     Comment:     The National Cholesterol Education Program (NCEP) has set the  following guidelines (reference values) for HDL Cholesterol:  Low...............<40 mg/dL  Optimal...........>60 mg/dL     07/17/2019 55 40 - 75 mg/dL Final     Comment:     The National Cholesterol Education Program (NCEP) has set the  following guidelines (reference values) for HDL Cholesterol:  Low...............<40 mg/dL  Optimal...........>60 mg/dL       LDL Cholesterol   Date Value Ref Range Status   10/08/2021 127.6 63.0 - 159.0 mg/dL Final      Comment:     The National Cholesterol Education Program (NCEP) has set the  following guidelines (reference values) for LDL Cholesterol:  Optimal.......................<130 mg/dL  Borderline High...............130-159 mg/dL  High..........................160-189 mg/dL  Very High.....................>190 mg/dL     08/13/2020 118.8 63.0 - 159.0 mg/dL Final     Comment:     The National Cholesterol Education Program (NCEP) has set the  following guidelines (reference values) for LDL Cholesterol:  Optimal.......................<130 mg/dL  Borderline High...............130-159 mg/dL  High..........................160-189 mg/dL  Very High.....................>190 mg/dL     07/17/2019 105.0 63.0 - 159.0 mg/dL Final     Comment:     The National Cholesterol Education Program (NCEP) has set the  following guidelines (reference values) for LDL Cholesterol:  Optimal.......................<130 mg/dL  Borderline High...............130-159 mg/dL  High..........................160-189 mg/dL  Very High.....................>190 mg/dL       HDL/Cholesterol Ratio   Date Value Ref Range Status   10/08/2021 31.7 20.0 - 50.0 % Final   08/13/2020 27.3 20.0 - 50.0 % Final   07/17/2019 32.2 20.0 - 50.0 % Final     Non-HDL Cholesterol   Date Value Ref Range Status   10/08/2021 140 mg/dL Final     Comment:     Risk category and Non-HDL cholesterol goals:  Coronary heart disease (CHD)or equivalent (10-year risk of CHD >20%):  Non-HDL cholesterol goal     <130 mg/dL  Two or more CHD risk factors and 10-year risk of CHD <= 20%:  Non-HDL cholesterol goal     <160 mg/dL  0 to 1 CHD risk factor:  Non-HDL cholesterol goal     <190 mg/dL     08/13/2020 136 mg/dL Final     Comment:     Risk category and Non-HDL cholesterol goals:  Coronary heart disease (CHD)or equivalent (10-year risk of CHD >20%):  Non-HDL cholesterol goal     <130 mg/dL  Two or more CHD risk factors and 10-year risk of CHD <= 20%:  Non-HDL cholesterol goal     <160 mg/dL  0 to 1  CHD risk factor:  Non-HDL cholesterol goal     <190 mg/dL     07/17/2019 116 mg/dL Final     Comment:     Risk category and Non-HDL cholesterol goals:  Coronary heart disease (CHD)or equivalent (10-year risk of CHD >20%):  Non-HDL cholesterol goal     <130 mg/dL  Two or more CHD risk factors and 10-year risk of CHD <= 20%:  Non-HDL cholesterol goal     <160 mg/dL  0 to 1 CHD risk factor:  Non-HDL cholesterol goal     <190 mg/dL       No results found for: [id number], [id number], [id number]  TSH   Date Value Ref Range Status   05/19/2022 2.186 0.400 - 4.000 uIU/mL Final         Typical day recall: Reviewed and noted during consultation. 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 She works 7 days on and 7 days off, pt has 2 kids a 16 year old and 4 year old. Pt tried to balance home and work life, but is only able to get 3 hours of sleep on her working days. Pt reported that her parents are moving soon and will be able to help care for the children. Pt reports having times of stress eating when it is a very busy night leaning on more carb heavy dishes. Pt reports gas pain in her diaphragm-- recommended having a food and symptom log to help pinpoint the food that is possibly causing the discomfort. Reviewed optimal meal pattern and gave recommendations for different food options for meals and snacks.     Beverages: 3 bottles water, 1 cup coffee    Dining out: Regular, 2-3 times per week    Alcohol: nonsmoker, no alcohol    Lifestyle Influences  Support System: Self, family, coworkers    Meal preparation/shopping: self    Current Activity Level: Pt is moving around with her kids throughout the day    Patient motivation, anticipated barriers, expected compliance: Patient is motivated and has verbalized understanding and intent to comply    DIAGNOSIS   Problem: weight gain  Etiology: RT stress eating and lack of sleep  Signs/Symptoms: AEB 20 lbs weight gain over 3 years    INTERVENTION  Nutrition  prescription: estimated energy requirements:   Calories: 7740-3126  Protein:  g  Carbohydrates: 110-120 g  Focus on plant-based, heart healthy fats  Total Fat: 40-50 g  Baseline for fluids: 70 fl ounces    Recommendations & Goals:  Patient goals and recommendations are tailored to the specific patient's needs, readiness to change, lifestyle, culture, skills, resources, & abilities. Strategies to help achieve these nutrition-related goals were discussed which can include but are not limited to SMART goal setting & mindful eating.     Aim for a minimum of 7 hours sleep   Exercise 60 minutes most days  Eat breakfast within 1-2 hours of waking up  Try not to skip any meals or snacks, not going more than 3-4 hours without eating.   At each meal and snack, try to include a source of fiber + lean protein + healthy fat.     Written Materials Provided  These resources are intended to assist the patient in making it easier to choose recommended options when eating out & to identify better-for-you brands at the grocery store:     Meal Planning Guide with recommendations discussed along with portion sizes and a customized meal plan    Eat Fit carmen card as a reminder to download the carmen to ones smart phone which provides: current Eat Fit partners, approved menu options, food labels for carb counting, & recipes    On-the-Go Food Guide   Brand Specific [employer] Product list    RD contact information- for patient to contact regarding any questions, needs, and/or concerns that may arise     MONITORING & EVALUATION    Communicated with healthcare provider    Documented plan for referral to appropriate agency/healthcare provider as needed    Comprehension: good     Motivation to change: high    Follow-up: annually, or PRN    Counseling time: 1 Hour

## 2022-05-30 NOTE — PATIENT INSTRUCTIONS
Name:  Evelia Mishra  Date:  5/30/2022      Recommended Daily Energy Requirements:   6441-5384 calories    grams  protein   110-120 grams carbohydrates   40-50 grams fat  Focus on plant-based fats  70 fluid oz per day    No more than 20 grams added sugar per day      Nutrition Tips & Goals:     Aim to eat or drink within 1-2 hours of waking; especially to contain a protein source  Frequent fueling: Aim for small meal or snack every 3 or so hours   Metabolism, energy, curb cravings  Emphasis on lean protein + fiber-rich carb (veg, fruit or whole grain) + plant-based fat   If/when choose grains and starches, choose 100% whole grains + fiber-rich starches such as legumes, quinoa, whole wheat pasta, sweet potatoes, 100% whole grain bread, etc.   Nutrition bars + snacks: Look for products with <7 grams added sugar and 7+ grams protein (Think Lorenzo #7 for aisle products)    Note: One serving (15-20 grams carbs)  =  1 cup fruit, cow's milk or plain yogurt,   ½ cup cooked grains, ½ cup starchy veggies (corn, peas, potatoes), 1 slice bread      Hydration: Drink at least ½ of your body weight in ounces of fluids daily + addition 16-24 ounces per pound of sweat lost via exercise.  [Green & Pleasant carmen for water reminder]  Exercise: Aim for 60 minutes most days or aim to obtain 10,000 steps per day throughout work day - ideally starting day with 30-45 minutes of exercise  Aim for a minimum of 7-8 hours sleep nightly  Keep a daily food record       Restaurant Tips:        Pick 2 out of the 5 Rule:  Instead of eating bread (or tortilla chips), an appetizer, alcoholic drink and dessert, choose just one to have with your entrée  Focus on lean proteins: Refer to lean protein page in meal plan guidebook      Select items grilled, baked, broiled, braised, poached or roasted      Avoid crispy, crunchy, breaded, paneed or stuffed items      Avoid items that are cream based, au gratin or buttered      Order  sauces, dressings and gravies on the side. This way you can add 1-2 Tbsp yourself  instead of the dish being 'drowned' in the sauce  = portion control + saving calories while still enjoying the dish      Watch out for high calorie salad additives à   A better-for-you salad consists of unlimited non-starchy veggies, lean protein and 2-3 salad additions, each additive being ~2 Tbsp (See below in notes for examples)       Hold the starchy side dish - request extra non-starchy vegetables instead      Order vegetables steamed or stir-fried instead of sautéed to avoid excess oils. Ask for olive oil on the side and drizzle over veggies instead      Don't drink your calories: avoid sugary soft drinks, juices and mixers  Note: even 100% fruit juice contains the same sugar as a soft drink     [facility] Eat Fit ILENE à Designed to take the guesswork out of dining out healthfully, to make the healthy choice the easy choice  www.eatfitnola.com  Eat Fit Cookbook  [facility] Eat Fit carmen à Free carmen for [employer]  [employer] and [employer] offer Eat Fit options  Provides a list of all Eat Fit restaurants & dishes by location  Lists what dishes are Eat Fit at each restaurant  Lists the nutrition facts for each Eat Fit dish  Provides 200 + Eat Fit approved recipes  Grocery shopping guides + community wellness resources    -Salad additives: Pick 2-3, each being ~2 Tbsp:  Dressing  Cheese  Nuts  Del Rosario  Dried fruit  Avocado  Guacamole  Eggs    How to make a healthy smoothie:  Choose a protein source:  At least 6 oz Plain Greek yogurt or 2% cottage cheese  Examples of plant-based protein powders:  Matt Carlosga  Garden of Life RAW  100% whey protein powder  2 scoops Vital Proteins Collagen Peptides  Add a piece of fruit -or- 1 cup chopped fresh or frozen unsweetened fruit  Add any non-starchy veggies  Choose a fat source (1-2 Tbsp)  Nut Butter (except Nutella)  ½ Avocado   Avocado oil   Extra Virgin Olive Oil  Choose a  liquid:  Unsweetened almond milk  hemp milk  cashew milk  coconut milk  [facility] lactose free milk (skim, 1% or 2%) -or- Organic Valley ULTRA reduced fat milk (lactose free)  Water   Healthy add-ins for extra nutritional boost:  1-2 Tbsp Flaxseeds or Heraclio seeds  Add ice and blend!   To step it up a notch, use frozen PLAIN cauliflower florets in place of ice to provide more nutrients    What may cause inflammation/flare ups in our body/decrease our Energy?  White/refined carbohydrates  Sugar  Fried food  Alcohol      Sample Meal Plan:    Breakfast: 1-2 servings of carbs = 30-40 grams + at least 15 grams lean protein/heart healthy fat  1 slice 100% whole grain bread (Ex: Tucker)+ ½  small avocado + 1 egg  1 slice 100% whole grain bread + 1-2 Tbsp PB/nut butter  100% whole wheat English muffin + 2 eggs with a sprinkle of cheese on top   ½ to 1 cup plain cooked oatmeal + 1-2 Tbsp PB stirred in + 1 Tbsp flaxseed  1 packet weight control oatmeal + ½ -1 scoop protein powder  [employer] [place] GF Oatmeal with Flax & Heraclio (grab and go oatmeal cup)  Evol frozen breakfast sandwich  Omelet: 2 eggs + sprinkle of cheese + ¼- ½ avocado + non-starchy veggies + fruit  High protein, fiber rich cereals: Nutritious living hi-lo, kasha go lean, Natures path optimum, special K protein    Snack: 1 serving of carbs = 15-20 grams + at least 10-15 grams lean protein/heart healthy fat  Needed if going >3-4 hours between meals (See below snack for options)    Lunch: 1-2 servings of carbs =20-40 grams = ½ cup to 1 cup cooked starch + palm size thickness lean protein + non-starchy veggies  See picture below in notes as a guide  Refer to meal plan guide book for list of lean proteins, whole grain carbohydrates and non-starchy veggies      Snack: 1 serving of carbs = 15-20 grams + at least 10-15 grams lean protein/heart healthy fat  Fruit of choice (1 piece or size of a tennis ball, i.e. orange, pear, peach, etc or 1 cup melon/berries) +  protein/healthy fat:  Nuts (~ ¼ cup)  Nut butter (1-2 Tablespoons)  Cheese (reduced fat, light, part-skim, 2% cheese options)  Jerky (see grocery list for recommended brands)  hard boiled egg/s (1 yolk)   Veggies + ½ cup chicken or tuna salad  Flavored Greek Yogurt (no added sugar): Dannon oikos triple zero, Chobani Less Sugar, Two Good,   [place] unsweetened no sugar added 10 gram protein yogurt (plant based yogurt option)  Plain Greek yogurt + Truvia or Swerve (for sweetness) + flavor ( ¾ cup fruit, 2 Tbsp granola, ¼ - ½ cup Kashi Go Lean, extracts, etc)  Protein bar (less than 7 grams added sugar; 10-20 grams protein; ~ 150-200 calories)  Nature Valley Protein Chewy Bar  Powercrunch  Oatmega  Rx  Quest  Kind PROTEIN  EPIC bar  Ready to Drink Protein Drink (less than 7 grams sugar + 20-30 grams protein)   Iconic  Premier  Orgain  1 serving of Beanito chips + 1, 100-calorie wholly guacamole packet or 1/4 cup shredded cheese  [place]: 1-2 slices 100% whole grain bread + smear of zapata + 3 oz lean protein (refer to meal plan guide book)  [place] balance break  Any breakfast can be a snack    Dinner: Limit carbs for dinner  Aim for palm size/thickness lean protein + at least 1 cup non-starchy veggies + small amount of heart healthy fats  Kabobs, stuffed bell peppers with no rice, Cauliflower 'rice' stir montes de oca  Carb swaps:  House Foods Tofu Shirataki noodles (found in produce section of grocery stores)  Spaghetti Squash  Hearts of palm 'noodles'  Cauliflower rice  Broccoli Rice  Zoodles  Beet Zoodles  Crepini mini egg white thins (protein egg wrap)                   PRODUCE  [] All fresh fruit   [] All fresh vegetables   [] All fresh herbs  [] All herb purees + pastes  [] Pre-spiralized vegetable noodles   [] Steam-In-The-Bag begetables  [] Riced cauliflower  [] Jicama sticks  [] Love Beets  all varieties  [] Wholly Guacamole  all varieties  [] Hummus  all varieties, chickpea + vegetable  [] Sunni Velarde  "noodles    [] Tofu  all varieties  [] Tempeh  all varieties    PROTEIN  CHICKEN   [] Boneless, skinless breasts  [] Boneless, skinless thighs  [] Ground chicken breast, at least 93% lean  [] Chicken breast cutlet  [] Aidell's  Chicken Sausage + Chicken Meatballs    TURKEY   [] Turkey breast tenderloin   [] Ground turkey breast, at least 93% lean  [] Jenny Naturals  Turkey Sausage    BEEF  [] Tenderloin  [] Sirloin  [] Top Loin  [] Flank Steak  [] Round Steak  [] Filet  [] Lean ground beef, at least 93% lean + grass-fed preferable    PORK  [] Tenderloin  [] Pork Chop  [] [place]  [] [place] Naturals  No-Sugar Del Rosario    BISON  [] [place]  90 - 95% lean    SEAFOOD  [] All fresh fish + seafood; locally sourced when possible  [] Smoked salmon    HEAT + EAT ENTREES   [] Pravin's Natural Foods  Chicken, Pork, Beef  [] Andrea  "All Natural" Grilled Chicken Breast + Strips, all varieties    SAUCES SPREADS + DIPS  [] Bitchin Sauce  Original, Chipotle, Cilantro [place]  [] Yonathan's Kitchen  Tzatziki Yogurt Dip, Babaganoush, Hummus  [] Wholly Guacamole  all varieties  [] Hummus  all varieties  [] Oxly Gringo Salsa  all varieties  [] Mrs. Betty's Salsa  all varieties  [] Stubb's All Natural BBQ Sauce  [] Primal Kitchen  Hills, Ketchup, BBQ Sauce  [] Primal Kitchen Pasta Sauce  Roasted Garlic, Tomato Basil, no-dairy Vodka Sauce  [] Sal & Katiana's  HeartSmart Pasta Sauce    DAIRY/DAIRY SUBSTITUTES/EGGS  EGGS   [] All eggs  cage-free, pasture-raise preferable  [] Crepini  egg wraps  [] Vital Farms  Pasture-Raised Egg Bites  [] JUST Egg [vegan]     CREAMERS   [] Califia  Better Half, original + vanilla unsweetened  [] NutPods  all varieties    MILK   [] Horizon Organic  all varieties except chocolate  [] Organic Valley  all varieties except chocolate  [] Organic Valley  ultra-filtered, reduced fat milk     PLANT_BASED MILK ALTERNATIVES  [] All unsweetened almond milks  original, vanilla + " chocolate  [] Ripple  unsweetened   [] Milkadamia  original +_ vanilla, unsweetened   [] Forager  original + vanilla, unsweetened   [] Silk Organic  soy milk, unsweetened  [] Oatly  unsweetened  [] Califia  regular + protein-fortified oat milk, unsweetened     CHEESES  [] Regular or reduced fat cheeses  [] BelGioso  Fresh Mozzarella Snack Packs, Parmesan Power-full Snack   [] Goat cheese  [] Fresh mozzarella  [] String cheese  all varieties  [] Nellie Cottage Cheese  [] Tasha's Cultured Cottage Cheese  [] Mamie Life 'Just Like Mozzarella'  plant-based shreds and other varieties  [] Parmela Creamery  plant-based shredded cheese    YOGURT  [] Fage  2% low-fat, plain  [] Siggi's  plain, vanilla  [] Chobani Greek  nonfat + whole milk yogurt, plain   [] Chobani Less Sugar  all flavored varieties   [] Oikos Greek  nonfat, plain  [] Two Good  all varieties   [] [facility]  plain  [] [place] Organic  low-fat + nonfat, plain  [] [place] Farm  goat milk yogurt, plain  [] Kefit  unsweetened, plain  [] Forager  Greek style unsweetened, plain [dairy-free]  [] [place]  unsweetened Greek style, plain [dairy-free]  [] [facility]  almond milk yogurt, vanilla or plain, unsweetened [dairy-free]    FREEZER SECTION  FROZEN VEGGIES  [] All plain frozen veggies + greens [e.g. broccoli, brussels, carrots, okra, mushrooms, zucchini, yellow squash, butternut squash, kale, spinach, fab greens]  [] Riced veggies [e.g. cauliflower, broccoli, butternut squash]  [] Edamame  all varieties  [] Green Giant  [] Veggie Spirals  [] Marinated Veggies [e.g. eggplant, peppers, zucchini]  [] Simply Steam [place] Sprouts  [] Birds Eye  [] Power Blend Italian Style  lentils, broccoli, kale, zucchini  []  Shiloh Sprouts & Carrots  [] Oven Roasters Broccoli & Cauliflower  [] [place] Blend  [] Tattooed   [] Green Bean Blend  [] Farmer's Market Ratatouille  [] Butter Balsamic Glazed Vegetables  []  Riced Cauliflower & Quinoa Mediterranean Style  [] Izabela's Good Life  Southern Style Greens [sauteed kale + onion]    FROZEN FRUITS  [] All unsweetened frozen fruits  all varieties  [] Dole Fruits & Veggie Blends  Berries 'n Kale  [] Dole Mix-ins  Triple Berry     FROZEN ENTREES  [] The Good Kitchen meals  all varieties [ e.g. Chili Lime Chicken Over Riced Cauliflower]  [] Premium Paleo  Not Mainor Momma's Meatloaf  [] Primal Kitchen  Chicken Pesto + Steak Fajitas w/ Peppers & Onions  [] Eating Well Frozen Entrees  Butter Chicken Masala, Steak Carne Asada, Creamy Pesto Chicken, Chicken + Wild Rice Stroganoff, Yellow Simon Chicken, Sun-dried Tomato Chicken, Chicken Lo Mein  [] Realgood Entree Bowls  Polish Inspired Beef Bowl over Riced Cauliflower, Chicken Burrito Bowl   [] Great Karma Coconut Simon  [] Barbara's  Tamale Mikael with Black Beans, Vegetable Lasagna  [] Kashi Mayan [place] Bake  [] Healthy Choice  Simply Steamers Chicken Fried Rice  [] Basil Pesto Chicken & Mikhail Style Pork Power Bowls  [] Tattooed   Enchilada Bowl  [] Judith Farms  Spicy Black Bean Burgers    FROZEN PIZZAS  [] Cauli'flour Foods  Cauliflower Pizza Crusts  [] Outer Aisle  Cauliflower Crust  [] Barbara's  Veggie Crust Cheese Pizza  [] Quest Pizza     VEGETARIAN PRODUCTS  [] Beyond Meat  ground 'meat' + grilled 'chicken' strips  [] Tofurkey  Original Italian Sausage + Original Tempeh  [] Gardein  Beefless Ground + Meatless Meatballs  [] Judith Farms Grillers  Original Burger, Crumbles, Meatballs    ICE CREAMS + FROZEN DESSERTS  [] Halo Top  regular + keto series, pops  [] Rebel  ice cream + dessert sandwiches  [] Enlightened  ice cream + bars  [] Nightfood  ice cream  [] Realgood  ice cream  [] Arctic Zero Fit  frozen pint  [] The Frozen Farmer  sorbets  [] Wholly Rollies  Protein Balls, all varieties  [] Dream Pops  Coconut Latte    FROZEN BREAKFASTS  [] Realgood  Breakfast Sandwiches on Cauliflower  Cheesy Bread  [] Rebel  ice cream + dessert sandwiches  [] Enlightened  ice cream + bars  [] Nightfood  ice cream  [] Realgood  ice cream  [] Arctic Zero Fit  frozen pint  [] The Frozen Farmer  sorbets  [] Wholly Rollies  Protein Balls, all varieties  [] Dream Pops  Coconut Latte    BREADS/BUNS/WRAPS  [] Tucker Bread: All Types - In Freezer Section   [] Flat Out Light Wraps - All Varieties   [] Flat Out Protein Up Carb Down Flat Bread   [] Kontos Whole Wheat Pocket Aditi   [] Andre ILENE 100% Whole Wheat Tortillas   [] LaTortilla Factory Tortillas - Smart & Delicious; 50 or 80-calorie   [] Nature's Own 100% Whole Wheat Bread   [] Orowheat Healthful - 100% Whole Wheat Slice Bread and [place] Thins   [] Orowheat Healthful - Whole Wheat Nuts & Grain Bread; Flax & Seed Bread   [] Pepperidge Farm Natural Whole Grain 15 Bread   [] Pepperidge Farm Natural Whole Grain English Muffin - 100% Whole Wheat   [] Pepperidge Farm Very Thin 100% Whole Wheat   [] Sweta Dorsey 45 Calories and Delightful   [] Francois' 100% Whole Wheat Thin-Sliced Bagels and English Muffins   [] Western Bagel: Perfect 10     GLUTEN FREE  [] Yadiel's Gluten Free Bread   [] [place] Bakehouse 7-Grain Gluten Free Bread     LEGUME PASTA   [] Explore Asian Organic Black Bean Spaghetti   [] Modern Table   [] Tolerant Foods       NUT BUTTERS & JELLIES    NUT BUTTERS   [] Better'n Chocolate: Coconut Chocolate Peanut Butter Spread   [] Better'n Peanut Butter - All Types   [] Earth Balance Coconut and Peanut Spread   [] Dhaval's Nut Vidor   [] MaraNatha: All Natural Roasted Cashew Butter - [place] or Creamy   [] MaraNatha: Roasted Peanut Butter   [] Nuts 'N More Peanut Vidor - All Flavors   [] PB2 Powder - Original or Chocolate   [] Skippy Natural - Creamy, Super Chunk   [] Smart Balance Peanut Butter - [place] or Creamy   [] Peanut Butter & Company:   [] Smooth , Crunch Time, The Heat Is On, Old Fashioned Smooth, Mighty Nut- Powdered Peanut Butter,  Squeeze Pack   [] Smucker's Natural Peanut Butter - [place] or Creamy   [] Sunbutter Nut Butter   [] Wild Friends Protein Peanut Butter/Edgar o Butter - Vanilla or Chocolate     JELLIES  o Polaner's All Fruit   o Clearly Organic Best Choice: Strawberry Fruit Spread       SNACKS    BARS  [] Kashi Bars - Chewy or Crunchy; Honey Edgar o Flax or Peanut Butter   [] KIND Bars - 5 Grams of Sugar or Less   [] KIND Protein Bars - Strong and KIND   [] Nature Valley Protein Bar - All Varieties   [] Nature Valley Roasted Nut Crunch - Edgar Crunch; Peanut Crunch   [] [facility] Simple Nut Bar - Roasted Peanut & Honey   [] [facility] Simple Nut Bar - Edgar, Cashew & Sea Salt   [] [facility] Nut [place] Bar - Salted Caramel Peanut   [] Think Thin Protein Bars   [] Quest Bars, Power Crunch Bars, Pure Protein Bars     BEEF JERKY - NITRATE FREE   [] Game On   [] Grass Run Farms   [] Krave   [] Ostrim   [] Perky Jerky   [] Primal Strips Meatless Vegan Jerky   [] [place]     CHIPS   [] Beanitos Chips   [] Fruit Crisps - e.g. Brother's-All-Natural, Bare Fruit, Yoga Chips   [] Gill's Soy Crisps: 1.3 ounce bag   [] Quest Protein Chips   [] [place]a Whole Wheat Crisp Bread     CRACKERS  [] Ramya's Gone Crackers   [] Nabisco Triscuit: Regular and Thin Crisp Crackers   [] Vans Say Cheese Crackers (G-F)     POPCORN/NUTS   [] Jesus Reid's Smart Pop Popcorn - Single Serving   [] 100-Calorie Pack of Nuts - All Varieties     PROTEIN POWDERS & DRINKS  []  Protein -  Whey Protein Powder   [] Garden of Life Raw Protein Powder   [] Iconic Ready-To-Drink Protein Drink   [] Guerrero One Protein Powder   [] VegaSport Protein Powder     SALSA/HUMMUS/DIPS   [] Eat Well Embrace Life: Zestjarvis Chairezrabj Carrot o Hummus   [] Pre-Portioned Guacamole Packs   [] Vel's   [] Tostitos Restaurant Style Salsa       SOUPS   [] Barbara's Organic Soups - Lentil, Vegetable, Split Pea, Low-Sodium     CANNED GOODS   [] 100% Pure Pumpkin   []  BlueRunner Creole Cream-Style Red Beans or Navy Beans   [] Cajun Power Chicken Gumbo Base   [] Chicken of the Sea Encantado [place]   [] Elise Fresh Cut Sliced Beets   [] Hormel Breast of Chicken in Water   [] LeSuer Tender Baby Whole Carrots   [] Fozialcedric Tabasco [place] Starter   [] StarKist: Chunk Lite Tuna in Water, Gourmet Select Pouches   [] StarKist: Yellow[place] Tuna Fillets   [] Trappey's: Kidney, Butter, Heller, Black Eye, Field, and Black Beans   [] AB Thomas's Turnip Greens or Rodrick Spinach     CONDIMENTS/ SAUCES/SPREADS/ SPICES  [] Yaw Apodaca's Magic Seasonings - Regular or Salt Free   [] Carlyle Mckay's Sauces - All Flavors   [] Laughing Cow Light - All Flavors   [] Dash Salt-Free Marinade - All Flavors   [] Bandar & Katiana's: Heart Smart Pasta Sauces   [] Tabasco     SALAD DRESSINGS  [] Sharyn's Naturals: Lite Honey Mustard   [] Jamari's Own: Lighten Up Salad Dressing - All Varieties   [] OPA Greek Yogurt Dressings - Ranch, Blue o Cheese, Caesar, Feta Dill     SWEETENERS  [] Sweet [place] Sweetener   [] Swerve   [] Truvia     BEVERAGES  [] Coconut Water   [] Crystal Light PURE - All Flavors   [] Honest Tea: Just Green Tea, Unsweetened   [] Kombucha Tea   [] [place]   [] [facility] Bloody Ramya Mix   [] Metromint - Zero-Sugar; All Natural Flavored   [] Neris - Plain or Flavored   [] Spindriff [place]   [] Steaz - Zero-Sugar, All-Natural, Sparkling Tea   [] Tea Bags: Any Brand - e.g. Naga, Yogi, Tazzo, Celestial   [] V8 100% Vegetable Juice   [] Vitamin Water Zero   [] Water   [] Zevia - Stevia Sweetened Soft Drink     BEER/EDI/LIQUORS  []Masters's Premier Light 64 Calories   [] Bud Select - 55 Calories   [] [facility] Bloody Ramya Mix   [] Johnson Genuine Draft - 64 Calories   [] Red or White Wine - All Varieties     CEREALS: HOT/COLD   [] Maday's Puffin's Original Cereal  [] Nilda's Mill Oat Bran Hot Cereal - Cracked Wheat, Multi-Grain  [] Kashi GoLean Cereal  [] Kashi GoLean Hot Cereal packets  - Vanilla; Honey Cinnamon  [] Josephine's Special K Protein Cereal  [] Murguia's Steel Cut Citizen of [place] Oatmeal  [] Nature's Path Smart Bran  [] Lutheran Instant Oatmeal packet, Original  [] Lutheran Old Fashioned Lutheran Oats  [] Uncle Andre's Whole Wheat & Flaxseed Original Cereal

## 2022-05-31 ENCOUNTER — PATIENT MESSAGE (OUTPATIENT)
Dept: OTOLARYNGOLOGY | Facility: CLINIC | Age: 42
End: 2022-05-31
Payer: COMMERCIAL

## 2022-06-03 LAB
BASOPHILS %, CD203C: 15.2 % (ref 0–12)
IGE RECEPTOR AB INTERPRETATION:: ABNORMAL

## 2022-06-07 ENCOUNTER — PATIENT MESSAGE (OUTPATIENT)
Dept: ALLERGY | Facility: CLINIC | Age: 42
End: 2022-06-07
Payer: COMMERCIAL

## 2022-07-25 DIAGNOSIS — Z00.00 ROUTINE GENERAL MEDICAL EXAMINATION AT A HEALTH CARE FACILITY: Primary | ICD-10-CM

## 2022-07-28 ENCOUNTER — CLINICAL SUPPORT (OUTPATIENT)
Dept: INTERNAL MEDICINE | Facility: CLINIC | Age: 42
End: 2022-07-28
Payer: COMMERCIAL

## 2022-07-28 ENCOUNTER — OFFICE VISIT (OUTPATIENT)
Dept: INTERNAL MEDICINE | Facility: CLINIC | Age: 42
End: 2022-07-28
Payer: COMMERCIAL

## 2022-07-28 VITALS
HEIGHT: 62 IN | DIASTOLIC BLOOD PRESSURE: 70 MMHG | SYSTOLIC BLOOD PRESSURE: 107 MMHG | HEART RATE: 71 BPM | BODY MASS INDEX: 27.26 KG/M2 | WEIGHT: 148.13 LBS

## 2022-07-28 DIAGNOSIS — R79.89 ELEVATED TSH: ICD-10-CM

## 2022-07-28 DIAGNOSIS — Z86.19 HISTORY OF HELICOBACTER PYLORI INFECTION: ICD-10-CM

## 2022-07-28 DIAGNOSIS — Z00.00 ROUTINE GENERAL MEDICAL EXAMINATION AT A HEALTH CARE FACILITY: Primary | ICD-10-CM

## 2022-07-28 DIAGNOSIS — K21.9 HIATAL HERNIA WITH GERD: ICD-10-CM

## 2022-07-28 DIAGNOSIS — K44.9 HIATAL HERNIA WITH GERD: ICD-10-CM

## 2022-07-28 LAB
ALBUMIN SERPL BCP-MCNC: 3.9 G/DL (ref 3.5–5.2)
ALP SERPL-CCNC: 52 U/L (ref 55–135)
ALT SERPL W/O P-5'-P-CCNC: 15 U/L (ref 10–44)
ANION GAP SERPL CALC-SCNC: 8 MMOL/L (ref 8–16)
AST SERPL-CCNC: 15 U/L (ref 10–40)
BILIRUB SERPL-MCNC: 0.7 MG/DL (ref 0.1–1)
BUN SERPL-MCNC: 15 MG/DL (ref 6–20)
CALCIUM SERPL-MCNC: 9.1 MG/DL (ref 8.7–10.5)
CHLORIDE SERPL-SCNC: 106 MMOL/L (ref 95–110)
CHOLEST SERPL-MCNC: 196 MG/DL (ref 120–199)
CHOLEST/HDLC SERPL: 3.4 {RATIO} (ref 2–5)
CO2 SERPL-SCNC: 23 MMOL/L (ref 23–29)
CREAT SERPL-MCNC: 0.7 MG/DL (ref 0.5–1.4)
ERYTHROCYTE [DISTWIDTH] IN BLOOD BY AUTOMATED COUNT: 12.4 % (ref 11.5–14.5)
EST. GFR  (AFRICAN AMERICAN): >60 ML/MIN/1.73 M^2
EST. GFR  (NON AFRICAN AMERICAN): >60 ML/MIN/1.73 M^2
ESTIMATED AVG GLUCOSE: 108 MG/DL (ref 68–131)
GLUCOSE SERPL-MCNC: 98 MG/DL (ref 70–110)
HBA1C MFR BLD: 5.4 % (ref 4–5.6)
HCT VFR BLD AUTO: 37.7 % (ref 37–48.5)
HDLC SERPL-MCNC: 57 MG/DL (ref 40–75)
HDLC SERPL: 29.1 % (ref 20–50)
HGB BLD-MCNC: 12.6 G/DL (ref 12–16)
LDLC SERPL CALC-MCNC: 126.6 MG/DL (ref 63–159)
MCH RBC QN AUTO: 30.8 PG (ref 27–31)
MCHC RBC AUTO-ENTMCNC: 33.4 G/DL (ref 32–36)
MCV RBC AUTO: 92 FL (ref 82–98)
NONHDLC SERPL-MCNC: 139 MG/DL
PLATELET # BLD AUTO: 232 K/UL (ref 150–450)
PMV BLD AUTO: 9.8 FL (ref 9.2–12.9)
POTASSIUM SERPL-SCNC: 4.1 MMOL/L (ref 3.5–5.1)
PROT SERPL-MCNC: 7.2 G/DL (ref 6–8.4)
RBC # BLD AUTO: 4.09 M/UL (ref 4–5.4)
SODIUM SERPL-SCNC: 137 MMOL/L (ref 136–145)
T4 FREE SERPL-MCNC: 0.96 NG/DL (ref 0.71–1.51)
TRIGL SERPL-MCNC: 62 MG/DL (ref 30–150)
TSH SERPL DL<=0.005 MIU/L-ACNC: 5.29 UIU/ML (ref 0.4–4)
WBC # BLD AUTO: 5.1 K/UL (ref 3.9–12.7)

## 2022-07-28 PROCEDURE — 3044F HG A1C LEVEL LT 7.0%: CPT | Mod: CPTII,S$GLB,, | Performed by: INTERNAL MEDICINE

## 2022-07-28 PROCEDURE — 3074F SYST BP LT 130 MM HG: CPT | Mod: CPTII,S$GLB,, | Performed by: INTERNAL MEDICINE

## 2022-07-28 PROCEDURE — 83036 HEMOGLOBIN GLYCOSYLATED A1C: CPT | Performed by: INTERNAL MEDICINE

## 2022-07-28 PROCEDURE — 1159F PR MEDICATION LIST DOCUMENTED IN MEDICAL RECORD: ICD-10-PCS | Mod: CPTII,S$GLB,, | Performed by: INTERNAL MEDICINE

## 2022-07-28 PROCEDURE — 3008F BODY MASS INDEX DOCD: CPT | Mod: CPTII,S$GLB,, | Performed by: INTERNAL MEDICINE

## 2022-07-28 PROCEDURE — 99396 PR PREVENTIVE VISIT,EST,40-64: ICD-10-PCS | Mod: S$GLB,,, | Performed by: INTERNAL MEDICINE

## 2022-07-28 PROCEDURE — 99999 PR PBB SHADOW E&M-EST. PATIENT-LVL I: CPT | Mod: PBBFAC,,,

## 2022-07-28 PROCEDURE — 3044F PR MOST RECENT HEMOGLOBIN A1C LEVEL <7.0%: ICD-10-PCS | Mod: CPTII,S$GLB,, | Performed by: INTERNAL MEDICINE

## 2022-07-28 PROCEDURE — 99999 PR PBB SHADOW E&M-EST. PATIENT-LVL III: CPT | Mod: PBBFAC,,, | Performed by: INTERNAL MEDICINE

## 2022-07-28 PROCEDURE — 1160F PR REVIEW ALL MEDS BY PRESCRIBER/CLIN PHARMACIST DOCUMENTED: ICD-10-PCS | Mod: CPTII,S$GLB,, | Performed by: INTERNAL MEDICINE

## 2022-07-28 PROCEDURE — 99396 PREV VISIT EST AGE 40-64: CPT | Mod: S$GLB,,, | Performed by: INTERNAL MEDICINE

## 2022-07-28 PROCEDURE — 3008F PR BODY MASS INDEX (BMI) DOCUMENTED: ICD-10-PCS | Mod: CPTII,S$GLB,, | Performed by: INTERNAL MEDICINE

## 2022-07-28 PROCEDURE — 3078F PR MOST RECENT DIASTOLIC BLOOD PRESSURE < 80 MM HG: ICD-10-PCS | Mod: CPTII,S$GLB,, | Performed by: INTERNAL MEDICINE

## 2022-07-28 PROCEDURE — 84443 ASSAY THYROID STIM HORMONE: CPT | Performed by: INTERNAL MEDICINE

## 2022-07-28 PROCEDURE — 84439 ASSAY OF FREE THYROXINE: CPT | Performed by: INTERNAL MEDICINE

## 2022-07-28 PROCEDURE — 3078F DIAST BP <80 MM HG: CPT | Mod: CPTII,S$GLB,, | Performed by: INTERNAL MEDICINE

## 2022-07-28 PROCEDURE — 80053 COMPREHEN METABOLIC PANEL: CPT | Performed by: INTERNAL MEDICINE

## 2022-07-28 PROCEDURE — 99999 PR PBB SHADOW E&M-EST. PATIENT-LVL III: ICD-10-PCS | Mod: PBBFAC,,, | Performed by: INTERNAL MEDICINE

## 2022-07-28 PROCEDURE — 3074F PR MOST RECENT SYSTOLIC BLOOD PRESSURE < 130 MM HG: ICD-10-PCS | Mod: CPTII,S$GLB,, | Performed by: INTERNAL MEDICINE

## 2022-07-28 PROCEDURE — 1159F MED LIST DOCD IN RCRD: CPT | Mod: CPTII,S$GLB,, | Performed by: INTERNAL MEDICINE

## 2022-07-28 PROCEDURE — 85027 COMPLETE CBC AUTOMATED: CPT | Performed by: INTERNAL MEDICINE

## 2022-07-28 PROCEDURE — 99999 PR PBB SHADOW E&M-EST. PATIENT-LVL I: ICD-10-PCS | Mod: PBBFAC,,,

## 2022-07-28 PROCEDURE — 1160F RVW MEDS BY RX/DR IN RCRD: CPT | Mod: CPTII,S$GLB,, | Performed by: INTERNAL MEDICINE

## 2022-07-28 PROCEDURE — 80061 LIPID PANEL: CPT | Performed by: INTERNAL MEDICINE

## 2022-07-28 PROCEDURE — 36415 COLL VENOUS BLD VENIPUNCTURE: CPT | Performed by: INTERNAL MEDICINE

## 2022-07-28 RX ORDER — PANTOPRAZOLE SODIUM 20 MG/1
20 TABLET, DELAYED RELEASE ORAL DAILY
Qty: 90 TABLET | Refills: 1 | Status: SHIPPED | OUTPATIENT
Start: 2022-07-28 | End: 2023-07-28

## 2022-07-28 NOTE — PROGRESS NOTES
Subjective:       Patient ID: Evelia Mishra is a 41 y.o. female.    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  She has a past medical history significant for allergic rhinitis, H pylori treated back last year in July 21 and recheck with a stool H pylori negative antigen this May however she is having symptoms GERD a little more frequently lately.  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  She also has a history of a mild elevation in TSH with the last 1 being in August of 2020 and this year again it is mildly elevated.    Review of Systems   Constitutional: Negative for activity change, appetite change, chills, diaphoresis, fatigue, fever and unexpected weight change.   HENT: Negative for nasal congestion, ear pain, mouth sores, postnasal drip, sinus pressure/congestion, sore throat and trouble swallowing.    Eyes: Negative for pain, redness and visual disturbance.   Respiratory: Negative for apnea, cough, chest tightness, shortness of breath and wheezing.    Cardiovascular: Negative for chest pain, palpitations and leg swelling.   Gastrointestinal: Positive for reflux. Negative for abdominal distention, abdominal pain, blood in stool, constipation, diarrhea, nausea and vomiting.   Endocrine: Negative for cold intolerance, polydipsia, polyphagia and polyuria.   Genitourinary: Negative for difficulty urinating, dysuria, flank pain, frequency, hematuria, menstrual problem, pelvic pain and urgency.   Musculoskeletal: Negative for arthralgias, back pain, joint swelling and neck pain.   Integumentary:  Negative for color change, rash and wound.   Neurological: Negative for dizziness, tremors, seizures, syncope, weakness, light-headedness, numbness and headaches.   Hematological: Negative for adenopathy. Does not bruise/bleed easily.    Psychiatric/Behavioral: Negative for confusion, decreased concentration, dysphoric mood, hallucinations, self-injury, sleep disturbance and suicidal ideas. The patient is not nervous/anxious.          Objective:      Physical Exam  Constitutional:       Appearance: Normal appearance.   HENT:      Head: Normocephalic and atraumatic.   Neck:      Thyroid: No thyromegaly.      Vascular: No carotid bruit.   Cardiovascular:      Rate and Rhythm: Normal rate and regular rhythm.      Heart sounds: No murmur heard.  Pulmonary:      Effort: Pulmonary effort is normal.      Breath sounds: Normal breath sounds. No rales.   Abdominal:      General: Bowel sounds are normal. There is no distension.      Palpations: Abdomen is soft.      Tenderness: There is no abdominal tenderness.   Musculoskeletal:         General: No tenderness.      Cervical back: Normal range of motion and neck supple.   Neurological:      Mental Status: She is alert and oriented to person, place, and time.   Psychiatric:         Mood and Affect: Mood normal.         Behavior: Behavior normal.         Thought Content: Thought content normal.         Judgment: Judgment normal.         Lab Results   Component Value Date    WBC 5.10 07/28/2022    HGB 12.6 07/28/2022    HCT 37.7 07/28/2022     07/28/2022    CHOL 196 07/28/2022    TRIG 62 07/28/2022    HDL 57 07/28/2022    ALT 15 07/28/2022    AST 15 07/28/2022     07/28/2022    K 4.1 07/28/2022     07/28/2022    CREATININE 0.7 07/28/2022    BUN 15 07/28/2022    CO2 23 07/28/2022    TSH 5.293 (H) 07/28/2022    HGBA1C 5.4 07/28/2022     Assessment:       Routine general medical examination at a health care facility  -     Comprehensive metabolic panel  -     CBC Without Differential  -     Lipid panel  -     TSH  -     Hemoglobin A1c    Elevated TSH  Comments:  elevated a bit more this year  would consider low dose thyroid supplement     Hiatal hernia with GERD--- EGD in 7/2021    Comments:  symptomatic at present     History of Helicobacter pylori infection  Comments:  treated 7/2022 and H pylori recheck stool AG was negative in 5/2022    Other orders  -     T4, Free      Health Maintenance   Topic Date Due    Mammogram  10/08/2022    TETANUS VACCINE  10/31/2027    Hepatitis C Screening  Completed    Lipid Panel  Completed     Health Maintenance Reviewed

## 2022-07-29 ENCOUNTER — PATIENT MESSAGE (OUTPATIENT)
Dept: INTERNAL MEDICINE | Facility: CLINIC | Age: 42
End: 2022-07-29
Payer: COMMERCIAL

## 2022-08-01 DIAGNOSIS — R79.89 ELEVATED TSH: Primary | ICD-10-CM

## 2022-11-16 ENCOUNTER — PATIENT MESSAGE (OUTPATIENT)
Dept: INTERNAL MEDICINE | Facility: CLINIC | Age: 42
End: 2022-11-16
Payer: COMMERCIAL

## 2023-04-12 ENCOUNTER — PATIENT MESSAGE (OUTPATIENT)
Dept: ALLERGY | Facility: CLINIC | Age: 43
End: 2023-04-12
Payer: COMMERCIAL

## 2023-05-23 DIAGNOSIS — Z00.00 ROUTINE MEDICAL EXAM: Primary | ICD-10-CM

## 2023-05-24 ENCOUNTER — TELEPHONE (OUTPATIENT)
Dept: INFECTIOUS DISEASES | Facility: CLINIC | Age: 43
End: 2023-05-24
Payer: COMMERCIAL

## 2023-06-22 ENCOUNTER — PATIENT MESSAGE (OUTPATIENT)
Dept: INFECTIOUS DISEASES | Facility: CLINIC | Age: 43
End: 2023-06-22
Payer: COMMERCIAL

## 2023-06-23 ENCOUNTER — CLINICAL SUPPORT (OUTPATIENT)
Dept: INFECTIOUS DISEASES | Facility: CLINIC | Age: 43
End: 2023-06-23
Payer: COMMERCIAL

## 2023-06-23 ENCOUNTER — CLINICAL SUPPORT (OUTPATIENT)
Dept: INTERNAL MEDICINE | Facility: CLINIC | Age: 43
End: 2023-06-23
Payer: COMMERCIAL

## 2023-06-23 ENCOUNTER — OFFICE VISIT (OUTPATIENT)
Dept: INTERNAL MEDICINE | Facility: CLINIC | Age: 43
End: 2023-06-23
Payer: COMMERCIAL

## 2023-06-23 ENCOUNTER — HOSPITAL ENCOUNTER (OUTPATIENT)
Dept: RADIOLOGY | Facility: HOSPITAL | Age: 43
Discharge: HOME | End: 2023-06-23
Attending: INTERNAL MEDICINE
Payer: COMMERCIAL

## 2023-06-23 DIAGNOSIS — Z00.00 PREVENTATIVE HEALTH CARE: Primary | ICD-10-CM

## 2023-06-23 DIAGNOSIS — Z00.00 ROUTINE MEDICAL EXAM: ICD-10-CM

## 2023-06-23 DIAGNOSIS — Z00.00 ANNUAL PHYSICAL EXAM: Primary | ICD-10-CM

## 2023-06-23 LAB
ALBUMIN SERPL BCP-MCNC: 3.8 G/DL (ref 3.5–5.2)
ALP SERPL-CCNC: 47 U/L (ref 55–135)
ALT SERPL W/O P-5'-P-CCNC: 15 U/L (ref 10–44)
ANION GAP SERPL CALC-SCNC: 6 MMOL/L (ref 8–16)
AST SERPL-CCNC: 15 U/L (ref 10–40)
BILIRUB SERPL-MCNC: 0.4 MG/DL (ref 0.1–1)
BUN SERPL-MCNC: 15 MG/DL (ref 6–20)
CALCIUM SERPL-MCNC: 8.7 MG/DL (ref 8.7–10.5)
CHLORIDE SERPL-SCNC: 109 MMOL/L (ref 95–110)
CHOLEST SERPL-MCNC: 199 MG/DL (ref 120–199)
CHOLEST/HDLC SERPL: 3.8 {RATIO} (ref 2–5)
CO2 SERPL-SCNC: 24 MMOL/L (ref 23–29)
CREAT SERPL-MCNC: 0.8 MG/DL (ref 0.5–1.4)
ERYTHROCYTE [DISTWIDTH] IN BLOOD BY AUTOMATED COUNT: 12.4 % (ref 11.5–14.5)
EST. GFR  (NO RACE VARIABLE): >60 ML/MIN/1.73 M^2
ESTIMATED AVG GLUCOSE: 108 MG/DL (ref 68–131)
GLUCOSE SERPL-MCNC: 101 MG/DL (ref 70–110)
HBA1C MFR BLD: 5.4 % (ref 4–5.6)
HCT VFR BLD AUTO: 37.4 % (ref 37–48.5)
HDLC SERPL-MCNC: 53 MG/DL (ref 40–75)
HDLC SERPL: 26.6 % (ref 20–50)
HGB BLD-MCNC: 12.8 G/DL (ref 12–16)
LDLC SERPL CALC-MCNC: 137.8 MG/DL (ref 63–159)
MCH RBC QN AUTO: 31.6 PG (ref 27–31)
MCHC RBC AUTO-ENTMCNC: 34.2 G/DL (ref 32–36)
MCV RBC AUTO: 92 FL (ref 82–98)
NONHDLC SERPL-MCNC: 146 MG/DL
PLATELET # BLD AUTO: 221 K/UL (ref 150–450)
PMV BLD AUTO: 9.9 FL (ref 9.2–12.9)
POTASSIUM SERPL-SCNC: 4.2 MMOL/L (ref 3.5–5.1)
PROT SERPL-MCNC: 7 G/DL (ref 6–8.4)
RBC # BLD AUTO: 4.05 M/UL (ref 4–5.4)
SODIUM SERPL-SCNC: 139 MMOL/L (ref 136–145)
TRIGL SERPL-MCNC: 41 MG/DL (ref 30–150)
TSH SERPL DL<=0.005 MIU/L-ACNC: 3.6 UIU/ML (ref 0.4–4)
WBC # BLD AUTO: 5.87 K/UL (ref 3.9–12.7)

## 2023-06-23 PROCEDURE — 85027 COMPLETE CBC AUTOMATED: CPT | Performed by: INTERNAL MEDICINE

## 2023-06-23 PROCEDURE — 3008F BODY MASS INDEX DOCD: CPT | Mod: CPTII,S$GLB,, | Performed by: INTERNAL MEDICINE

## 2023-06-23 PROCEDURE — 99999 PR PBB SHADOW E&M-EST. PATIENT-LVL I: ICD-10-PCS | Mod: PBBFAC,,,

## 2023-06-23 PROCEDURE — 3078F PR MOST RECENT DIASTOLIC BLOOD PRESSURE < 80 MM HG: ICD-10-PCS | Mod: CPTII,S$GLB,, | Performed by: INTERNAL MEDICINE

## 2023-06-23 PROCEDURE — 77067 SCR MAMMO BI INCL CAD: CPT | Mod: TC

## 2023-06-23 PROCEDURE — 99999 PR PBB SHADOW E&M-EST. PATIENT-LVL I: CPT | Mod: PBBFAC,,,

## 2023-06-23 PROCEDURE — 1159F PR MEDICATION LIST DOCUMENTED IN MEDICAL RECORD: ICD-10-PCS | Mod: CPTII,S$GLB,, | Performed by: INTERNAL MEDICINE

## 2023-06-23 PROCEDURE — 84443 ASSAY THYROID STIM HORMONE: CPT | Performed by: INTERNAL MEDICINE

## 2023-06-23 PROCEDURE — 1159F MED LIST DOCD IN RCRD: CPT | Mod: CPTII,S$GLB,, | Performed by: INTERNAL MEDICINE

## 2023-06-23 PROCEDURE — 99999 PR PBB SHADOW E&M-EST. PATIENT-LVL IV: ICD-10-PCS | Mod: PBBFAC,,, | Performed by: INTERNAL MEDICINE

## 2023-06-23 PROCEDURE — 80061 LIPID PANEL: CPT | Performed by: INTERNAL MEDICINE

## 2023-06-23 PROCEDURE — 90632 HEPA VACCINE ADULT IM: CPT | Mod: S$GLB,,, | Performed by: INTERNAL MEDICINE

## 2023-06-23 PROCEDURE — 77063 MAMMO DIGITAL SCREENING BILAT WITH TOMO: ICD-10-PCS | Mod: 26,,, | Performed by: RADIOLOGY

## 2023-06-23 PROCEDURE — 3078F DIAST BP <80 MM HG: CPT | Mod: CPTII,S$GLB,, | Performed by: INTERNAL MEDICINE

## 2023-06-23 PROCEDURE — 3044F PR MOST RECENT HEMOGLOBIN A1C LEVEL <7.0%: ICD-10-PCS | Mod: CPTII,S$GLB,, | Performed by: INTERNAL MEDICINE

## 2023-06-23 PROCEDURE — 3008F PR BODY MASS INDEX (BMI) DOCUMENTED: ICD-10-PCS | Mod: CPTII,S$GLB,, | Performed by: INTERNAL MEDICINE

## 2023-06-23 PROCEDURE — 90471 IMMUNIZATION ADMIN: CPT | Mod: S$GLB,,, | Performed by: INTERNAL MEDICINE

## 2023-06-23 PROCEDURE — 83036 HEMOGLOBIN GLYCOSYLATED A1C: CPT | Performed by: INTERNAL MEDICINE

## 2023-06-23 PROCEDURE — 99396 PR PREVENTIVE VISIT,EST,40-64: ICD-10-PCS | Mod: S$GLB,,, | Performed by: INTERNAL MEDICINE

## 2023-06-23 PROCEDURE — 90632 HEPATITIS A VACCINE ADULT IM: ICD-10-PCS | Mod: S$GLB,,, | Performed by: INTERNAL MEDICINE

## 2023-06-23 PROCEDURE — 77067 MAMMO DIGITAL SCREENING BILAT WITH TOMO: ICD-10-PCS | Mod: 26,,, | Performed by: RADIOLOGY

## 2023-06-23 PROCEDURE — 99999 PR PBB SHADOW E&M-EST. PATIENT-LVL IV: CPT | Mod: PBBFAC,,, | Performed by: INTERNAL MEDICINE

## 2023-06-23 PROCEDURE — 80053 COMPREHEN METABOLIC PANEL: CPT | Performed by: INTERNAL MEDICINE

## 2023-06-23 PROCEDURE — 77063 BREAST TOMOSYNTHESIS BI: CPT | Mod: 26,,, | Performed by: RADIOLOGY

## 2023-06-23 PROCEDURE — 3044F HG A1C LEVEL LT 7.0%: CPT | Mod: CPTII,S$GLB,, | Performed by: INTERNAL MEDICINE

## 2023-06-23 PROCEDURE — 3074F PR MOST RECENT SYSTOLIC BLOOD PRESSURE < 130 MM HG: ICD-10-PCS | Mod: CPTII,S$GLB,, | Performed by: INTERNAL MEDICINE

## 2023-06-23 PROCEDURE — 90471 HEPATITIS A VACCINE ADULT IM: ICD-10-PCS | Mod: S$GLB,,, | Performed by: INTERNAL MEDICINE

## 2023-06-23 PROCEDURE — 99396 PREV VISIT EST AGE 40-64: CPT | Mod: S$GLB,,, | Performed by: INTERNAL MEDICINE

## 2023-06-23 PROCEDURE — 36415 COLL VENOUS BLD VENIPUNCTURE: CPT | Performed by: INTERNAL MEDICINE

## 2023-06-23 PROCEDURE — 77067 SCR MAMMO BI INCL CAD: CPT | Mod: 26,,, | Performed by: RADIOLOGY

## 2023-06-23 PROCEDURE — 3074F SYST BP LT 130 MM HG: CPT | Mod: CPTII,S$GLB,, | Performed by: INTERNAL MEDICINE

## 2023-06-23 NOTE — PROGRESS NOTES
Patient received Hep A #1 vaccines in the left deltoid. Pt tolerated well. Pt asked to wait in the clinic 15 minutes after injection in the event of an allergic reaction. Pt verbalized understanding. Pt left in NAD.

## 2023-06-23 NOTE — LETTER
June 29, 2023    Evelia Missy Mishra  [address] [address]             [facility]  [address]  [facility] [address]  Phone: [phone]  Fax: [phone] Dear Ms. Mishra:    It was a pleasure to meet you and perform your Executive Physical.    This letter will serve as a brief summary of the history, physical findings, and laboratory/studies performed and recommendations at this time.      Reason for visit:  Executive health preventive physical examination     Past medical history:  Subclinical hypothyroidism     Medications: None     No known drug allergies     Family history:  Grandmother had breast cancer, coronary artery disease, diabetes     REVIEW OF SYSTEMS:  GENERAL:  No fever, chills, fatigability, or weight loss.    SKIN:  No rashes, itching, or changes in color or texture of skin.    HEAD:  No headaches or recent head trauma.    Vital signs blood pressure 108/66, pulse 77, weight 66.4 kg     HEENT:  Pupils are equal, round, and reactive to light.  Tympanic            membranes are clear bilaterally.  Oropharynx is within normal limits.        NECK:  Supple.                                                               LYMPH:  No neck or axillary lymphadenopathy.                                 LUNGS:  Clear to auscultation bilaterally.                                   CARDIOVASCULAR:  Regular rate and rhythm without murmurs, rubs, or gallops.  ABDOMEN:  Positive bowel sounds, nontender, and nondistended.  No            hepatosplenomegaly.                                                          EXTREMITIES:  No cyanosis, clubbing, or edema.                               NEURO:  Cranial nerves 2-12 are grossly intact.  No sensory deficits.        Motor 5/5 all extremities.  Deep tendon reflexes +2 throughout.    Laboratory data/studies attached.      You reported seeing your Gynecologist within the past year.    I would recommend a high  fiber, lean protein diet that is high in complex (slow) carbohydrates such as non-starchy vegetables and whole grains.  Regular vigorous exercise 3-4 x weekly for 30-45 minutes would also be ideal.      At this time, you appear to be in good medical condition.  I look forward to seeing you again next year.      If you have any questions or concerns, please don't hesitate to call.    Sincerely,      Millie Lawrence MD

## 2023-06-29 VITALS
BODY MASS INDEX: 26.94 KG/M2 | HEART RATE: 77 BPM | OXYGEN SATURATION: 99 % | HEIGHT: 62 IN | TEMPERATURE: 99 F | WEIGHT: 146.38 LBS | SYSTOLIC BLOOD PRESSURE: 108 MMHG | DIASTOLIC BLOOD PRESSURE: 66 MMHG

## 2023-06-29 NOTE — PROGRESS NOTES
[facility]  [address]  [facility] [address]  Phone: [phone]  Fax: [phone] Dear Ms. Mishra:    It was a pleasure to meet you and perform your Executive Physical.    This letter will serve as a brief summary of the history, physical findings, and laboratory/studies performed and recommendations at this time.      Reason for visit:  Executive health preventive physical examination     Past medical history:  Subclinical hypothyroidism     Medications: None     No known drug allergies     Family history:  Grandmother had breast cancer, coronary artery disease, diabetes     REVIEW OF SYSTEMS:  GENERAL:  No fever, chills, fatigability, or weight loss.    SKIN:  No rashes, itching, or changes in color or texture of skin.    HEAD:  No headaches or recent head trauma.    Vital signs blood pressure 108/66, pulse 77, weight 66.4 kg     HEENT:  Pupils are equal, round, and reactive to light.  Tympanic            membranes are clear bilaterally.  Oropharynx is within normal limits.        NECK:  Supple.                                                               LYMPH:  No neck or axillary lymphadenopathy.                                 LUNGS:  Clear to auscultation bilaterally.                                   CARDIOVASCULAR:  Regular rate and rhythm without murmurs, rubs, or gallops.  ABDOMEN:  Positive bowel sounds, nontender, and nondistended.  No            hepatosplenomegaly.                                                          EXTREMITIES:  No cyanosis, clubbing, or edema.                               NEURO:  Cranial nerves 2-12 are grossly intact.  No sensory deficits.        Motor 5/5 all extremities.  Deep tendon reflexes +2 throughout.    Laboratory data/studies attached.      You reported seeing your Gynecologist within the past year.    I would recommend a high fiber, lean protein diet that is high in complex (slow) carbohydrates such as non-starchy vegetables  and whole grains.  Regular vigorous exercise 3-4 x weekly for 30-45 minutes would also be ideal.      At this time, you appear to be in good medical condition.  I look forward to seeing you again next year.      If you have any questions or concerns, please don't hesitate to call.    Sincerely,      Millie Lawrence MD

## 2024-08-05 NOTE — PROGRESS NOTES
GOOD FM, MILD CRAMPY UC AND NO PV LOSS; OCCAS SHARP RIGHT ROUND LIG PAIN. .  DISCUSSED WARNING SIGNS.  LABS AND TDAP TODAY, HAS RECEIVED FLU VACCINE.  APPT 11/16 F/U [facility].  VISITS 2 AND 4 WEEKS   Patient with lower extremity edema (mild), ok for compression stockings.

## (undated) DEVICE — SEE MEDLINE ITEM 146355

## (undated) DEVICE — IRRIGATOR ENDOSCOPY DISP.

## (undated) DEVICE — SYR 10CC LUER LOCK

## (undated) DEVICE — DISSECTOR EPIX LAPA 5MMX35CM

## (undated) DEVICE — APPLICATOR CHLORAPREP ORN 26ML

## (undated) DEVICE — SEE MEDLINE ITEM 157131

## (undated) DEVICE — COVER OVERHEAD SURG LT BLUE

## (undated) DEVICE — SEE MEDLINE ITEM 146292

## (undated) DEVICE — SEE MEDLINE ITEM 157116

## (undated) DEVICE — SEE MEDLINE ITEM 152622

## (undated) DEVICE — BAG TISS RETRV MONARCH 10MM

## (undated) DEVICE — SYR B-D DISP CONTROL 10CC100/C

## (undated) DEVICE — TROCAR ENDOPATH XCEL 5MM 7.5CM

## (undated) DEVICE — TUBING INSUFFLATION 10

## (undated) DEVICE — TROCAR ENDOPATH XCEL 5X75MM

## (undated) DEVICE — KIT ANTIFOG

## (undated) DEVICE — SCISSOR 5MMX35CM DIRECT DRIVE

## (undated) DEVICE — SEE MEDLINE ITEM 157117

## (undated) DEVICE — DRESSING TEGADERM 2 3/X2.75IN

## (undated) DEVICE — GLOVE SURGICAL LATEX SZ 6.5

## (undated) DEVICE — TRAY FOLEY 16FR INFECTION CONT

## (undated) DEVICE — ADHESIVE DERMABOND ADVANCED

## (undated) DEVICE — SEALER LIGASURE LAP 37CM 5MM

## (undated) DEVICE — PACK LAPAROSCOPY TIBURON

## (undated) DEVICE — DRESSING TEGADERM 2 3/8 X 2.75

## (undated) DEVICE — SUT MONOCRYL 3-0 PS-1

## (undated) DEVICE — SPONGE DERMA 8PLY 2X2

## (undated) DEVICE — BLADE SURG CARBON STEEL SZ11

## (undated) DEVICE — MANIFOLD 4 PORT

## (undated) DEVICE — UNDERGLOVE BIOGEL PI SZ 6.5 LF

## (undated) DEVICE — CLOSURE SKIN STERI STRIP 1/2X4

## (undated) DEVICE — SEE MEDLINE ITEM 154981

## (undated) DEVICE — SLEEVE SCD EXPRESS CALF LARGE

## (undated) DEVICE — TROCAR ENDOPATH XCEL 11MM 10CM

## (undated) DEVICE — ELECTRODE REM PLYHSV RETURN 9

## (undated) DEVICE — NDL HYPO REG 25G X 1 1/2

## (undated) DEVICE — SEE MEDLINE ITEM 156955